# Patient Record
Sex: MALE | Race: WHITE | Employment: OTHER | URBAN - NONMETROPOLITAN AREA
[De-identification: names, ages, dates, MRNs, and addresses within clinical notes are randomized per-mention and may not be internally consistent; named-entity substitution may affect disease eponyms.]

---

## 2017-03-03 ENCOUNTER — OFFICE VISIT (OUTPATIENT)
Dept: FAMILY MEDICINE CLINIC | Age: 62
End: 2017-03-03
Payer: COMMERCIAL

## 2017-03-03 ENCOUNTER — HOSPITAL ENCOUNTER (OUTPATIENT)
Dept: GENERAL RADIOLOGY | Age: 62
Discharge: HOME | End: 2017-03-03
Payer: COMMERCIAL

## 2017-03-03 VITALS
DIASTOLIC BLOOD PRESSURE: 86 MMHG | RESPIRATION RATE: 16 BRPM | SYSTOLIC BLOOD PRESSURE: 138 MMHG | HEART RATE: 90 BPM | WEIGHT: 266 LBS | OXYGEN SATURATION: 95 % | TEMPERATURE: 98 F

## 2017-03-03 DIAGNOSIS — E11.40 TYPE 2 DIABETES MELLITUS WITH DIABETIC NEUROPATHY, WITHOUT LONG-TERM CURRENT USE OF INSULIN (HCC): Primary | ICD-10-CM

## 2017-03-03 DIAGNOSIS — E11.40 TYPE 2 DIABETES MELLITUS WITH DIABETIC NEUROPATHY, WITHOUT LONG-TERM CURRENT USE OF INSULIN (HCC): ICD-10-CM

## 2017-03-03 DIAGNOSIS — R20.0 NUMBNESS IN FEET: ICD-10-CM

## 2017-03-03 DIAGNOSIS — E11.9 TYPE 2 DIABETES MELLITUS WITHOUT COMPLICATION, WITHOUT LONG-TERM CURRENT USE OF INSULIN (HCC): ICD-10-CM

## 2017-03-03 LAB
ALBUMIN SERPL-MCNC: 4.3 G/DL (ref 3.5–5.2)
ALP BLD-CCNC: 55 U/L (ref 40–130)
ALT SERPL-CCNC: 17 U/L (ref 5–41)
ANION GAP SERPL CALCULATED.3IONS-SCNC: 14 MMOL/L (ref 7–19)
AST SERPL-CCNC: 14 U/L (ref 5–40)
BILIRUB SERPL-MCNC: 0.5 MG/DL (ref 0.2–1.2)
BUN BLDV-MCNC: 22 MG/DL (ref 8–23)
CALCIUM SERPL-MCNC: 9.4 MG/DL (ref 8.8–10.2)
CHLORIDE BLD-SCNC: 99 MMOL/L (ref 98–111)
CHOLESTEROL, TOTAL: 190 MG/DL (ref 160–199)
CO2: 27 MMOL/L (ref 22–29)
CREAT SERPL-MCNC: 0.8 MG/DL (ref 0.5–1.2)
GFR NON-AFRICAN AMERICAN: >60
GLOBULIN: 3.3 G/DL
GLUCOSE BLD-MCNC: 99 MG/DL (ref 74–109)
HBA1C MFR BLD: 6.6 %
HDLC SERPL-MCNC: 37 MG/DL (ref 55–121)
LDL CHOLESTEROL CALCULATED: 121 MG/DL
POTASSIUM SERPL-SCNC: 3.9 MMOL/L (ref 3.5–5)
SODIUM BLD-SCNC: 140 MMOL/L (ref 136–145)
TOTAL PROTEIN: 7.6 G/DL (ref 6.6–8.7)
TRIGL SERPL-MCNC: 158 MG/DL (ref 150–199)

## 2017-03-03 PROCEDURE — 99213 OFFICE O/P EST LOW 20 MIN: CPT | Performed by: NURSE PRACTITIONER

## 2017-03-03 PROCEDURE — 72100 X-RAY EXAM L-S SPINE 2/3 VWS: CPT

## 2017-03-03 RX ORDER — LISINOPRIL 5 MG/1
5 TABLET ORAL DAILY
Qty: 30 TABLET | Refills: 5 | Status: SHIPPED | OUTPATIENT
Start: 2017-03-03 | End: 2017-09-28 | Stop reason: SDUPTHER

## 2017-03-03 RX ORDER — GLIPIZIDE 5 MG/1
5 TABLET ORAL
Qty: 60 TABLET | Refills: 5 | Status: SHIPPED | OUTPATIENT
Start: 2017-03-03 | End: 2017-09-28 | Stop reason: SDUPTHER

## 2017-03-03 RX ORDER — GABAPENTIN 100 MG/1
100 CAPSULE ORAL 3 TIMES DAILY
Qty: 90 CAPSULE | Refills: 2 | Status: SHIPPED | OUTPATIENT
Start: 2017-03-03 | End: 2017-03-16 | Stop reason: CLARIF

## 2017-03-06 DIAGNOSIS — R20.2 PARESTHESIA OF BOTH FEET: Primary | ICD-10-CM

## 2017-03-07 RX ORDER — ATORVASTATIN CALCIUM 10 MG/1
10 TABLET, FILM COATED ORAL NIGHTLY
Qty: 30 TABLET | Refills: 5 | Status: SHIPPED | OUTPATIENT
Start: 2017-03-07 | End: 2017-09-28

## 2017-03-07 ASSESSMENT — ENCOUNTER SYMPTOMS: BACK PAIN: 0

## 2017-03-16 DIAGNOSIS — R20.0 NUMBNESS IN FEET: ICD-10-CM

## 2017-03-16 RX ORDER — GABAPENTIN 300 MG/1
300 CAPSULE ORAL 3 TIMES DAILY
Qty: 90 CAPSULE | Refills: 2 | Status: SHIPPED | OUTPATIENT
Start: 2017-03-16 | End: 2017-06-22 | Stop reason: SDUPTHER

## 2017-06-23 RX ORDER — GABAPENTIN 300 MG/1
CAPSULE ORAL
Qty: 90 CAPSULE | Refills: 0 | Status: SHIPPED | OUTPATIENT
Start: 2017-06-23 | End: 2017-08-15 | Stop reason: SDUPTHER

## 2017-07-25 RX ORDER — GABAPENTIN 300 MG/1
CAPSULE ORAL
Qty: 90 CAPSULE | Refills: 0 | OUTPATIENT
Start: 2017-07-25

## 2017-08-15 RX ORDER — GABAPENTIN 300 MG/1
CAPSULE ORAL
Qty: 90 CAPSULE | Refills: 1 | Status: SHIPPED | OUTPATIENT
Start: 2017-08-15 | End: 2017-09-28 | Stop reason: SDUPTHER

## 2017-09-28 ENCOUNTER — OFFICE VISIT (OUTPATIENT)
Dept: FAMILY MEDICINE CLINIC | Age: 62
End: 2017-09-28
Payer: COMMERCIAL

## 2017-09-28 VITALS
DIASTOLIC BLOOD PRESSURE: 94 MMHG | HEART RATE: 86 BPM | OXYGEN SATURATION: 93 % | SYSTOLIC BLOOD PRESSURE: 132 MMHG | WEIGHT: 284.8 LBS | TEMPERATURE: 98.4 F | RESPIRATION RATE: 16 BRPM

## 2017-09-28 DIAGNOSIS — E11.40 TYPE 2 DIABETES MELLITUS WITH DIABETIC NEUROPATHY, WITHOUT LONG-TERM CURRENT USE OF INSULIN (HCC): Primary | ICD-10-CM

## 2017-09-28 DIAGNOSIS — E11.9 TYPE 2 DIABETES MELLITUS WITHOUT COMPLICATION, WITHOUT LONG-TERM CURRENT USE OF INSULIN (HCC): ICD-10-CM

## 2017-09-28 PROCEDURE — 99213 OFFICE O/P EST LOW 20 MIN: CPT | Performed by: NURSE PRACTITIONER

## 2017-09-28 RX ORDER — LISINOPRIL 5 MG/1
5 TABLET ORAL DAILY
Qty: 30 TABLET | Refills: 5 | Status: SHIPPED | OUTPATIENT
Start: 2017-09-28 | End: 2018-03-22 | Stop reason: SDUPTHER

## 2017-09-28 RX ORDER — GABAPENTIN 300 MG/1
CAPSULE ORAL
Qty: 90 CAPSULE | Refills: 1 | Status: SHIPPED | OUTPATIENT
Start: 2017-09-28 | End: 2017-11-14 | Stop reason: SDUPTHER

## 2017-09-28 RX ORDER — GLIPIZIDE 5 MG/1
5 TABLET ORAL
Qty: 60 TABLET | Refills: 5 | Status: SHIPPED | OUTPATIENT
Start: 2017-09-28 | End: 2018-04-20 | Stop reason: SDUPTHER

## 2017-09-28 ASSESSMENT — ENCOUNTER SYMPTOMS
VISUAL CHANGE: 0
BLURRED VISION: 0

## 2017-11-14 ENCOUNTER — TELEPHONE (OUTPATIENT)
Dept: FAMILY MEDICINE CLINIC | Age: 62
End: 2017-11-14

## 2017-11-14 DIAGNOSIS — E11.40 TYPE 2 DIABETES MELLITUS WITH DIABETIC NEUROPATHY, WITHOUT LONG-TERM CURRENT USE OF INSULIN (HCC): ICD-10-CM

## 2017-11-14 RX ORDER — GABAPENTIN 300 MG/1
CAPSULE ORAL
Qty: 90 CAPSULE | Refills: 5 | OUTPATIENT
Start: 2017-11-14 | End: 2018-04-20 | Stop reason: SDUPTHER

## 2017-11-14 RX ORDER — GABAPENTIN 300 MG/1
CAPSULE ORAL
Qty: 90 CAPSULE | Refills: 1 | Status: SHIPPED | OUTPATIENT
Start: 2017-11-14 | End: 2017-11-14 | Stop reason: SDUPTHER

## 2018-03-03 DIAGNOSIS — E11.9 TYPE 2 DIABETES MELLITUS WITHOUT COMPLICATION, WITHOUT LONG-TERM CURRENT USE OF INSULIN (HCC): ICD-10-CM

## 2018-03-05 RX ORDER — LISINOPRIL 5 MG/1
TABLET ORAL
Qty: 30 TABLET | Refills: 5 | OUTPATIENT
Start: 2018-03-05

## 2018-03-22 DIAGNOSIS — E11.9 TYPE 2 DIABETES MELLITUS WITHOUT COMPLICATION, WITHOUT LONG-TERM CURRENT USE OF INSULIN (HCC): ICD-10-CM

## 2018-03-22 RX ORDER — LISINOPRIL 5 MG/1
5 TABLET ORAL DAILY
Qty: 30 TABLET | Refills: 0 | Status: SHIPPED | OUTPATIENT
Start: 2018-03-22 | End: 2018-04-20 | Stop reason: SDUPTHER

## 2018-04-20 ENCOUNTER — OFFICE VISIT (OUTPATIENT)
Dept: FAMILY MEDICINE CLINIC | Age: 63
End: 2018-04-20
Payer: COMMERCIAL

## 2018-04-20 VITALS
TEMPERATURE: 97.5 F | SYSTOLIC BLOOD PRESSURE: 134 MMHG | HEART RATE: 91 BPM | DIASTOLIC BLOOD PRESSURE: 84 MMHG | RESPIRATION RATE: 16 BRPM | OXYGEN SATURATION: 95 % | WEIGHT: 294 LBS

## 2018-04-20 DIAGNOSIS — E11.9 TYPE 2 DIABETES MELLITUS WITHOUT COMPLICATION, WITHOUT LONG-TERM CURRENT USE OF INSULIN (HCC): ICD-10-CM

## 2018-04-20 DIAGNOSIS — E11.40 TYPE 2 DIABETES MELLITUS WITH DIABETIC NEUROPATHY, WITHOUT LONG-TERM CURRENT USE OF INSULIN (HCC): ICD-10-CM

## 2018-04-20 PROCEDURE — 99214 OFFICE O/P EST MOD 30 MIN: CPT | Performed by: NURSE PRACTITIONER

## 2018-04-20 RX ORDER — IBUPROFEN 800 MG/1
800 TABLET ORAL EVERY 8 HOURS PRN
Qty: 120 TABLET | Refills: 3 | Status: SHIPPED | OUTPATIENT
Start: 2018-04-20 | End: 2019-06-14 | Stop reason: SDUPTHER

## 2018-04-20 RX ORDER — GABAPENTIN 300 MG/1
CAPSULE ORAL
Qty: 90 CAPSULE | Refills: 2 | Status: SHIPPED | OUTPATIENT
Start: 2018-04-20 | End: 2018-09-24 | Stop reason: SDUPTHER

## 2018-04-20 RX ORDER — LISINOPRIL 5 MG/1
5 TABLET ORAL DAILY
Qty: 30 TABLET | Refills: 0 | Status: SHIPPED | OUTPATIENT
Start: 2018-04-20 | End: 2018-04-20 | Stop reason: ALTCHOICE

## 2018-04-20 RX ORDER — LISINOPRIL AND HYDROCHLOROTHIAZIDE 12.5; 1 MG/1; MG/1
1 TABLET ORAL DAILY
Qty: 30 TABLET | Refills: 5 | Status: SHIPPED | OUTPATIENT
Start: 2018-04-20 | End: 2018-05-22 | Stop reason: DRUGHIGH

## 2018-04-20 RX ORDER — GLIPIZIDE 5 MG/1
5 TABLET ORAL
Qty: 60 TABLET | Refills: 5 | Status: SHIPPED | OUTPATIENT
Start: 2018-04-20 | End: 2018-08-17 | Stop reason: DRUGHIGH

## 2018-04-20 ASSESSMENT — PATIENT HEALTH QUESTIONNAIRE - PHQ9
SUM OF ALL RESPONSES TO PHQ9 QUESTIONS 1 & 2: 0
2. FEELING DOWN, DEPRESSED OR HOPELESS: 0
SUM OF ALL RESPONSES TO PHQ QUESTIONS 1-9: 0
1. LITTLE INTEREST OR PLEASURE IN DOING THINGS: 0

## 2018-05-12 LAB
AVERAGE GLUCOSE: NORMAL
HBA1C MFR BLD: 7.7 %

## 2018-05-15 LAB
AVERAGE GLUCOSE: NORMAL
HBA1C MFR BLD: 7.7 %

## 2018-05-22 ENCOUNTER — OFFICE VISIT (OUTPATIENT)
Dept: FAMILY MEDICINE CLINIC | Age: 63
End: 2018-05-22
Payer: COMMERCIAL

## 2018-05-22 ENCOUNTER — TELEPHONE (OUTPATIENT)
Dept: FAMILY MEDICINE CLINIC | Age: 63
End: 2018-05-22

## 2018-05-22 VITALS
OXYGEN SATURATION: 98 % | DIASTOLIC BLOOD PRESSURE: 64 MMHG | TEMPERATURE: 98.4 F | WEIGHT: 278 LBS | SYSTOLIC BLOOD PRESSURE: 114 MMHG | RESPIRATION RATE: 16 BRPM | HEART RATE: 102 BPM

## 2018-05-22 DIAGNOSIS — N28.89 MASS OF RIGHT KIDNEY: Primary | ICD-10-CM

## 2018-05-22 DIAGNOSIS — N28.89 RENAL MASS, RIGHT: ICD-10-CM

## 2018-05-22 DIAGNOSIS — J18.9 PNEUMONIA OF BOTH LOWER LOBES DUE TO INFECTIOUS ORGANISM: Primary | ICD-10-CM

## 2018-05-22 DIAGNOSIS — R09.02 HYPOXIA: ICD-10-CM

## 2018-05-22 PROCEDURE — 99496 TRANSJ CARE MGMT HIGH F2F 7D: CPT | Performed by: NURSE PRACTITIONER

## 2018-05-22 RX ORDER — DOXYCYCLINE HYCLATE 100 MG/1
100 CAPSULE ORAL 2 TIMES DAILY
COMMUNITY
End: 2018-08-17

## 2018-05-22 RX ORDER — LISINOPRIL AND HYDROCHLOROTHIAZIDE 25; 20 MG/1; MG/1
1 TABLET ORAL DAILY
COMMUNITY
End: 2018-12-07 | Stop reason: SDUPTHER

## 2018-05-22 RX ORDER — IPRATROPIUM BROMIDE AND ALBUTEROL SULFATE 2.5; .5 MG/3ML; MG/3ML
1 SOLUTION RESPIRATORY (INHALATION) EVERY 4 HOURS PRN
COMMUNITY
End: 2022-01-18 | Stop reason: SDUPTHER

## 2018-05-22 ASSESSMENT — PATIENT HEALTH QUESTIONNAIRE - PHQ9
SUM OF ALL RESPONSES TO PHQ QUESTIONS 1-9: 0
SUM OF ALL RESPONSES TO PHQ9 QUESTIONS 1 & 2: 0
1. LITTLE INTEREST OR PLEASURE IN DOING THINGS: 0
2. FEELING DOWN, DEPRESSED OR HOPELESS: 0

## 2018-05-22 ASSESSMENT — ENCOUNTER SYMPTOMS
COUGH: 0
ABDOMINAL DISTENTION: 0
SHORTNESS OF BREATH: 0

## 2018-05-23 ENCOUNTER — TELEPHONE (OUTPATIENT)
Dept: FAMILY MEDICINE CLINIC | Age: 63
End: 2018-05-23

## 2018-05-23 DIAGNOSIS — E11.9 TYPE 2 DIABETES MELLITUS WITHOUT COMPLICATION, WITHOUT LONG-TERM CURRENT USE OF INSULIN (HCC): ICD-10-CM

## 2018-05-23 DIAGNOSIS — R09.02 HYPOXIA: ICD-10-CM

## 2018-05-23 LAB
ALBUMIN SERPL-MCNC: 3.6 G/DL (ref 3.5–5.2)
ALP BLD-CCNC: 119 U/L (ref 40–130)
ALT SERPL-CCNC: 56 U/L (ref 5–41)
ANION GAP SERPL CALCULATED.3IONS-SCNC: 15 MMOL/L (ref 7–19)
AST SERPL-CCNC: 24 U/L (ref 5–40)
BILIRUB SERPL-MCNC: 0.5 MG/DL (ref 0.2–1.2)
BILIRUBIN URINE: NEGATIVE
BLOOD, URINE: NEGATIVE
BUN BLDV-MCNC: 19 MG/DL (ref 8–23)
CALCIUM SERPL-MCNC: 9.2 MG/DL (ref 8.8–10.2)
CHLORIDE BLD-SCNC: 95 MMOL/L (ref 98–111)
CLARITY: CLEAR
CO2: 30 MMOL/L (ref 22–29)
COLOR: ABNORMAL
CREAT SERPL-MCNC: 0.8 MG/DL (ref 0.5–1.2)
CREATININE URINE: 211.9 MG/DL (ref 4.2–622)
GFR NON-AFRICAN AMERICAN: >60
GLUCOSE BLD-MCNC: 129 MG/DL (ref 74–109)
GLUCOSE URINE: NEGATIVE MG/DL
HCT VFR BLD CALC: 46.6 % (ref 42–52)
HEMOGLOBIN: 14.4 G/DL (ref 14–18)
KETONES, URINE: NEGATIVE MG/DL
LEUKOCYTE ESTERASE, URINE: NEGATIVE
MCH RBC QN AUTO: 27.1 PG (ref 27–31)
MCHC RBC AUTO-ENTMCNC: 30.9 G/DL (ref 33–37)
MCV RBC AUTO: 87.8 FL (ref 80–94)
MICROALBUMIN UR-MCNC: 3.6 MG/DL (ref 0–19)
MICROALBUMIN/CREAT UR-RTO: 17 MG/G
NITRITE, URINE: NEGATIVE
PDW BLD-RTO: 13.9 % (ref 11.5–14.5)
PH UA: 7.5
PLATELET # BLD: 349 K/UL (ref 130–400)
PMV BLD AUTO: 9.5 FL (ref 9.4–12.4)
POTASSIUM SERPL-SCNC: 4 MMOL/L (ref 3.5–5)
PROTEIN UA: ABNORMAL MG/DL
RBC # BLD: 5.31 M/UL (ref 4.7–6.1)
SODIUM BLD-SCNC: 140 MMOL/L (ref 136–145)
SPECIFIC GRAVITY UA: 1.01
TOTAL PROTEIN: 7.7 G/DL (ref 6.6–8.7)
UROBILINOGEN, URINE: 0.2 E.U./DL
WBC # BLD: 9.8 K/UL (ref 4.8–10.8)

## 2018-06-06 ENCOUNTER — TELEPHONE (OUTPATIENT)
Dept: FAMILY MEDICINE CLINIC | Age: 63
End: 2018-06-06

## 2018-08-17 ENCOUNTER — TELEPHONE (OUTPATIENT)
Dept: FAMILY MEDICINE CLINIC | Age: 63
End: 2018-08-17

## 2018-08-17 ENCOUNTER — OFFICE VISIT (OUTPATIENT)
Dept: FAMILY MEDICINE CLINIC | Age: 63
End: 2018-08-17
Payer: COMMERCIAL

## 2018-08-17 VITALS
OXYGEN SATURATION: 96 % | DIASTOLIC BLOOD PRESSURE: 80 MMHG | TEMPERATURE: 98.1 F | HEART RATE: 106 BPM | RESPIRATION RATE: 16 BRPM | SYSTOLIC BLOOD PRESSURE: 152 MMHG | WEIGHT: 293 LBS

## 2018-08-17 DIAGNOSIS — E11.65 TYPE 2 DIABETES MELLITUS WITH HYPERGLYCEMIA, WITHOUT LONG-TERM CURRENT USE OF INSULIN (HCC): ICD-10-CM

## 2018-08-17 DIAGNOSIS — E11.65 TYPE 2 DIABETES MELLITUS WITH HYPERGLYCEMIA, WITHOUT LONG-TERM CURRENT USE OF INSULIN (HCC): Primary | ICD-10-CM

## 2018-08-17 DIAGNOSIS — I10 BENIGN ESSENTIAL HTN: ICD-10-CM

## 2018-08-17 LAB
ALBUMIN SERPL-MCNC: 4.1 G/DL (ref 3.5–5.2)
ALP BLD-CCNC: 90 U/L (ref 40–130)
ALT SERPL-CCNC: 25 U/L (ref 5–41)
ANION GAP SERPL CALCULATED.3IONS-SCNC: 18 MMOL/L (ref 7–19)
AST SERPL-CCNC: 15 U/L (ref 5–40)
BASOPHILS ABSOLUTE: 0.1 K/UL (ref 0–0.2)
BASOPHILS RELATIVE PERCENT: 0.8 % (ref 0–1)
BILIRUB SERPL-MCNC: 0.3 MG/DL (ref 0.2–1.2)
BUN BLDV-MCNC: 29 MG/DL (ref 8–23)
CALCIUM SERPL-MCNC: 9.3 MG/DL (ref 8.8–10.2)
CHLORIDE BLD-SCNC: 95 MMOL/L (ref 98–111)
CO2: 26 MMOL/L (ref 22–29)
CREAT SERPL-MCNC: 1.3 MG/DL (ref 0.5–1.2)
EOSINOPHILS ABSOLUTE: 0.2 K/UL (ref 0–0.6)
EOSINOPHILS RELATIVE PERCENT: 2.3 % (ref 0–5)
GFR NON-AFRICAN AMERICAN: 55
GLUCOSE BLD-MCNC: 371 MG/DL (ref 74–109)
HBA1C MFR BLD: 9.4 % (ref 4–6)
HCT VFR BLD CALC: 44.3 % (ref 42–52)
HEMOGLOBIN: 13.9 G/DL (ref 14–18)
LYMPHOCYTES ABSOLUTE: 1.7 K/UL (ref 1.1–4.5)
LYMPHOCYTES RELATIVE PERCENT: 16.2 % (ref 20–40)
MCH RBC QN AUTO: 28 PG (ref 27–31)
MCHC RBC AUTO-ENTMCNC: 31.4 G/DL (ref 33–37)
MCV RBC AUTO: 89.3 FL (ref 80–94)
MONOCYTES ABSOLUTE: 0.6 K/UL (ref 0–0.9)
MONOCYTES RELATIVE PERCENT: 6.3 % (ref 0–10)
NEUTROPHILS ABSOLUTE: 7.5 K/UL (ref 1.5–7.5)
NEUTROPHILS RELATIVE PERCENT: 73.3 % (ref 50–65)
PDW BLD-RTO: 14.6 % (ref 11.5–14.5)
PLATELET # BLD: 248 K/UL (ref 130–400)
PMV BLD AUTO: 9.2 FL (ref 9.4–12.4)
POTASSIUM SERPL-SCNC: 4.4 MMOL/L (ref 3.5–5)
RBC # BLD: 4.96 M/UL (ref 4.7–6.1)
SODIUM BLD-SCNC: 139 MMOL/L (ref 136–145)
TOTAL PROTEIN: 7.4 G/DL (ref 6.6–8.7)
WBC # BLD: 10.2 K/UL (ref 4.8–10.8)

## 2018-08-17 PROCEDURE — 99214 OFFICE O/P EST MOD 30 MIN: CPT | Performed by: NURSE PRACTITIONER

## 2018-08-17 RX ORDER — AMLODIPINE BESYLATE 5 MG/1
5 TABLET ORAL NIGHTLY
Qty: 90 TABLET | Refills: 1 | Status: SHIPPED | OUTPATIENT
Start: 2018-08-17 | End: 2018-12-07 | Stop reason: SDUPTHER

## 2018-08-17 RX ORDER — AMLODIPINE BESYLATE 5 MG/1
5 TABLET ORAL NIGHTLY
Qty: 30 TABLET | Refills: 5 | Status: SHIPPED | OUTPATIENT
Start: 2018-08-17 | End: 2018-08-17 | Stop reason: SDUPTHER

## 2018-08-17 RX ORDER — CEFDINIR 300 MG/1
1 CAPSULE ORAL 2 TIMES DAILY
COMMUNITY
Start: 2018-08-11 | End: 2018-08-17 | Stop reason: ALTCHOICE

## 2018-08-17 RX ORDER — GLIPIZIDE 10 MG/1
10 TABLET ORAL 2 TIMES DAILY
Qty: 60 TABLET | Refills: 5 | Status: SHIPPED | OUTPATIENT
Start: 2018-08-17 | End: 2019-02-07 | Stop reason: SDUPTHER

## 2018-08-17 RX ORDER — LEVOFLOXACIN 750 MG/1
1 TABLET ORAL DAILY
COMMUNITY
Start: 2018-08-11 | End: 2018-12-07 | Stop reason: ALTCHOICE

## 2018-08-17 NOTE — TELEPHONE ENCOUNTER
I have sent in invokana 100 daily. Tell him to wait a week until he gets the 10 of glucotrol bid going and see what numbers do. If still elevated, add invokana, if not expensive. If too expensive, call insurance and get us preferred formulary list for diabetic meds.   Thanks, lee

## 2018-08-17 NOTE — PATIENT INSTRUCTIONS
You will get more details on the specific medicines your doctor prescribes. · Check your blood sugar as often as your doctor recommends. It is important to keep track of any symptoms you have, such as low blood sugar. Also tell your doctor if you have any changes in your activities, diet, or insulin use. · Talk to your doctor before you start taking aspirin every day. Aspirin can help certain people lower their risk of a heart attack or stroke. But taking aspirin isn't right for everyone, because it can cause serious bleeding. · Do not smoke. If you need help quitting, talk to your doctor about stop-smoking programs and medicines. These can increase your chances of quitting for good. · Keep your cholesterol and blood pressure at normal levels. You may need to take one or more medicines to reach your goals. Take them exactly as directed. Do not stop or change a medicine without talking to your doctor first.  When should you call for help? Call 911 anytime you think you may need emergency care. For example, call if:    · You passed out (lost consciousness), or you suddenly become very sleepy or confused. (You may have very low blood sugar.)    Call your doctor now or seek immediate medical care if:    · Your blood sugar is 300 mg/dL or is higher than the level your doctor has set for you.     · You have symptoms of low blood sugar, such as:  ¨ Sweating. ¨ Feeling nervous, shaky, and weak. ¨ Extreme hunger and slight nausea. ¨ Dizziness and headache. ¨ Blurred vision. ¨ Confusion.    Watch closely for changes in your health, and be sure to contact your doctor if:    · You often have problems controlling your blood sugar.     · You have symptoms of long-term diabetes problems, such as:  ¨ New vision changes. ¨ New pain, numbness, or tingling in your hands or feet. ¨ Skin problems. Where can you learn more? Go to https://jessee.Redfin Network. org and sign in to your Linea account.  Enter C553 in the Search Health Information box to learn more about \"Type 2 Diabetes: Care Instructions. \"     If you do not have an account, please click on the \"Sign Up Now\" link. Current as of: December 7, 2017  Content Version: 11.7  © 0485-7199 [employer]. Care instructions adapted under license by [facility] ([facility]). If you have questions about a medical condition or this instruction, always ask your healthcare professional. [address] any warranty or liability for your use of this information. Patient Education        Low Sodium Diet (2,000 Milligram): Care Instructions  Your Care Instructions    Too much sodium causes your body to hold on to extra water. This can raise your blood pressure and force your heart and kidneys to work harder. In very serious cases, this could cause you to be put in the hospital. It might even be life-threatening. By limiting sodium, you will feel better and lower your risk of serious problems. The most common source of sodium is salt. People get most of the salt in their diet from canned, prepared, and packaged foods. Fast food and restaurant meals also are very high in sodium. Your doctor will probably limit your sodium to less than 2,000 milligrams (mg) a day. This limit counts all the sodium in prepared and packaged foods and any salt you add to your food. Follow-up care is a key part of your treatment and safety. Be sure to make and go to all appointments, and call your doctor if you are having problems. It's also a good idea to know your test results and keep a list of the medicines you take. How can you care for yourself at home? Read food labels  · Read labels on cans and food packages. The labels tell you how much sodium is in each serving. Make sure that you look at the serving size. If you eat more than the serving size, you have eaten more sodium. · Food labels also tell you the Percent Daily Value for sodium.  Choose products with low Percent eating:  ¨ Smoked, cured, salted, and canned meat, fish, and poultry. ¨ Ham, winslow, hot dogs, and luncheon meats. ¨ Regular, hard, and processed cheese and regular peanut butter. ¨ Crackers with salted tops, and other salted snack foods such as pretzels, chips, and salted popcorn. ¨ Frozen prepared meals, unless labeled low-sodium. ¨ Canned and dried soups, broths, and bouillon, unless labeled sodium-free or low-sodium. ¨ Canned vegetables, unless labeled sodium-free or low-sodium. ¨ Western [employer] fries, pizza, tacos, and other fast foods. ¨ Pickles, olives, ketchup, and other condiments, especially soy sauce, unless labeled sodium-free or low-sodium. Where can you learn more? Go to https://Parametric Diningpepiceweb.Sensipass. org and sign in to your [employer] account. Enter H428 in the Tsavo [employer] box to learn more about \"Low Sodium Diet (2,000 Milligram): Care Instructions. \"     If you do not have an account, please click on the \"Sign Up Now\" link. Current as of: May 12, 2017  Content Version: 11.7  © 1026-3933 [employer]. Care instructions adapted under license by [facility] ([facility]). If you have questions about a medical condition or this instruction, always ask your healthcare professional. [place] [address] any warranty or liability for your use of this information.

## 2018-08-17 NOTE — TELEPHONE ENCOUNTER
We will try another med, in the meantime, increase glucotrol to 10 bid, instead of 5. We will submit another med to pharmacy to see if we can get approval from insurance. If not, we will need him to contact pharmacy to send us a preferred formulary list for diabetic medications.   lee

## 2018-08-17 NOTE — TELEPHONE ENCOUNTER
----- Message from JEVON Lomeli sent at 8/17/2018  3:09 PM CDT -----  White count was normal, but at the top of normal, consistent with respiratory infection. Continue treatment with levaquin, but stop omnicef. I think it is affecting renal function. His a1c is up to 9.4, but he has had two respiratory infections and one hospitalization in the past two months. He is making appt to see Dr. Chino Conley pulmonology in [facility]. I still have MRI of abdomen ordered to evaluate that renal mass that was noted while he was in hospital that radiologist wanted us to follow up on.   Monitor glucose and bp and follow up in one month, sooner if worse or if numbers do not improve.  lee

## 2018-08-17 NOTE — PROGRESS NOTES
Provider, MD   SITagliptin (JANUVIA) 100 MG tablet Take 1 tablet by mouth daily Yes JEVON Robertson   amLODIPine (NORVASC) 5 MG tablet Take 1 tablet by mouth nightly Yes JEVON Robertson   lisinopril-hydrochlorothiazide (PRINZIDE;ZESTORETIC) 20-25 MG per tablet Take 1 tablet by mouth daily Yes Historical Provider, MD   ipratropium-albuterol (DUONEB) 0.5-2.5 (3) MG/3ML SOLN nebulizer solution Inhale 1 vial into the lungs every 4 hours Yes Historical Provider, MD   gabapentin (NEURONTIN) 300 MG capsule TAKE ONE CAPSULE BY MOUTH THREE TIMES DAILY. Yes JEVON Robertson   glipiZIDE (GLUCOTROL) 5 MG tablet Take 1 tablet by mouth 2 times daily (before meals) Yes JEVON Robertson   ibuprofen (ADVIL;MOTRIN) 800 MG tablet Take 1 tablet by mouth every 8 hours as needed for Pain Yes JEVON Robertson   glucose monitoring kit (FREESTYLE) monitoring kit DX: Diabetes  Dispense store brand or whatever brand insurance will cover. Glucometer, lancets and testing strips for tid testing 30 days Yes JEVON Robertson        Social History   Substance Use Topics    Smoking status: Never Smoker    Smokeless tobacco: Never Used    Alcohol use Not on file        Vitals:    08/17/18 0814 08/17/18 0821   BP: (!) 152/78 (!) 152/80   Site: Right Arm Right Arm   Position: Sitting Sitting   Cuff Size: Large Adult Large Adult   Pulse: 106    Resp: 16    Temp: 98.1 °F (36.7 °C)    TempSrc: Oral    SpO2: 96%    Weight: 293 lb (132.9 kg)      There is no height or weight on file to calculate BMI. Physical Exam   Constitutional: He is oriented to person, place, and time. He appears well-developed and well-nourished. No distress. HENT:   Head: Normocephalic. Right Ear: External ear normal.   Left Ear: External ear normal.   Nose: Nose normal.   Mouth/Throat: Oropharynx is clear and moist. No oropharyngeal exudate. Eyes: Pupils are equal, round, and reactive to light.  Conjunctivae are normal.   Neck: Normal range of

## 2018-09-11 ENCOUNTER — PATIENT MESSAGE (OUTPATIENT)
Dept: FAMILY MEDICINE CLINIC | Age: 63
End: 2018-09-11

## 2018-09-13 ENCOUNTER — TELEPHONE (OUTPATIENT)
Dept: FAMILY MEDICINE CLINIC | Age: 63
End: 2018-09-13

## 2018-09-13 NOTE — TELEPHONE ENCOUNTER
Called pt and let him know that Meme Hanks had sent in prescription for invokana instead of januvia and that we have a discount card he could use for invokana. Also left samples of invokana to try instead of januvia at   and a prescription card for good rx to try. Pt states FBS has been about 98 on glucotrol 10 bid and Januvia.  Pt to call with any problems (this was in response to email pt had sent)

## 2018-09-24 DIAGNOSIS — E11.40 TYPE 2 DIABETES MELLITUS WITH DIABETIC NEUROPATHY, WITHOUT LONG-TERM CURRENT USE OF INSULIN (HCC): ICD-10-CM

## 2018-09-24 RX ORDER — GABAPENTIN 300 MG/1
CAPSULE ORAL
Qty: 90 CAPSULE | Refills: 2 | Status: SHIPPED | OUTPATIENT
Start: 2018-09-24 | End: 2018-12-07

## 2018-12-07 ENCOUNTER — OFFICE VISIT (OUTPATIENT)
Dept: FAMILY MEDICINE CLINIC | Age: 63
End: 2018-12-07
Payer: COMMERCIAL

## 2018-12-07 VITALS
HEART RATE: 94 BPM | WEIGHT: 296 LBS | SYSTOLIC BLOOD PRESSURE: 135 MMHG | TEMPERATURE: 98 F | OXYGEN SATURATION: 97 % | DIASTOLIC BLOOD PRESSURE: 70 MMHG

## 2018-12-07 DIAGNOSIS — E11.40 TYPE 2 DIABETES MELLITUS WITH DIABETIC NEUROPATHY, WITHOUT LONG-TERM CURRENT USE OF INSULIN (HCC): ICD-10-CM

## 2018-12-07 DIAGNOSIS — Z23 NEED FOR PNEUMOCOCCAL VACCINE: ICD-10-CM

## 2018-12-07 DIAGNOSIS — G62.9 NEUROPATHY: ICD-10-CM

## 2018-12-07 DIAGNOSIS — I10 BENIGN ESSENTIAL HTN: Primary | ICD-10-CM

## 2018-12-07 DIAGNOSIS — G25.81 RESTLESS LEG SYNDROME: ICD-10-CM

## 2018-12-07 PROCEDURE — 90471 IMMUNIZATION ADMIN: CPT | Performed by: NURSE PRACTITIONER

## 2018-12-07 PROCEDURE — 99213 OFFICE O/P EST LOW 20 MIN: CPT | Performed by: NURSE PRACTITIONER

## 2018-12-07 PROCEDURE — 90732 PPSV23 VACC 2 YRS+ SUBQ/IM: CPT | Performed by: NURSE PRACTITIONER

## 2018-12-07 RX ORDER — AMLODIPINE BESYLATE 5 MG/1
5 TABLET ORAL NIGHTLY
Qty: 90 TABLET | Refills: 1 | Status: SHIPPED | OUTPATIENT
Start: 2018-12-07 | End: 2019-06-14 | Stop reason: SDUPTHER

## 2018-12-07 RX ORDER — GABAPENTIN 600 MG/1
600 TABLET ORAL 2 TIMES DAILY
Qty: 180 TABLET | Refills: 1 | Status: SHIPPED | OUTPATIENT
Start: 2018-12-07 | End: 2019-06-14 | Stop reason: SDUPTHER

## 2018-12-07 RX ORDER — LEVOFLOXACIN 750 MG/1
750 TABLET ORAL DAILY
Status: CANCELLED | OUTPATIENT
Start: 2018-12-07

## 2018-12-07 RX ORDER — GLIPIZIDE 10 MG/1
10 TABLET ORAL 2 TIMES DAILY
Qty: 60 TABLET | Refills: 5 | Status: CANCELLED | OUTPATIENT
Start: 2018-12-07

## 2018-12-07 RX ORDER — LISINOPRIL AND HYDROCHLOROTHIAZIDE 25; 20 MG/1; MG/1
1 TABLET ORAL DAILY
Qty: 90 TABLET | Refills: 1 | Status: SHIPPED | OUTPATIENT
Start: 2018-12-07 | End: 2019-06-14 | Stop reason: SDUPTHER

## 2018-12-07 RX ORDER — GABAPENTIN 400 MG/1
CAPSULE ORAL
Qty: 90 CAPSULE | Refills: 2 | Status: CANCELLED | OUTPATIENT
Start: 2018-12-07 | End: 2019-04-05

## 2018-12-07 ASSESSMENT — ENCOUNTER SYMPTOMS: SHORTNESS OF BREATH: 0

## 2018-12-07 NOTE — PROGRESS NOTES
Dispense store brand or whatever brand insurance will cover. Glucometer, lancets and testing strips for tid testing 30 days 1 kit 0     No current facility-administered medications for this visit. Allergies   Allergen Reactions    Aspirin     Penicillins        Health Maintenance   Topic Date Due    Hepatitis C screen  1955    HIV screen  01/11/1969    DTaP/Tdap/Td vaccine (1 - Tdap) 01/11/1973    Shingles Vaccine (1 of 2 - 2 Dose Series) 01/11/2004    Colon cancer screen colonoscopy  01/11/2004    Lipid screen  03/03/2018    Flu vaccine (1) 09/01/2018    A1C test (Diabetic or Prediabetic)  11/17/2018    Diabetic retinal exam  04/16/2019    Diabetic foot exam  04/20/2019    Diabetic microalbuminuria test  05/23/2019    Potassium monitoring  08/17/2019    Creatinine monitoring  08/17/2019    Pneumococcal med risk  Completed       Subjective:      Review of Systems   Constitutional: Negative for unexpected weight change. Respiratory: Negative for shortness of breath. Cardiovascular: Negative for chest pain. Endocrine: Negative for polydipsia and polyuria. Objective:     Physical Exam   Constitutional: He is oriented to person, place, and time. He appears well-developed and well-nourished. No distress. HENT:   Head: Normocephalic. Right Ear: External ear normal.   Left Ear: External ear normal.   Nose: Nose normal.   Mouth/Throat: Oropharynx is clear and moist. No oropharyngeal exudate. Eyes: Pupils are equal, round, and reactive to light. Conjunctivae are normal.   Neck: Normal range of motion. Neck supple. Cardiovascular: Normal rate, regular rhythm and normal heart sounds. Pulmonary/Chest: Effort normal and breath sounds normal. No respiratory distress. Neurological: He is alert and oriented to person, place, and time. Skin: Skin is warm and dry. He is not diaphoretic. Sensory exam of the foot is normal, tested with the monofilament.  Good pulses, no lesions

## 2019-02-08 RX ORDER — GLIPIZIDE 10 MG/1
TABLET ORAL
Qty: 60 TABLET | Refills: 5 | Status: SHIPPED | OUTPATIENT
Start: 2019-02-08 | End: 2019-06-14 | Stop reason: SDUPTHER

## 2019-03-21 DIAGNOSIS — E11.40 TYPE 2 DIABETES MELLITUS WITH DIABETIC NEUROPATHY, WITHOUT LONG-TERM CURRENT USE OF INSULIN (HCC): ICD-10-CM

## 2019-03-21 LAB
ALBUMIN SERPL-MCNC: 4.3 G/DL (ref 3.5–5.2)
ALP BLD-CCNC: 64 U/L (ref 40–130)
ALT SERPL-CCNC: 19 U/L (ref 5–41)
ANION GAP SERPL CALCULATED.3IONS-SCNC: 14 MMOL/L (ref 7–19)
AST SERPL-CCNC: 21 U/L (ref 5–40)
BILIRUB SERPL-MCNC: 0.4 MG/DL (ref 0.2–1.2)
BILIRUBIN URINE: NEGATIVE
BLOOD, URINE: NEGATIVE
BUN BLDV-MCNC: 27 MG/DL (ref 8–23)
CALCIUM SERPL-MCNC: 9.7 MG/DL (ref 8.8–10.2)
CHLORIDE BLD-SCNC: 102 MMOL/L (ref 98–111)
CHOLESTEROL, TOTAL: 194 MG/DL (ref 160–199)
CLARITY: CLEAR
CO2: 27 MMOL/L (ref 22–29)
COLOR: YELLOW
CREAT SERPL-MCNC: 1 MG/DL (ref 0.5–1.2)
GFR NON-AFRICAN AMERICAN: >60
GLUCOSE BLD-MCNC: 83 MG/DL (ref 74–109)
GLUCOSE URINE: >=1000 MG/DL
HBA1C MFR BLD: 8.2 % (ref 4–6)
HCT VFR BLD CALC: 50.3 % (ref 42–52)
HDLC SERPL-MCNC: 39 MG/DL (ref 55–121)
HEMOGLOBIN: 15.5 G/DL (ref 14–18)
KETONES, URINE: ABNORMAL MG/DL
LDL CHOLESTEROL CALCULATED: 130 MG/DL
LEUKOCYTE ESTERASE, URINE: NEGATIVE
MCH RBC QN AUTO: 27.5 PG (ref 27–31)
MCHC RBC AUTO-ENTMCNC: 30.8 G/DL (ref 33–37)
MCV RBC AUTO: 89.3 FL (ref 80–94)
NITRITE, URINE: NEGATIVE
PDW BLD-RTO: 15.8 % (ref 11.5–14.5)
PH UA: 6 (ref 5–8)
PLATELET # BLD: 237 K/UL (ref 130–400)
PMV BLD AUTO: 9.9 FL (ref 9.4–12.4)
POTASSIUM SERPL-SCNC: 4.2 MMOL/L (ref 3.5–5)
PROTEIN UA: NEGATIVE MG/DL
RBC # BLD: 5.63 M/UL (ref 4.7–6.1)
SODIUM BLD-SCNC: 143 MMOL/L (ref 136–145)
SPECIFIC GRAVITY UA: 1.03 (ref 1–1.03)
TOTAL PROTEIN: 7.8 G/DL (ref 6.6–8.7)
TRIGL SERPL-MCNC: 127 MG/DL (ref 0–149)
UROBILINOGEN, URINE: 0.2 E.U./DL
WBC # BLD: 8.7 K/UL (ref 4.8–10.8)

## 2019-03-27 ENCOUNTER — TELEPHONE (OUTPATIENT)
Dept: FAMILY MEDICINE CLINIC | Age: 64
End: 2019-03-27

## 2019-06-14 ENCOUNTER — OFFICE VISIT (OUTPATIENT)
Dept: FAMILY MEDICINE CLINIC | Age: 64
End: 2019-06-14
Payer: COMMERCIAL

## 2019-06-14 VITALS
WEIGHT: 296 LBS | TEMPERATURE: 98.1 F | SYSTOLIC BLOOD PRESSURE: 122 MMHG | DIASTOLIC BLOOD PRESSURE: 62 MMHG | OXYGEN SATURATION: 97 % | RESPIRATION RATE: 16 BRPM | HEART RATE: 96 BPM

## 2019-06-14 DIAGNOSIS — E11.40 TYPE 2 DIABETES MELLITUS WITH DIABETIC NEUROPATHY, WITHOUT LONG-TERM CURRENT USE OF INSULIN (HCC): ICD-10-CM

## 2019-06-14 DIAGNOSIS — I10 BENIGN ESSENTIAL HTN: ICD-10-CM

## 2019-06-14 DIAGNOSIS — G25.81 RESTLESS LEG SYNDROME: ICD-10-CM

## 2019-06-14 DIAGNOSIS — G62.9 NEUROPATHY: ICD-10-CM

## 2019-06-14 DIAGNOSIS — G47.09 OTHER INSOMNIA: Primary | ICD-10-CM

## 2019-06-14 LAB — HBA1C MFR BLD: 8.1 % (ref 4–6)

## 2019-06-14 PROCEDURE — 99213 OFFICE O/P EST LOW 20 MIN: CPT | Performed by: NURSE PRACTITIONER

## 2019-06-14 RX ORDER — IBUPROFEN 800 MG/1
800 TABLET ORAL EVERY 8 HOURS PRN
Qty: 120 TABLET | Refills: 3 | Status: SHIPPED | OUTPATIENT
Start: 2019-06-14 | End: 2019-12-13 | Stop reason: SDUPTHER

## 2019-06-14 RX ORDER — AMLODIPINE BESYLATE 5 MG/1
5 TABLET ORAL NIGHTLY
Qty: 30 TABLET | Refills: 5 | Status: SHIPPED | OUTPATIENT
Start: 2019-06-14 | End: 2019-12-13 | Stop reason: SDUPTHER

## 2019-06-14 RX ORDER — GLIPIZIDE 10 MG/1
TABLET ORAL
Qty: 60 TABLET | Refills: 5 | Status: SHIPPED | OUTPATIENT
Start: 2019-06-14 | End: 2019-12-13 | Stop reason: SDUPTHER

## 2019-06-14 RX ORDER — GABAPENTIN 600 MG/1
600 TABLET ORAL 2 TIMES DAILY
Qty: 180 TABLET | Refills: 1 | Status: SHIPPED | OUTPATIENT
Start: 2019-06-14 | End: 2020-06-13 | Stop reason: SDUPTHER

## 2019-06-14 RX ORDER — PREDNISONE 1 MG/1
TABLET ORAL
Qty: 21 TABLET | Refills: 0 | Status: SHIPPED | OUTPATIENT
Start: 2019-06-14 | End: 2021-03-17

## 2019-06-14 RX ORDER — LISINOPRIL AND HYDROCHLOROTHIAZIDE 25; 20 MG/1; MG/1
1 TABLET ORAL DAILY
Qty: 30 TABLET | Refills: 5 | Status: SHIPPED | OUTPATIENT
Start: 2019-06-14 | End: 2019-12-23

## 2019-06-14 RX ORDER — ZOLPIDEM TARTRATE 5 MG/1
5 TABLET ORAL NIGHTLY PRN
Qty: 14 TABLET | Refills: 0 | Status: SHIPPED | OUTPATIENT
Start: 2019-06-14 | End: 2019-06-28

## 2019-06-14 ASSESSMENT — PATIENT HEALTH QUESTIONNAIRE - PHQ9
SUM OF ALL RESPONSES TO PHQ9 QUESTIONS 1 & 2: 0
SUM OF ALL RESPONSES TO PHQ QUESTIONS 1-9: 0
1. LITTLE INTEREST OR PLEASURE IN DOING THINGS: 0
2. FEELING DOWN, DEPRESSED OR HOPELESS: 0
SUM OF ALL RESPONSES TO PHQ QUESTIONS 1-9: 0

## 2019-06-14 NOTE — PROGRESS NOTES
Radha Torresinald [place] [address]  Dept: [phone]  Dept Fax: [phone]  Loc: [phone]    Jessica Tovar is a 71 y.o. male who presentstoday for his medical conditions/complaints as noted below. Jessica Tovar is c/o of Diabetes (blood sugar averages 147 over past month. need refills)        HPI:     Diabetes   He presents for his follow-up diabetic visit. He has type 2 diabetes mellitus. No MedicAlert identification noted. His disease course has been stable. There are no hypoglycemic associated symptoms. There are no diabetic associated symptoms. There are no hypoglycemic complications. Symptoms are stable. Diabetic complications include peripheral neuropathy. There are no known risk factors for coronary artery disease. Current diabetic treatment includes oral agent (dual therapy). He is compliant with treatment all of the time. His weight is stable. He is following a generally healthy diet. When asked about meal planning, he reported none. He has not had a previous visit with a dietitian. He participates in exercise daily. There is no change in his home blood glucose trend. His overall blood glucose range is 110-130 mg/dl. An ACE inhibitor/angiotensin II receptor blocker is being taken. He does not see a podiatrist.Eye exam is current.        Past Medical History:   Diagnosis Date    HTN (hypertension)     Pneumonia     Type 2 diabetes mellitus ([address] 5/2016      Past Surgical History:   Procedure Laterality Date    ARM SURGERY Left     fracutre arm    KIDNEY STONE SURGERY         Family History   Problem Relation Age of Onset    Cancer Mother     Cancer Father        Social History     Tobacco Use    Smoking status: Never Smoker    Smokeless tobacco: Never Used   Substance Use Topics    Alcohol use: Not on file      Current Outpatient Medications   Medication Sig Dispense Refill    glipiZIDE (GLUCOTROL) 10 MG tablet TAKE 1 TABLET BY MOUTH TWICE DAILY 60 tablet 5    amLODIPine (NORVASC) 5 MG tablet Take 1 tablet by mouth nightly 30 tablet 5    lisinopril-hydrochlorothiazide (PRINZIDE;ZESTORETIC) 20-25 MG per tablet Take 1 tablet by mouth daily 30 tablet 5    gabapentin (NEURONTIN) 600 MG tablet Take 1 tablet by mouth 2 times daily for 182 days. 180 tablet 1    canagliflozin (INVOKANA) 100 MG TABS tablet Take 1 tablet by mouth every morning (before breakfast) 30 tablet 5    ibuprofen (ADVIL;MOTRIN) 800 MG tablet Take 1 tablet by mouth every 8 hours as needed for Pain 120 tablet 3    zolpidem (AMBIEN) 5 MG tablet Take 1 tablet by mouth nightly as needed for Sleep for up to 14 days. 14 tablet 0    predniSONE (DELTASONE) 5 MG tablet Take one tablet daily 21 tablet 0    ipratropium-albuterol (DUONEB) 0.5-2.5 (3) MG/3ML SOLN nebulizer solution Inhale 1 vial into the lungs every 4 hours      glucose monitoring kit (FREESTYLE) monitoring kit DX: Diabetes  Dispense store brand or whatever brand insurance will cover. Glucometer, lancets and testing strips for tid testing 30 days 1 kit 0     No current facility-administered medications for this visit.          Allergies   Allergen Reactions    Aspirin     Penicillins        Health Maintenance   Topic Date Due    Hepatitis C screen  1955    HIV screen  01/11/1969    DTaP/Tdap/Td vaccine (1 - Tdap) 01/11/1973    Shingles Vaccine (1 of 2) 01/11/2004    Colon cancer screen colonoscopy  01/11/2004    Diabetic retinal exam  04/16/2019    Diabetic microalbuminuria test  05/23/2019    Flu vaccine (Season Ended) 09/01/2019    Diabetic foot exam  12/07/2019    Pneumococcal 65+ years Vaccine (1 of 2 - PCV13) 12/07/2019    A1C test (Diabetic or Prediabetic)  03/21/2020    Lipid screen  03/21/2020    Potassium monitoring  03/21/2020    Creatinine monitoring  03/21/2020       Subjective:      Review of Systems    Objective:     Physical Exam   Constitutional: He is oriented to person, place, and time. He appears well-developed and well-nourished. No distress. HENT:   Head: Normocephalic. Right Ear: External ear normal.   Left Ear: External ear normal.   Nose: Nose normal.   Mouth/Throat: Oropharynx is clear and moist. No oropharyngeal exudate. Eyes: Pupils are equal, round, and reactive to light. Conjunctivae are normal.   Neck: Normal range of motion. Neck supple. Cardiovascular: Normal rate, regular rhythm and normal heart sounds. Pulmonary/Chest: Effort normal and breath sounds normal. No respiratory distress. Neurological: He is alert and oriented to person, place, and time. Skin: Skin is warm and dry. He is not diaphoretic. Psychiatric: His behavior is normal. Thought content normal.   Nursing note and vitals reviewed. /62 (Site: Right Upper Arm, Position: Sitting, Cuff Size: Large Adult)   Pulse 96   Temp 98.1 °F (36.7 °C) (Oral)   Resp 16   Wt 296 lb (134.3 kg)   SpO2 97%     Assessment:       Diagnosis Orders   1. Other insomnia  zolpidem (AMBIEN) 5 MG tablet   2. Benign essential HTN  amLODIPine (NORVASC) 5 MG tablet    lisinopril-hydrochlorothiazide (PRINZIDE;ZESTORETIC) 20-25 MG per tablet   3. Neuropathy  gabapentin (NEURONTIN) 600 MG tablet   4. Restless leg syndrome  gabapentin (NEURONTIN) 600 MG tablet   5. Type 2 diabetes mellitus with diabetic neuropathy, without long-term current use of insulin (HCC)  canagliflozin (INVOKANA) 100 MG TABS tablet    Hemoglobin A1C       Plan:      Orders Placed This Encounter   Procedures    Hemoglobin A1C     Standing Status:   Future     Number of Occurrences:   1     Standing Expiration Date:   6/14/2020       No follow-ups on file.     Orders Placed This Encounter   Procedures    Hemoglobin A1C     Standing Status:   Future     Number of Occurrences:   1     Standing Expiration Date:   6/14/2020     Orders Placed This Encounter   Medications    glipiZIDE (GLUCOTROL) 10 MG tablet     Sig: TAKE 1 TABLET BY MOUTH TWICE DAILY     Dispense:  60 tablet     Refill:  5     Please consider 90 day supplies to promote better adherence    amLODIPine (NORVASC) 5 MG tablet     Sig: Take 1 tablet by mouth nightly     Dispense:  30 tablet     Refill:  5    lisinopril-hydrochlorothiazide (PRINZIDE;ZESTORETIC) 20-25 MG per tablet     Sig: Take 1 tablet by mouth daily     Dispense:  30 tablet     Refill:  5    gabapentin (NEURONTIN) 600 MG tablet     Sig: Take 1 tablet by mouth 2 times daily for 182 days. Dispense:  180 tablet     Refill:  1    canagliflozin (INVOKANA) 100 MG TABS tablet     Sig: Take 1 tablet by mouth every morning (before breakfast)     Dispense:  30 tablet     Refill:  5    ibuprofen (ADVIL;MOTRIN) 800 MG tablet     Sig: Take 1 tablet by mouth every 8 hours as needed for Pain     Dispense:  120 tablet     Refill:  3    zolpidem (AMBIEN) 5 MG tablet     Sig: Take 1 tablet by mouth nightly as needed for Sleep for up to 14 days. Dispense:  14 tablet     Refill:  0    predniSONE (DELTASONE) 5 MG tablet     Sig: Take one tablet daily     Dispense:  21 tablet     Refill:  0       a1c today. Pt is going to try to modify diet and lose weight. He is not good candidate for weight loss med due to htn.           Electronically signed by JEVON Reid on 6/14/2019 at 1:16 PM

## 2019-06-15 ENCOUNTER — TELEPHONE (OUTPATIENT)
Dept: FAMILY MEDICINE CLINIC | Age: 64
End: 2019-06-15

## 2019-06-15 NOTE — TELEPHONE ENCOUNTER
Pt aware of results and recommendation options. Pt states wants to try strict diet and excerise. Pt aware will need to follow up in about in 3 months .

## 2019-09-20 ENCOUNTER — OFFICE VISIT (OUTPATIENT)
Dept: FAMILY MEDICINE CLINIC | Age: 64
End: 2019-09-20
Payer: COMMERCIAL

## 2019-09-20 VITALS
TEMPERATURE: 98.3 F | WEIGHT: 291 LBS | RESPIRATION RATE: 16 BRPM | SYSTOLIC BLOOD PRESSURE: 134 MMHG | DIASTOLIC BLOOD PRESSURE: 70 MMHG | HEART RATE: 104 BPM | OXYGEN SATURATION: 96 %

## 2019-09-20 DIAGNOSIS — L98.9 SKIN LESION OF SCALP: Primary | ICD-10-CM

## 2019-09-20 PROCEDURE — 99212 OFFICE O/P EST SF 10 MIN: CPT | Performed by: NURSE PRACTITIONER

## 2019-09-20 ASSESSMENT — ENCOUNTER SYMPTOMS: ROS SKIN COMMENTS: LESION

## 2019-10-17 ENCOUNTER — TELEPHONE (OUTPATIENT)
Dept: FAMILY MEDICINE CLINIC | Age: 64
End: 2019-10-17

## 2019-10-17 DIAGNOSIS — I47.29 NONSUSTAINED VENTRICULAR TACHYCARDIA: ICD-10-CM

## 2019-10-17 DIAGNOSIS — R06.02 SHORTNESS OF BREATH: ICD-10-CM

## 2019-10-17 DIAGNOSIS — I51.89 DIASTOLIC DYSFUNCTION: Primary | ICD-10-CM

## 2019-10-17 DIAGNOSIS — R94.31 ABNORMAL EKG: ICD-10-CM

## 2019-10-23 ENCOUNTER — OFFICE VISIT (OUTPATIENT)
Dept: FAMILY MEDICINE CLINIC | Age: 64
End: 2019-10-23
Payer: COMMERCIAL

## 2019-10-23 VITALS
OXYGEN SATURATION: 98 % | WEIGHT: 280 LBS | DIASTOLIC BLOOD PRESSURE: 78 MMHG | TEMPERATURE: 97.9 F | SYSTOLIC BLOOD PRESSURE: 136 MMHG | HEART RATE: 94 BPM

## 2019-10-23 DIAGNOSIS — Z23 NEED FOR VACCINATION WITH 13-POLYVALENT PNEUMOCOCCAL CONJUGATE VACCINE: ICD-10-CM

## 2019-10-23 DIAGNOSIS — J18.9 PNEUMONIA OF RIGHT LOWER LOBE DUE TO INFECTIOUS ORGANISM: Primary | ICD-10-CM

## 2019-10-23 DIAGNOSIS — F41.9 ANXIETY AND DEPRESSION: ICD-10-CM

## 2019-10-23 DIAGNOSIS — F32.A ANXIETY AND DEPRESSION: ICD-10-CM

## 2019-10-23 DIAGNOSIS — E11.40 TYPE 2 DIABETES MELLITUS WITH DIABETIC NEUROPATHY, WITHOUT LONG-TERM CURRENT USE OF INSULIN (HCC): ICD-10-CM

## 2019-10-23 DIAGNOSIS — R16.0 LIVER MASS: ICD-10-CM

## 2019-10-23 DIAGNOSIS — Z23 NEED FOR INFLUENZA VACCINATION: ICD-10-CM

## 2019-10-23 PROCEDURE — 99214 OFFICE O/P EST MOD 30 MIN: CPT | Performed by: FAMILY MEDICINE

## 2019-10-23 PROCEDURE — 90670 PCV13 VACCINE IM: CPT | Performed by: FAMILY MEDICINE

## 2019-10-23 PROCEDURE — 90653 IIV ADJUVANT VACCINE IM: CPT | Performed by: FAMILY MEDICINE

## 2019-10-23 PROCEDURE — 90472 IMMUNIZATION ADMIN EACH ADD: CPT | Performed by: FAMILY MEDICINE

## 2019-10-23 PROCEDURE — 90471 IMMUNIZATION ADMIN: CPT | Performed by: FAMILY MEDICINE

## 2019-10-23 ASSESSMENT — ENCOUNTER SYMPTOMS
EYES NEGATIVE: 1
GASTROINTESTINAL NEGATIVE: 1
ALLERGIC/IMMUNOLOGIC NEGATIVE: 1
RESPIRATORY NEGATIVE: 1

## 2019-10-28 ENCOUNTER — TELEPHONE (OUTPATIENT)
Dept: FAMILY MEDICINE CLINIC | Age: 64
End: 2019-10-28

## 2019-10-29 RX ORDER — CEFDINIR 300 MG/1
300 CAPSULE ORAL 2 TIMES DAILY
Qty: 20 CAPSULE | Refills: 0 | Status: SHIPPED | OUTPATIENT
Start: 2019-10-29 | End: 2019-11-08

## 2019-12-13 ENCOUNTER — OFFICE VISIT (OUTPATIENT)
Dept: FAMILY MEDICINE CLINIC | Age: 64
End: 2019-12-13
Payer: COMMERCIAL

## 2019-12-13 VITALS
BODY MASS INDEX: 38.6 KG/M2 | SYSTOLIC BLOOD PRESSURE: 138 MMHG | DIASTOLIC BLOOD PRESSURE: 76 MMHG | RESPIRATION RATE: 16 BRPM | HEIGHT: 72 IN | HEART RATE: 76 BPM | TEMPERATURE: 98 F | WEIGHT: 285 LBS | OXYGEN SATURATION: 94 %

## 2019-12-13 DIAGNOSIS — E11.40 TYPE 2 DIABETES MELLITUS WITH DIABETIC NEUROPATHY, WITHOUT LONG-TERM CURRENT USE OF INSULIN (HCC): ICD-10-CM

## 2019-12-13 DIAGNOSIS — I10 BENIGN ESSENTIAL HTN: ICD-10-CM

## 2019-12-13 PROCEDURE — 99213 OFFICE O/P EST LOW 20 MIN: CPT | Performed by: NURSE PRACTITIONER

## 2019-12-13 RX ORDER — AMLODIPINE BESYLATE 5 MG/1
5 TABLET ORAL NIGHTLY
Qty: 90 TABLET | Refills: 2 | Status: SHIPPED | OUTPATIENT
Start: 2019-12-13 | End: 2020-06-26 | Stop reason: SDUPTHER

## 2019-12-13 RX ORDER — GLIPIZIDE 10 MG/1
TABLET ORAL
Qty: 180 TABLET | Refills: 2 | Status: SHIPPED | OUTPATIENT
Start: 2019-12-13 | End: 2020-06-26 | Stop reason: SDUPTHER

## 2019-12-13 RX ORDER — IBUPROFEN 800 MG/1
800 TABLET ORAL EVERY 8 HOURS PRN
Qty: 120 TABLET | Refills: 3 | Status: SHIPPED | OUTPATIENT
Start: 2019-12-13 | End: 2020-06-26 | Stop reason: SDUPTHER

## 2019-12-13 ASSESSMENT — ENCOUNTER SYMPTOMS
SHORTNESS OF BREATH: 0
COUGH: 0

## 2019-12-17 ENCOUNTER — TELEPHONE (OUTPATIENT)
Dept: FAMILY MEDICINE CLINIC | Age: 64
End: 2019-12-17

## 2019-12-23 DIAGNOSIS — I10 BENIGN ESSENTIAL HTN: ICD-10-CM

## 2019-12-23 RX ORDER — LISINOPRIL AND HYDROCHLOROTHIAZIDE 25; 20 MG/1; MG/1
TABLET ORAL
Qty: 30 TABLET | Refills: 5 | Status: SHIPPED | OUTPATIENT
Start: 2019-12-23 | End: 2020-06-21 | Stop reason: SDUPTHER

## 2020-06-15 RX ORDER — GABAPENTIN 600 MG/1
600 TABLET ORAL 2 TIMES DAILY
Qty: 60 TABLET | Refills: 0 | Status: SHIPPED | OUTPATIENT
Start: 2020-06-15 | End: 2020-06-26 | Stop reason: SDUPTHER

## 2020-06-22 RX ORDER — LISINOPRIL AND HYDROCHLOROTHIAZIDE 25; 20 MG/1; MG/1
1 TABLET ORAL DAILY
Qty: 30 TABLET | Refills: 0 | Status: SHIPPED | OUTPATIENT
Start: 2020-06-22 | End: 2020-06-26 | Stop reason: SDUPTHER

## 2020-06-26 ENCOUNTER — OFFICE VISIT (OUTPATIENT)
Dept: FAMILY MEDICINE CLINIC | Age: 65
End: 2020-06-26
Payer: COMMERCIAL

## 2020-06-26 VITALS
WEIGHT: 291 LBS | DIASTOLIC BLOOD PRESSURE: 60 MMHG | SYSTOLIC BLOOD PRESSURE: 126 MMHG | TEMPERATURE: 99.7 F | OXYGEN SATURATION: 95 % | BODY MASS INDEX: 39.47 KG/M2 | HEART RATE: 88 BPM

## 2020-06-26 PROCEDURE — 90714 TD VACC NO PRESV 7 YRS+ IM: CPT | Performed by: FAMILY MEDICINE

## 2020-06-26 PROCEDURE — 99214 OFFICE O/P EST MOD 30 MIN: CPT | Performed by: FAMILY MEDICINE

## 2020-06-26 PROCEDURE — 90471 IMMUNIZATION ADMIN: CPT | Performed by: FAMILY MEDICINE

## 2020-06-26 RX ORDER — AMLODIPINE BESYLATE 5 MG/1
5 TABLET ORAL NIGHTLY
Qty: 90 TABLET | Refills: 2 | Status: SHIPPED | OUTPATIENT
Start: 2020-06-26 | End: 2021-03-15 | Stop reason: SDUPTHER

## 2020-06-26 RX ORDER — GLIPIZIDE 10 MG/1
TABLET ORAL
Qty: 180 TABLET | Refills: 2 | Status: SHIPPED | OUTPATIENT
Start: 2020-06-26 | End: 2021-03-15 | Stop reason: SDUPTHER

## 2020-06-26 RX ORDER — LISINOPRIL AND HYDROCHLOROTHIAZIDE 25; 20 MG/1; MG/1
1 TABLET ORAL DAILY
Qty: 90 TABLET | Refills: 2 | Status: SHIPPED | OUTPATIENT
Start: 2020-06-26 | End: 2021-03-15 | Stop reason: SDUPTHER

## 2020-06-26 RX ORDER — IBUPROFEN 800 MG/1
800 TABLET ORAL EVERY 8 HOURS PRN
Qty: 120 TABLET | Refills: 2 | Status: SHIPPED | OUTPATIENT
Start: 2020-06-26 | End: 2021-08-13

## 2020-06-26 RX ORDER — CANAGLIFLOZIN 100 MG/1
100 TABLET, FILM COATED ORAL
Qty: 90 TABLET | Refills: 2 | Status: SHIPPED | OUTPATIENT
Start: 2020-06-26 | End: 2021-03-15 | Stop reason: SDUPTHER

## 2020-06-26 RX ORDER — GABAPENTIN 600 MG/1
600 TABLET ORAL 2 TIMES DAILY
Qty: 60 TABLET | Refills: 0 | Status: SHIPPED | OUTPATIENT
Start: 2020-06-26 | End: 2020-08-17

## 2020-06-26 ASSESSMENT — ENCOUNTER SYMPTOMS
EYES NEGATIVE: 1
RESPIRATORY NEGATIVE: 1
ALLERGIC/IMMUNOLOGIC NEGATIVE: 1
GASTROINTESTINAL NEGATIVE: 1

## 2020-06-26 ASSESSMENT — PATIENT HEALTH QUESTIONNAIRE - PHQ9
SUM OF ALL RESPONSES TO PHQ9 QUESTIONS 1 & 2: 0
SUM OF ALL RESPONSES TO PHQ QUESTIONS 1-9: 0
SUM OF ALL RESPONSES TO PHQ QUESTIONS 1-9: 0
2. FEELING DOWN, DEPRESSED OR HOPELESS: 0
1. LITTLE INTEREST OR PLEASURE IN DOING THINGS: 0

## 2020-07-10 ENCOUNTER — VIRTUAL VISIT (OUTPATIENT)
Dept: FAMILY MEDICINE CLINIC | Age: 65
End: 2020-07-10
Payer: COMMERCIAL

## 2020-07-10 PROBLEM — I10 BENIGN ESSENTIAL HTN: Status: ACTIVE | Noted: 2020-07-10

## 2020-07-10 PROBLEM — E78.5 HYPERLIPIDEMIA: Status: ACTIVE | Noted: 2020-07-10

## 2020-07-10 PROCEDURE — 3052F HG A1C>EQUAL 8.0%<EQUAL 9.0%: CPT | Performed by: FAMILY MEDICINE

## 2020-07-10 PROCEDURE — 99214 OFFICE O/P EST MOD 30 MIN: CPT | Performed by: FAMILY MEDICINE

## 2020-07-10 RX ORDER — ROSUVASTATIN CALCIUM 10 MG/1
10 TABLET, COATED ORAL DAILY
Qty: 30 TABLET | Refills: 5 | Status: SHIPPED | OUTPATIENT
Start: 2020-07-10 | End: 2021-01-12 | Stop reason: SDUPTHER

## 2020-07-10 ASSESSMENT — ENCOUNTER SYMPTOMS
EYES NEGATIVE: 1
RESPIRATORY NEGATIVE: 1
GASTROINTESTINAL NEGATIVE: 1
ALLERGIC/IMMUNOLOGIC NEGATIVE: 1

## 2020-07-10 NOTE — PROGRESS NOTES
7/10/2020    TELEHEALTH EVALUATION -- Audio/Visual (During KOCEA-28 public health emergency)    HPI: Here for follow-up of multiple medical problems. Patient is a 50-year-old white male. He is diabetic. He is on oral medications only. Fasting blood sugar is elevated. He is noncompliant with diet. He is compliant with medication according to him. He also takes blood pressure medicine. Blood pressures well controlled but he had not check his blood pressure in a while. He have hyperlipidemia. LDL more than 70. He is not taking any statin therapy. Salud Helton (:  1955) has requested an audio/video evaluation for the following concern(s):    Follow-up on diabetes hypertension hyperlipidemia    Review of Systems   Constitutional: Negative. HENT: Negative. Eyes: Negative. Respiratory: Negative. Cardiovascular: Negative. Gastrointestinal: Negative. Endocrine: Negative. Genitourinary: Negative. Musculoskeletal: Negative. Skin: Negative. Allergic/Immunologic: Negative. Neurological: Negative. Hematological: Negative. Psychiatric/Behavioral: Negative. Prior to Visit Medications    Medication Sig Taking? Authorizing Provider   rosuvastatin (CRESTOR) 10 MG tablet Take 1 tablet by mouth daily Yes Keith Ureña MD   lisinopril-hydroCHLOROthiazide (PRINZIDE;ZESTORETIC) 20-25 MG per tablet Take 1 tablet by mouth daily Yes Keith Ureña MD   gabapentin (NEURONTIN) 600 MG tablet Take 1 tablet by mouth 2 times daily for 182 days.  Yes Keith Ureña MD   sertraline (ZOLOFT) 50 MG tablet Take 1 tablet by mouth daily Yes Keith Ureña MD   glipiZIDE (GLUCOTROL) 10 MG tablet TAKE 1 TABLET BY MOUTH TWICE DAILY Yes Keith Ureña MD   canagliflozin (INVOKANA) 100 MG TABS tablet Take 1 tablet by mouth every morning (before breakfast) Yes Keith Ureña MD   amLODIPine (NORVASC) 5 MG tablet Take 1 tablet by mouth nightly Yes Keith Ureña MD   predniSONE (DELTASONE) 5 MG tablet Take one tablet daily  Patient taking differently: Take 5 mg by mouth daily as needed Take one tablet daily Yes Jennie Soliman, APRN   ipratropium-albuterol (DUONEB) 0.5-2.5 (3) MG/3ML SOLN nebulizer solution Inhale 1 vial into the lungs every 4 hours as needed  Yes Historical Provider, MD   ibuprofen (ADVIL;MOTRIN) 800 MG tablet Take 1 tablet by mouth every 8 hours as needed for Pain  Neel Grewal MD   glucose monitoring kit (FREESTYLE) monitoring kit DX: Diabetes  Dispense store brand or whatever brand insurance will cover. Glucometer, lancets and testing strips for tid testing 30 days  Jennie Soliman, APRN       Social History     Tobacco Use    Smoking status: Never Smoker    Smokeless tobacco: Never Used   Substance Use Topics    Alcohol use: Not on file    Drug use: Not on file            PHYSICAL EXAMINATION:  [ INSTRUCTIONS:  \"[x]\" Indicates a positive item  \"[]\" Indicates a negative item  -- DELETE ALL ITEMS NOT EXAMINED]  Vital Signs: (As obtained by patient/caregiver or practitioner observation)  Patient unable to obtain vital signs today  Blood pressure-  Heart rate-    Respiratory rate-    Temperature-  Pulse oximetry-     Constitutional: [x] Appears well-developed and well-nourished [] No apparent distress      [] Abnormal-   Mental status  [x] Alert and awake  [] Oriented to person/place/time []Able to follow commands      Eyes:  EOM    [x]  Normal  [] Abnormal-  Sclera  [x]  Normal  [] Abnormal -         Discharge []  None visible  [] Abnormal -    HENT:   [x] Normocephalic, atraumatic.   [] Abnormal   [x] Mouth/Throat: Mucous membranes are moist.     External Ears [x] Normal  [] Abnormal-     Neck: [x] No visualized mass     Pulmonary/Chest: [x] Respiratory effort normal.  [] No visualized signs of difficulty breathing or respiratory distress        [] Abnormal-      Musculoskeletal:   [x] Normal gait with no signs of ataxia         [] Normal range of motion of neck        [] Abnormal- Neurological:        [x] No Facial Asymmetry (Cranial nerve 7 motor function) (limited exam to video visit)          [] No gaze palsy        [] Abnormal-         Skin:        [x] No significant exanthematous lesions or discoloration noted on facial skin         [] Abnormal-            Psychiatric:       [x] Normal Affect [] No Hallucinations        [] Abnormal-     Other pertinent observable physical exam findings-     ASSESSMENT/PLAN:  1. Type 2 diabetes mellitus with diabetic neuropathy, without long-term current use of insulin (HCC)-only fair control  - Comprehensive Metabolic Panel; Future  - CBC; Future  - Hemoglobin A1C; Future  - Urinalysis; Future  - MICROALBUMIN, RANDOM URINE (W/O CREATININE); Future  - Lipid Panel; Future  -Blood work discussed with patient.  -Encourage compliant with diet and weight loss  2. Benign essential HTN-stable  -Continue medications    3. Hyperlipidemia, unspecified hyperlipidemia type-no control  - rosuvastatin (CRESTOR) 10 MG tablet; Take 1 tablet by mouth daily  Dispense: 30 tablet; Refill: 5  - Lipid Panel; Future      Return in about 3 months (around 10/10/2020). Skye Jernigan is a 76 y.o. male being evaluated by a Virtual Visit (video visit) encounter to address concerns as mentioned above. A caregiver was present when appropriate. Due to this being a TeleHealth encounter (During XZNCL-18 public health emergency), evaluation of the following organ systems was limited: Vitals/Constitutional/EENT/Resp/CV/GI//MS/Neuro/Skin/Heme-Lymph-Imm. Pursuant to the emergency declaration under the [address] [place] [address] authority and the [employer] and [employer] Act, this Virtual Visit was conducted with patient's (and/or legal guardian's) consent, to reduce the patient's risk of exposure to COVID-19 and provide necessary medical care.   The patient (and/or legal guardian) has also been advised to contact this office for worsening conditions or problems, and seek emergency medical treatment and/or call 911 if deemed necessary. Patient identification was verified at the start of the visit: Yes    Total time spent on this encounter: 22    Services were provided through a video synchronous discussion virtually to substitute for in-person clinic visit. Patient and provider were located at their individual homes. --Marc Joe MD on 7/10/2020 at 10:46 AM    An electronic signature was used to authenticate this note.

## 2020-08-17 RX ORDER — GABAPENTIN 600 MG/1
600 TABLET ORAL 2 TIMES DAILY
Qty: 60 TABLET | Refills: 0 | Status: SHIPPED | OUTPATIENT
Start: 2020-08-17 | End: 2020-09-13 | Stop reason: SDUPTHER

## 2020-09-14 RX ORDER — GABAPENTIN 600 MG/1
600 TABLET ORAL 2 TIMES DAILY
Qty: 60 TABLET | Refills: 5 | Status: SHIPPED | OUTPATIENT
Start: 2020-09-14 | End: 2021-03-15 | Stop reason: SDUPTHER

## 2020-09-23 ENCOUNTER — NURSE ONLY (OUTPATIENT)
Dept: FAMILY MEDICINE CLINIC | Age: 65
End: 2020-09-23
Payer: COMMERCIAL

## 2020-09-23 PROCEDURE — 90694 VACC AIIV4 NO PRSRV 0.5ML IM: CPT | Performed by: FAMILY MEDICINE

## 2020-09-23 PROCEDURE — 90471 IMMUNIZATION ADMIN: CPT | Performed by: FAMILY MEDICINE

## 2021-01-12 DIAGNOSIS — E78.5 HYPERLIPIDEMIA, UNSPECIFIED HYPERLIPIDEMIA TYPE: ICD-10-CM

## 2021-01-13 RX ORDER — ROSUVASTATIN CALCIUM 10 MG/1
10 TABLET, COATED ORAL DAILY
Qty: 30 TABLET | Refills: 5 | Status: SHIPPED | OUTPATIENT
Start: 2021-01-13 | End: 2021-03-15 | Stop reason: SDUPTHER

## 2021-03-15 ENCOUNTER — OFFICE VISIT (OUTPATIENT)
Dept: FAMILY MEDICINE CLINIC | Age: 66
End: 2021-03-15
Payer: MEDICARE

## 2021-03-15 VITALS
TEMPERATURE: 96.9 F | BODY MASS INDEX: 37.72 KG/M2 | OXYGEN SATURATION: 95 % | HEART RATE: 90 BPM | SYSTOLIC BLOOD PRESSURE: 128 MMHG | WEIGHT: 284.6 LBS | HEIGHT: 73 IN | DIASTOLIC BLOOD PRESSURE: 76 MMHG

## 2021-03-15 DIAGNOSIS — E78.5 HYPERLIPIDEMIA, UNSPECIFIED HYPERLIPIDEMIA TYPE: ICD-10-CM

## 2021-03-15 DIAGNOSIS — G62.9 NEUROPATHY: ICD-10-CM

## 2021-03-15 DIAGNOSIS — E11.40 TYPE 2 DIABETES MELLITUS WITH DIABETIC NEUROPATHY, WITHOUT LONG-TERM CURRENT USE OF INSULIN (HCC): Primary | ICD-10-CM

## 2021-03-15 DIAGNOSIS — E11.40 TYPE 2 DIABETES MELLITUS WITH DIABETIC NEUROPATHY, WITHOUT LONG-TERM CURRENT USE OF INSULIN (HCC): ICD-10-CM

## 2021-03-15 DIAGNOSIS — I10 BENIGN ESSENTIAL HTN: ICD-10-CM

## 2021-03-15 DIAGNOSIS — F32.A ANXIETY AND DEPRESSION: ICD-10-CM

## 2021-03-15 DIAGNOSIS — G25.81 RESTLESS LEG SYNDROME: ICD-10-CM

## 2021-03-15 DIAGNOSIS — F41.9 ANXIETY AND DEPRESSION: ICD-10-CM

## 2021-03-15 DIAGNOSIS — Z12.11 COLON CANCER SCREENING: ICD-10-CM

## 2021-03-15 LAB
ALBUMIN SERPL-MCNC: 4.2 G/DL (ref 3.5–5.2)
ALP BLD-CCNC: 76 U/L (ref 40–130)
ALT SERPL-CCNC: 20 U/L (ref 5–41)
ANION GAP SERPL CALCULATED.3IONS-SCNC: 12 MMOL/L (ref 7–19)
AST SERPL-CCNC: 15 U/L (ref 5–40)
BILIRUB SERPL-MCNC: 0.3 MG/DL (ref 0.2–1.2)
BILIRUBIN URINE: NEGATIVE
BLOOD, URINE: NEGATIVE
BUN BLDV-MCNC: 25 MG/DL (ref 8–23)
CALCIUM SERPL-MCNC: 9.3 MG/DL (ref 8.8–10.2)
CHLORIDE BLD-SCNC: 101 MMOL/L (ref 98–111)
CHOLESTEROL, TOTAL: 120 MG/DL (ref 160–199)
CLARITY: CLEAR
CO2: 28 MMOL/L (ref 22–29)
COLOR: YELLOW
CREAT SERPL-MCNC: 0.9 MG/DL (ref 0.5–1.2)
GFR AFRICAN AMERICAN: >59
GFR NON-AFRICAN AMERICAN: >60
GLUCOSE BLD-MCNC: 133 MG/DL (ref 74–109)
GLUCOSE URINE: =>1000 MG/DL
HBA1C MFR BLD: 9.9 % (ref 4–6)
HCT VFR BLD CALC: 49.6 % (ref 42–52)
HDLC SERPL-MCNC: 39 MG/DL (ref 55–121)
HEMOGLOBIN: 15.6 G/DL (ref 14–18)
KETONES, URINE: NEGATIVE MG/DL
LDL CHOLESTEROL CALCULATED: 52 MG/DL
LEUKOCYTE ESTERASE, URINE: NEGATIVE
MCH RBC QN AUTO: 28 PG (ref 27–31)
MCHC RBC AUTO-ENTMCNC: 31.5 G/DL (ref 33–37)
MCV RBC AUTO: 88.9 FL (ref 80–94)
MICROALBUMIN UR-MCNC: <1.2 MG/DL (ref 0–19)
NITRITE, URINE: NEGATIVE
PDW BLD-RTO: 14.3 % (ref 11.5–14.5)
PH UA: 5.5 (ref 5–8)
PLATELET # BLD: 219 K/UL (ref 130–400)
PMV BLD AUTO: 10.2 FL (ref 9.4–12.4)
POTASSIUM SERPL-SCNC: 4.2 MMOL/L (ref 3.5–5)
PROTEIN UA: NEGATIVE MG/DL
RBC # BLD: 5.58 M/UL (ref 4.7–6.1)
SODIUM BLD-SCNC: 141 MMOL/L (ref 136–145)
SPECIFIC GRAVITY UA: 1.03 (ref 1–1.03)
TOTAL PROTEIN: 7.7 G/DL (ref 6.6–8.7)
TRIGL SERPL-MCNC: 145 MG/DL (ref 0–149)
UROBILINOGEN, URINE: 0.2 E.U./DL
WBC # BLD: 7.7 K/UL (ref 4.8–10.8)

## 2021-03-15 PROCEDURE — 3017F COLORECTAL CA SCREEN DOC REV: CPT | Performed by: FAMILY MEDICINE

## 2021-03-15 PROCEDURE — G8484 FLU IMMUNIZE NO ADMIN: HCPCS | Performed by: FAMILY MEDICINE

## 2021-03-15 PROCEDURE — 1123F ACP DISCUSS/DSCN MKR DOCD: CPT | Performed by: FAMILY MEDICINE

## 2021-03-15 PROCEDURE — 99214 OFFICE O/P EST MOD 30 MIN: CPT | Performed by: FAMILY MEDICINE

## 2021-03-15 PROCEDURE — 4040F PNEUMOC VAC/ADMIN/RCVD: CPT | Performed by: FAMILY MEDICINE

## 2021-03-15 PROCEDURE — 2022F DILAT RTA XM EVC RTNOPTHY: CPT | Performed by: FAMILY MEDICINE

## 2021-03-15 PROCEDURE — 1036F TOBACCO NON-USER: CPT | Performed by: FAMILY MEDICINE

## 2021-03-15 PROCEDURE — G8417 CALC BMI ABV UP PARAM F/U: HCPCS | Performed by: FAMILY MEDICINE

## 2021-03-15 PROCEDURE — 3046F HEMOGLOBIN A1C LEVEL >9.0%: CPT | Performed by: FAMILY MEDICINE

## 2021-03-15 PROCEDURE — G8427 DOCREV CUR MEDS BY ELIG CLIN: HCPCS | Performed by: FAMILY MEDICINE

## 2021-03-15 RX ORDER — CANAGLIFLOZIN 100 MG/1
100 TABLET, FILM COATED ORAL
Qty: 90 TABLET | Refills: 3 | Status: SHIPPED | OUTPATIENT
Start: 2021-03-15 | End: 2021-06-29

## 2021-03-15 RX ORDER — GABAPENTIN 600 MG/1
600 TABLET ORAL 2 TIMES DAILY
Qty: 60 TABLET | Refills: 5 | Status: SHIPPED | OUTPATIENT
Start: 2021-03-15 | End: 2021-09-13

## 2021-03-15 RX ORDER — LISINOPRIL AND HYDROCHLOROTHIAZIDE 25; 20 MG/1; MG/1
1 TABLET ORAL DAILY
Qty: 90 TABLET | Refills: 3 | Status: SHIPPED | OUTPATIENT
Start: 2021-03-15 | End: 2021-04-13

## 2021-03-15 RX ORDER — AMLODIPINE BESYLATE 5 MG/1
5 TABLET ORAL NIGHTLY
Qty: 90 TABLET | Refills: 3 | Status: SHIPPED | OUTPATIENT
Start: 2021-03-15 | End: 2021-08-13

## 2021-03-15 RX ORDER — GLIPIZIDE 10 MG/1
TABLET ORAL
Qty: 180 TABLET | Refills: 2 | Status: SHIPPED | OUTPATIENT
Start: 2021-03-15 | End: 2022-03-21 | Stop reason: SDUPTHER

## 2021-03-15 RX ORDER — ROSUVASTATIN CALCIUM 10 MG/1
10 TABLET, COATED ORAL DAILY
Qty: 90 TABLET | Refills: 3 | Status: SHIPPED | OUTPATIENT
Start: 2021-03-15 | End: 2022-03-21 | Stop reason: SDUPTHER

## 2021-03-15 ASSESSMENT — ENCOUNTER SYMPTOMS
GASTROINTESTINAL NEGATIVE: 1
EYES NEGATIVE: 1
RESPIRATORY NEGATIVE: 1
ALLERGIC/IMMUNOLOGIC NEGATIVE: 1

## 2021-03-15 NOTE — PROGRESS NOTES
SUBJECTIVE:    Patient ID: Alanna Mccormack is a 78 y.o.male. HPI:   Patient here for follow-up of multiple medical problems. Patient is a 79-year-old white male. He have past medical history significant for diabetes. He is on oral medications only. His fasting blood sugars have been elevated according to him. He retired he has not been compliant with diet or exercise. He is overdue for blood work. He also have hypertension. Take blood pressure medicine. Blood pressure is well controlled. Denies medication side effect. He is on cholesterol medication for hyperlipidemia in combination with hypertension and diabetes. He is due for blood work. He have history of depression anxiety and takes Zoloft with good results. Denies medication side effect. He have history of restless leg syndrome and neuropathy. He takes Neurontin for neuropathy and seems to help those problems. Health maintenance  Patient is due for colonoscopy but he refused his dad. He is willing to do Cologuard. Patient would like to have COVID-19 vaccine.        Past Medical History:   Diagnosis Date    Benign essential HTN 7/10/2020    HTN (hypertension)     Hyperlipidemia 7/10/2020    Pneumonia     Type 2 diabetes mellitus ([address] 5/2016      Current Outpatient Medications   Medication Sig Dispense Refill    rosuvastatin (CRESTOR) 10 MG tablet Take 1 tablet by mouth daily 90 tablet 3    lisinopril-hydroCHLOROthiazide (PRINZIDE;ZESTORETIC) 20-25 MG per tablet Take 1 tablet by mouth daily 90 tablet 3    sertraline (ZOLOFT) 50 MG tablet Take 1 tablet by mouth daily 90 tablet 3    glipiZIDE (GLUCOTROL) 10 MG tablet TAKE 1 TABLET BY MOUTH TWICE DAILY 180 tablet 2    canagliflozin (INVOKANA) 100 MG TABS tablet Take 1 tablet by mouth every morning (before breakfast) 90 tablet 3    amLODIPine (NORVASC) 5 MG tablet Take 1 tablet by mouth nightly 90 tablet 3    gabapentin (NEURONTIN) 600 MG tablet Take 1 tablet by mouth 2 times daily for 30 days. 60 tablet 5    ibuprofen (ADVIL;MOTRIN) 800 MG tablet Take 1 tablet by mouth every 8 hours as needed for Pain 120 tablet 2    predniSONE (DELTASONE) 5 MG tablet Take one tablet daily (Patient taking differently: Take 5 mg by mouth daily as needed Take one tablet daily) 21 tablet 0    ipratropium-albuterol (DUONEB) 0.5-2.5 (3) MG/3ML SOLN nebulizer solution Inhale 1 vial into the lungs every 4 hours as needed       glucose monitoring kit (FREESTYLE) monitoring kit DX: Diabetes  Dispense store brand or whatever brand insurance will cover. Glucometer, lancets and testing strips for tid testing 30 days 1 kit 0     No current facility-administered medications for this visit. Allergies   Allergen Reactions    Aspirin     Penicillins        Review of Systems   Constitutional: Negative. HENT: Negative. Eyes: Negative. Respiratory: Negative. Cardiovascular: Negative. Gastrointestinal: Negative. Endocrine: Negative. Genitourinary: Negative. Musculoskeletal: Negative. Skin: Negative. Allergic/Immunologic: Negative. Neurological: Negative. Hematological: Negative. Psychiatric/Behavioral: Negative. OBJECTIVE:    Physical Exam  Vitals signs reviewed. Constitutional:       Appearance: Normal appearance. He is well-developed. He is obese. HENT:      Head: Normocephalic and atraumatic. Right Ear: Tympanic membrane, ear canal and external ear normal. There is no impacted cerumen. Left Ear: Tympanic membrane, ear canal and external ear normal. There is no impacted cerumen. Nose: Nose normal.      Mouth/Throat:      Lips: Pink. Mouth: Mucous membranes are moist.      Dentition: Normal dentition. Tongue: No lesions. Pharynx: Oropharynx is clear. Uvula midline. Tonsils: No tonsillar exudate or tonsillar abscesses. Eyes:      General: Lids are normal.         Right eye: No discharge. Left eye: No discharge.       Extraocular Movements:      Right eye: Normal extraocular motion. Left eye: Normal extraocular motion. Conjunctiva/sclera: Conjunctivae normal.      Right eye: Right conjunctiva is not injected. Left eye: Left conjunctiva is not injected. Pupils: Pupils are equal, round, and reactive to light. Neck:      Musculoskeletal: Normal range of motion and neck supple. Thyroid: No thyromegaly. Vascular: No carotid bruit or JVD. Cardiovascular:      Rate and Rhythm: Normal rate and regular rhythm. Pulses:           Carotid pulses are 2+ on the right side and 2+ on the left side. Radial pulses are 2+ on the right side and 2+ on the left side. Heart sounds: Normal heart sounds, S1 normal and S2 normal. No murmur. Pulmonary:      Effort: Pulmonary effort is normal. No accessory muscle usage. Breath sounds: Normal breath sounds. Abdominal:      General: Bowel sounds are normal. There is no distension or abdominal bruit. Palpations: Abdomen is soft. There is no mass. Tenderness: There is no abdominal tenderness. Hernia: No hernia is present. Musculoskeletal: Normal range of motion. Right lower leg: No edema. Left lower leg: No edema. Lymphadenopathy:      Cervical:      Right cervical: No superficial cervical adenopathy. Left cervical: No superficial cervical adenopathy. Skin:     General: Skin is warm and dry. Coloration: Skin is not jaundiced or pale. Findings: No lesion or rash. Nails: There is no clubbing. Neurological:      Mental Status: He is alert and oriented to person, place, and time. Cranial Nerves: No facial asymmetry. Motor: No weakness or tremor. Coordination: Coordination normal.      Gait: Gait normal.      Deep Tendon Reflexes: Reflexes are normal and symmetric.    Psychiatric:         Attention and Perception: Attention normal.         Mood and Affect: Mood normal.         Speech: Speech normal. Behavior: Behavior normal.         Thought Content: Thought content normal.         Cognition and Memory: Memory normal.         Judgment: Judgment normal.        /76 (Site: Left Upper Arm, Position: Sitting, Cuff Size: Medium Adult)   Pulse 90   Temp 96.9 °F (36.1 °C) (Temporal)   Ht 6' 1\" (1.854 m)   Wt 284 lb 9.6 oz (129.1 kg)   SpO2 95%   BMI 37.55 kg/m²      ASSESSMENT:     Diagnosis Orders   1. Type 2 diabetes mellitus with diabetic neuropathy, without long-term current use of insulin (HCC)need blood work but suspect poor control glipiZIDE (GLUCOTROL) 10 MG tablet    canagliflozin (INVOKANA) 100 MG TABS tablet   2. Neuropathystable gabapentin (NEURONTIN) 600 MG tablet   3. Benign essential HTNwell-controlled lisinopril-hydroCHLOROthiazide (PRINZIDE;ZESTORETIC) 20-25 MG per tablet    amLODIPine (NORVASC) 5 MG tablet   4. Hyperlipidemia, unspecified hyperlipidemia typeon therapy rosuvastatin (CRESTOR) 10 MG tablet   5. Anxiety and depressionwell-controlled sertraline (ZOLOFT) 50 MG tablet   6. Restless leg syndromestable gabapentin (NEURONTIN) 600 MG tablet   7. Colon cancer screening  Cologuard        PLAN:    1. Obtain blood work. Continue medications for now. Courage diet and exercise. 2.  [place] and continue medication  3. Continue medication  4. Continue medication obtain blood work. 5.  Continue medication  6. [place] and continue medication  7. Cologuard. Follow-up in 3 months and will arrange COVID-19 vaccine through [facility].

## 2021-03-17 ENCOUNTER — OFFICE VISIT (OUTPATIENT)
Dept: FAMILY MEDICINE CLINIC | Age: 66
End: 2021-03-17
Payer: MEDICARE

## 2021-03-17 VITALS
TEMPERATURE: 97 F | BODY MASS INDEX: 37.81 KG/M2 | HEART RATE: 95 BPM | WEIGHT: 286.6 LBS | SYSTOLIC BLOOD PRESSURE: 126 MMHG | DIASTOLIC BLOOD PRESSURE: 76 MMHG | OXYGEN SATURATION: 96 %

## 2021-03-17 DIAGNOSIS — E11.40 TYPE 2 DIABETES MELLITUS WITH DIABETIC NEUROPATHY, WITHOUT LONG-TERM CURRENT USE OF INSULIN (HCC): Primary | ICD-10-CM

## 2021-03-17 PROCEDURE — 2022F DILAT RTA XM EVC RTNOPTHY: CPT | Performed by: FAMILY MEDICINE

## 2021-03-17 PROCEDURE — 3046F HEMOGLOBIN A1C LEVEL >9.0%: CPT | Performed by: FAMILY MEDICINE

## 2021-03-17 PROCEDURE — 1036F TOBACCO NON-USER: CPT | Performed by: FAMILY MEDICINE

## 2021-03-17 PROCEDURE — G8417 CALC BMI ABV UP PARAM F/U: HCPCS | Performed by: FAMILY MEDICINE

## 2021-03-17 PROCEDURE — G8484 FLU IMMUNIZE NO ADMIN: HCPCS | Performed by: FAMILY MEDICINE

## 2021-03-17 PROCEDURE — 1123F ACP DISCUSS/DSCN MKR DOCD: CPT | Performed by: FAMILY MEDICINE

## 2021-03-17 PROCEDURE — G8427 DOCREV CUR MEDS BY ELIG CLIN: HCPCS | Performed by: FAMILY MEDICINE

## 2021-03-17 PROCEDURE — 4040F PNEUMOC VAC/ADMIN/RCVD: CPT | Performed by: FAMILY MEDICINE

## 2021-03-17 PROCEDURE — 3017F COLORECTAL CA SCREEN DOC REV: CPT | Performed by: FAMILY MEDICINE

## 2021-03-17 PROCEDURE — 99213 OFFICE O/P EST LOW 20 MIN: CPT | Performed by: FAMILY MEDICINE

## 2021-03-17 ASSESSMENT — ENCOUNTER SYMPTOMS
GASTROINTESTINAL NEGATIVE: 1
EYES NEGATIVE: 1
ALLERGIC/IMMUNOLOGIC NEGATIVE: 1
RESPIRATORY NEGATIVE: 1

## 2021-03-17 NOTE — PROGRESS NOTES
SUBJECTIVE:    Patient ID: Domenica Ferrer is a 78 y.o.male. HPI:   Here for follow-up of multiple medical problems. Patient is a 70-year-old white male. He is morbid obese and diabetic. He is on sulfonylureas and Invokana. He is compliant with medication but not diet or exercise. Fasting blood sugars in the 130s with A1c close to 10. He think that he can bring it down with just diet modifications. He does not want to use insulin therapy. He have no diabetic complications. Past Medical History:   Diagnosis Date    Benign essential HTN 7/10/2020    HTN (hypertension)     Hyperlipidemia 7/10/2020    Pneumonia     Type 2 diabetes mellitus ([facility] [address] 5/2016      Current Outpatient Medications   Medication Sig Dispense Refill    rosuvastatin (CRESTOR) 10 MG tablet Take 1 tablet by mouth daily 90 tablet 3    lisinopril-hydroCHLOROthiazide (PRINZIDE;ZESTORETIC) 20-25 MG per tablet Take 1 tablet by mouth daily 90 tablet 3    sertraline (ZOLOFT) 50 MG tablet Take 1 tablet by mouth daily 90 tablet 3    glipiZIDE (GLUCOTROL) 10 MG tablet TAKE 1 TABLET BY MOUTH TWICE DAILY 180 tablet 2    canagliflozin (INVOKANA) 100 MG TABS tablet Take 1 tablet by mouth every morning (before breakfast) 90 tablet 3    amLODIPine (NORVASC) 5 MG tablet Take 1 tablet by mouth nightly 90 tablet 3    gabapentin (NEURONTIN) 600 MG tablet Take 1 tablet by mouth 2 times daily for 30 days. 60 tablet 5    ibuprofen (ADVIL;MOTRIN) 800 MG tablet Take 1 tablet by mouth every 8 hours as needed for Pain 120 tablet 2    ipratropium-albuterol (DUONEB) 0.5-2.5 (3) MG/3ML SOLN nebulizer solution Inhale 1 vial into the lungs every 4 hours as needed       glucose monitoring kit (FREESTYLE) monitoring kit DX: Diabetes  Dispense store brand or whatever brand insurance will cover. Glucometer, lancets and testing strips for tid testing 30 days 1 kit 0     No current facility-administered medications for this visit.        Allergies   Allergen Reactions    Aspirin     Penicillins        Review of Systems   Constitutional: Negative. HENT: Negative. Eyes: Negative. Respiratory: Negative. Cardiovascular: Negative. Gastrointestinal: Negative. Endocrine: Negative. Genitourinary: Negative. Musculoskeletal: Negative. Skin: Negative. Allergic/Immunologic: Negative. Neurological: Negative. Hematological: Negative. Psychiatric/Behavioral: Negative. OBJECTIVE:    Physical Exam  Vitals signs reviewed. Constitutional:       Appearance: Normal appearance. He is well-developed. He is obese. HENT:      Head: Normocephalic and atraumatic. Right Ear: Tympanic membrane, ear canal and external ear normal. There is no impacted cerumen. Left Ear: Tympanic membrane, ear canal and external ear normal. There is no impacted cerumen. Nose: Nose normal.      Mouth/Throat:      Lips: Pink. Mouth: Mucous membranes are moist.      Dentition: Normal dentition. Tongue: No lesions. Pharynx: Oropharynx is clear. Uvula midline. Tonsils: No tonsillar exudate or tonsillar abscesses. Eyes:      General: Lids are normal.         Right eye: No discharge. Left eye: No discharge. Extraocular Movements:      Right eye: Normal extraocular motion. Left eye: Normal extraocular motion. Conjunctiva/sclera: Conjunctivae normal.      Right eye: Right conjunctiva is not injected. Left eye: Left conjunctiva is not injected. Pupils: Pupils are equal, round, and reactive to light. Neck:      Musculoskeletal: Normal range of motion and neck supple. Thyroid: No thyromegaly. Vascular: No carotid bruit or JVD. Cardiovascular:      Rate and Rhythm: Normal rate and regular rhythm. Pulses:           Carotid pulses are 2+ on the right side and 2+ on the left side. Radial pulses are 2+ on the right side and 2+ on the left side.       Heart sounds: Normal heart sounds, S1 normal and S2 normal. No murmur. Pulmonary:      Effort: Pulmonary effort is normal. No accessory muscle usage. Breath sounds: Normal breath sounds. Abdominal:      General: Bowel sounds are normal. There is no distension or abdominal bruit. Palpations: Abdomen is soft. There is no mass. Tenderness: There is no abdominal tenderness. Hernia: No hernia is present. Musculoskeletal: Normal range of motion. Right lower leg: No edema. Left lower leg: No edema. Lymphadenopathy:      Cervical:      Right cervical: No superficial cervical adenopathy. Left cervical: No superficial cervical adenopathy. Skin:     General: Skin is warm and dry. Coloration: Skin is not jaundiced or pale. Findings: No lesion or rash. Nails: There is no clubbing. Neurological:      Mental Status: He is alert and oriented to person, place, and time. Cranial Nerves: No facial asymmetry. Motor: No weakness or tremor. Coordination: Coordination normal.      Gait: Gait normal.      Deep Tendon Reflexes: Reflexes are normal and symmetric. Psychiatric:         Attention and Perception: Attention normal.         Mood and Affect: Mood normal.         Speech: Speech normal.         Behavior: Behavior normal.         Thought Content: Thought content normal.         Cognition and Memory: Memory normal.         Judgment: Judgment normal.        /76 (Site: Left Upper Arm, Position: Sitting, Cuff Size: Medium Adult)   Pulse 95   Temp 97 °F (36.1 °C) (Temporal)   Wt 286 lb 9.6 oz (130 kg)   SpO2 96%   BMI 37.81 kg/m²      ASSESSMENT:     Diagnosis Orders   1. Type 2 diabetes mellitus with diabetic neuropathy, without long-term current use of insulin (HCC) poorly controlled CBC    Comprehensive Metabolic Panel    Hemoglobin A1C    Lipid Panel    MICROALBUMIN, RANDOM URINE (W/O CREATININE)    Urinalysis        PLAN:    1. Lab work discussed with patient.   Strict diet and exercise. Repeat blood work in 3 months. If not better will add insulin.

## 2021-04-01 ENCOUNTER — IMMUNIZATION (OUTPATIENT)
Age: 66
End: 2021-04-01
Payer: MEDICARE

## 2021-04-01 PROCEDURE — 0001A COVID-19, PFIZER VACCINE 30MCG/0.3ML DOSE: CPT | Performed by: FAMILY MEDICINE

## 2021-04-01 PROCEDURE — 91300 COVID-19, PFIZER VACCINE 30MCG/0.3ML DOSE: CPT | Performed by: FAMILY MEDICINE

## 2021-04-13 DIAGNOSIS — I10 BENIGN ESSENTIAL HTN: ICD-10-CM

## 2021-04-13 RX ORDER — LISINOPRIL AND HYDROCHLOROTHIAZIDE 25; 20 MG/1; MG/1
TABLET ORAL
Qty: 90 TABLET | Refills: 3 | Status: SHIPPED | OUTPATIENT
Start: 2021-04-13 | End: 2022-03-21 | Stop reason: SDUPTHER

## 2021-04-14 DIAGNOSIS — R19.5 POSITIVE COLORECTAL CANCER SCREENING USING COLOGUARD TEST: Primary | ICD-10-CM

## 2021-04-22 ENCOUNTER — IMMUNIZATION (OUTPATIENT)
Age: 66
End: 2021-04-22
Payer: MEDICARE

## 2021-04-22 PROCEDURE — 91300 COVID-19, PFIZER VACCINE 30MCG/0.3ML DOSE: CPT | Performed by: FAMILY MEDICINE

## 2021-04-22 PROCEDURE — 0002A PR IMM ADMN SARSCOV2 30MCG/0.3ML DIL RECON 2ND DOSE: CPT | Performed by: FAMILY MEDICINE

## 2021-05-03 ENCOUNTER — OFFICE VISIT (OUTPATIENT)
Age: 66
End: 2021-05-03

## 2021-05-03 VITALS — OXYGEN SATURATION: 98 % | HEART RATE: 64 BPM

## 2021-05-03 DIAGNOSIS — Z11.59 SCREENING FOR VIRAL DISEASE: Primary | ICD-10-CM

## 2021-05-03 LAB — SARS-COV-2, PCR: NOT DETECTED

## 2021-05-03 PROCEDURE — 99999 PR OFFICE/OUTPT VISIT,PROCEDURE ONLY: CPT | Performed by: NURSE PRACTITIONER

## 2021-05-05 ENCOUNTER — ANESTHESIA EVENT (OUTPATIENT)
Dept: OPERATING ROOM | Age: 66
End: 2021-05-05

## 2021-05-06 ENCOUNTER — APPOINTMENT (OUTPATIENT)
Dept: OPERATING ROOM | Age: 66
End: 2021-05-06

## 2021-05-06 ENCOUNTER — HOSPITAL ENCOUNTER (OUTPATIENT)
Age: 66
Setting detail: SPECIMEN
Discharge: HOME | End: 2021-05-06
Payer: MEDICARE

## 2021-05-06 ENCOUNTER — ANESTHESIA (OUTPATIENT)
Dept: OPERATING ROOM | Age: 66
End: 2021-05-06

## 2021-05-06 ENCOUNTER — HOSPITAL ENCOUNTER (OUTPATIENT)
Age: 66
Setting detail: OUTPATIENT SURGERY
Discharge: HOME | End: 2021-05-06
Attending: INTERNAL MEDICINE | Admitting: INTERNAL MEDICINE
Payer: MEDICARE

## 2021-05-06 VITALS
TEMPERATURE: 99.3 F | WEIGHT: 285 LBS | SYSTOLIC BLOOD PRESSURE: 122 MMHG | DIASTOLIC BLOOD PRESSURE: 71 MMHG | OXYGEN SATURATION: 93 % | HEIGHT: 73 IN | HEART RATE: 91 BPM | BODY MASS INDEX: 37.77 KG/M2 | RESPIRATION RATE: 18 BRPM

## 2021-05-06 VITALS — SYSTOLIC BLOOD PRESSURE: 133 MMHG | DIASTOLIC BLOOD PRESSURE: 79 MMHG | OXYGEN SATURATION: 89 %

## 2021-05-06 PROCEDURE — 45385 COLONOSCOPY W/LESION REMOVAL: CPT

## 2021-05-06 PROCEDURE — G8918 PT W/O PREOP ORDER IV AB PRO: HCPCS

## 2021-05-06 PROCEDURE — 88305 TISSUE EXAM BY PATHOLOGIST: CPT

## 2021-05-06 PROCEDURE — G8907 PT DOC NO EVENTS ON DISCHARG: HCPCS

## 2021-05-06 PROCEDURE — 45385 COLONOSCOPY W/LESION REMOVAL: CPT | Performed by: INTERNAL MEDICINE

## 2021-05-06 RX ORDER — FENTANYL CITRATE 50 UG/ML
INJECTION, SOLUTION INTRAMUSCULAR; INTRAVENOUS PRN
Status: DISCONTINUED | OUTPATIENT
Start: 2021-05-06 | End: 2021-05-06 | Stop reason: SDUPTHER

## 2021-05-06 RX ORDER — SODIUM CHLORIDE 9 MG/ML
INJECTION, SOLUTION INTRAVENOUS CONTINUOUS
Status: DISCONTINUED | OUTPATIENT
Start: 2021-05-06 | End: 2021-05-06 | Stop reason: HOSPADM

## 2021-05-06 RX ORDER — PROPOFOL 10 MG/ML
INJECTION, EMULSION INTRAVENOUS PRN
Status: DISCONTINUED | OUTPATIENT
Start: 2021-05-06 | End: 2021-05-06 | Stop reason: SDUPTHER

## 2021-05-06 RX ORDER — LIDOCAINE HYDROCHLORIDE 10 MG/ML
INJECTION, SOLUTION INFILTRATION; PERINEURAL PRN
Status: DISCONTINUED | OUTPATIENT
Start: 2021-05-06 | End: 2021-05-06 | Stop reason: SDUPTHER

## 2021-05-06 RX ADMIN — LIDOCAINE HYDROCHLORIDE 40 MG: 10 INJECTION, SOLUTION INFILTRATION; PERINEURAL at 08:34

## 2021-05-06 RX ADMIN — PROPOFOL 420 MG: 10 INJECTION, EMULSION INTRAVENOUS at 08:34

## 2021-05-06 RX ADMIN — FENTANYL CITRATE 50 MCG: 50 INJECTION, SOLUTION INTRAMUSCULAR; INTRAVENOUS at 08:33

## 2021-05-06 RX ADMIN — SODIUM CHLORIDE: 9 INJECTION, SOLUTION INTRAVENOUS at 07:34

## 2021-05-06 SDOH — HEALTH STABILITY: MENTAL HEALTH: HOW OFTEN DO YOU HAVE A DRINK CONTAINING ALCOHOL?: NEVER

## 2021-05-06 NOTE — OP NOTE
Patient: Bhavna Tejeda : 1955  [facility] Rec#: [id number] Acc#: [id number]   Primary Care Provider Russel Padilla MD    Date of Procedure:  2021    Endoscopist: Evy Franklin MD    Referring Provider: Russel Padilla MD    Operation Performed: Colonoscopy with hot snare polypectomy     Indications: Screening- positive cologuard    Anesthesia:  Sedation was administered by anesthesia who monitored the patient during the procedure. I met with Bhavna Tejeda prior to procedure. We discussed the procedure itself, and I have discussed the risks of endoscopy (including-- but not limited to-- pain, discomfort, bleeding potentially requiring second endoscopic procedure and/or blood transfusion, organ perforation requiring operative repair, damage to organs near the colon, infection, aspiration, cardiopulmonary/allergic reaction), benefits, indications to endoscopy. Additionally, we discussed options other than colonoscopy. The patient expressed understanding. All questions answered. The patient decided to proceed with the procedure. Signed informed consent was placed on the chart. Blood Loss: minimal    Withdrawal time: > 6 min  Bowel Prep: adequate     Complications: no immediate complications    DESCRIPTION OF PROCEDURE:     A time out was performed. After written informed consent was obtained, the patient was placed in the left lateral position. The perianal area was inspected, and a digital rectal exam was performed. A rectal exam was performed: normal tone, no palpable lesions. At this point, a forward viewing Olympus colonoscope was inserted into the anus and carefully advanced to the cecum. The cecum was identified by the ileocecal valve and the appendiceal orifice. The colonoscope was then slowly withdrawn with careful inspection of the mucosa in a linear and circumferential fashion. The scope was retroflexed in the rectum.  Suction was utilized during the procedure to remove as much air as possible

## 2021-05-06 NOTE — H&P
Patient Name: Krystina Rivero  : 1955  MRN: [id number]  DATE: 21    Allergies: Allergies   Allergen Reactions    Aspirin     Penicillins         ENDOSCOPY  History and Physical    Procedure:    [] Diagnostic Colonoscopy       [x] Screening Colonoscopy  [] EGD      [] ERCP      [] EUS       [] Other    [x] Previous office notes/History and Physical reviewed from the patients chart. Please see EMR for further details of HPI. I have examined the patient's status immediately prior to the procedure and:      Indications/HPI:       [x] Screening/ + cologuard              [] History of Polyps      []Fhx of colon CA/polyps       Anesthesia:   [x] MAC [] Moderate Sedation   [] General   [] None     ROS: 12 pt review of systems was negative unless stated above    Medications:   Prior to Admission medications    Medication Sig Start Date End Date Taking? Authorizing Provider   lisinopril-hydroCHLOROthiazide (PRINZIDE;ZESTORETIC) 20-25 MG per tablet Take 1 tablet by mouth once daily 21   Karen Andrew MD   rosuvastatin (CRESTOR) 10 MG tablet Take 1 tablet by mouth daily 3/15/21   Karen Andrew MD   sertraline (ZOLOFT) 50 MG tablet Take 1 tablet by mouth daily 3/15/21   Karen Andrew MD   glipiZIDE (GLUCOTROL) 10 MG tablet TAKE 1 TABLET BY MOUTH TWICE DAILY 3/15/21   Karen Andrew MD   canagliflozin [facility]) 100 MG TABS tablet Take 1 tablet by mouth every morning (before breakfast) 3/15/21   Karen Andrew MD   amLODIPine (NORVASC) 5 MG tablet Take 1 tablet by mouth nightly 3/15/21   Karen Andrew MD   gabapentin (NEURONTIN) 600 MG tablet Take 1 tablet by mouth 2 times daily for 30 days.  3/15/21 4/14/21  Karen Andrew MD   ibuprofen (ADVIL;MOTRIN) 800 MG tablet Take 1 tablet by mouth every 8 hours as needed for Pain 20   Karen Andrew MD   ipratropium-albuterol (DUONEB) 0.5-2.5 (3) MG/3ML SOLN nebulizer solution Inhale 1 vial into the lungs every 4 hours as needed     Historical Provider, MD   glucose monitoring kit (FREESTYLE) monitoring kit DX: Diabetes  Dispense store brand or whatever brand insurance will cover. Glucometer, lancets and testing strips for tid testing 30 days 4/29/16   JEVON Graff       Past Medical History:  Past Medical History:   Diagnosis Date    Benign essential HTN 7/10/2020    HTN (hypertension)     Hyperlipidemia 7/10/2020    Pneumonia     Type 2 diabetes mellitus ([address] 5/2016       Past Surgical History:  Past Surgical History:   Procedure Laterality Date    ARM SURGERY Left     fracutre arm    KIDNEY STONE SURGERY         Social History:  Social History     Tobacco Use    Smoking status: Never Smoker    Smokeless tobacco: Never Used   Substance Use Topics    Alcohol use: Never     Alcohol/week: 0.0 standard drinks     Frequency: Never    Drug use: Not on file       Vital Signs:   Vitals:    05/06/21 0729   BP: 128/66   Pulse: 93   Resp: 20   Temp: 99.3 °F (37.4 °C)   SpO2: 93%        Physical Exam:  Cardiac:  [x]WNL  []Comments:  Pulmonary:  [x]WNL   []Comments:  Neuro/Mental Status:  [x]WNL  []Comments:  Abdominal:  [x]WNL    []Comments:  Other:   []WNL  []Comments:    Informed Consent:  The risks and benefits of the procedure have been discussed with either the patient or if they cannot consent, their representative. Assessment:  Patient examined and appropriate for planned sedation and procedure. Plan:  Proceed with planned sedation and procedure as above. Lona Stewart am scribing for and in the presence of Dr. Amy Mayfield MD.  Electronically signed by Romero Cao RN on 5/6/2021 at 7:56 AM    I personally performed the services described in this documentation as scribed by Tyler Diaz, and it appears accurate and complete.      Amy Mayfield MD  5/6/2021

## 2021-05-06 NOTE — ANESTHESIA PRE PROCEDURE
Department of Anesthesiology  Preprocedure Note       Name:  Bhavna Tejeda   Age:  78 y.o.  :  1955                                          MRN:  [id number]         Date:  2021      Surgeon: Carmelina Brown):  Kirsten Jesus MD    Procedure: Procedure(s):  COLORECTAL CANCER SCREENING, NOT HIGH RISK    Medications prior to admission:   Prior to Admission medications    Medication Sig Start Date End Date Taking? Authorizing Provider   lisinopril-hydroCHLOROthiazide (PRINZIDE;ZESTORETIC) 20-25 MG per tablet Take 1 tablet by mouth once daily 21   Russel Padilla MD   rosuvastatin (CRESTOR) 10 MG tablet Take 1 tablet by mouth daily 3/15/21   Russel Padilla MD   sertraline (ZOLOFT) 50 MG tablet Take 1 tablet by mouth daily 3/15/21   Russel Padilal MD   glipiZIDE (GLUCOTROL) 10 MG tablet TAKE 1 TABLET BY MOUTH TWICE DAILY 3/15/21   Russel Padilla MD   canagliflozin [facility]) 100 MG TABS tablet Take 1 tablet by mouth every morning (before breakfast) 3/15/21   Russel Padilla MD   amLODIPine (NORVASC) 5 MG tablet Take 1 tablet by mouth nightly 3/15/21   Russel Padilla MD   gabapentin (NEURONTIN) 600 MG tablet Take 1 tablet by mouth 2 times daily for 30 days. 3/15/21 4/14/21  Russel Padilla MD   ibuprofen (ADVIL;MOTRIN) 800 MG tablet Take 1 tablet by mouth every 8 hours as needed for Pain 20   Russel Padilla MD   ipratropium-albuterol (DUONEB) 0.5-2.5 (3) MG/3ML SOLN nebulizer solution Inhale 1 vial into the lungs every 4 hours as needed     Historical Provider, MD   glucose monitoring kit (FREESTYLE) monitoring kit DX: Diabetes  Dispense store brand or whatever brand insurance will cover.   Glucometer, lancets and testing strips for tid testing 30 days 16   JEVON Lucas       Current medications:    Current Facility-Administered Medications   Medication Dose Route Frequency Provider Last Rate Last Admin    0.9 % sodium chloride infusion   Intravenous Continuous Kirsten Jesus  mL/hr at 21 0734 New Bag at 05/06/21 0734       Allergies: Allergies   Allergen Reactions    Aspirin     Penicillins        Problem List:    Patient Active Problem List   Diagnosis Code    Type 2 diabetes mellitus without complication (HCC) U18.0    Benign essential HTN I10    Hyperlipidemia E78.5       Past Medical History:        Diagnosis Date    Benign essential HTN 7/10/2020    HTN (hypertension)     Hyperlipidemia 7/10/2020    Pneumonia     Type 2 diabetes mellitus ([facility] [address] 5/2016       Past Surgical History:        Procedure Laterality Date    ARM SURGERY Left     fracutre arm    KIDNEY STONE SURGERY         Social History:    Social History     Tobacco Use    Smoking status: Never Smoker    Smokeless tobacco: Never Used   Substance Use Topics    Alcohol use: Never     Alcohol/week: 0.0 standard drinks     Frequency: Never                                Counseling given: Not Answered      Vital Signs (Current):   Vitals:    05/06/21 0729   BP: 128/66   Pulse: 93   Resp: 20   Temp: 99.3 °F (37.4 °C)   TempSrc: Temporal   SpO2: 93%   Weight: 285 lb (129.3 kg)   Height: 6' 1\" (1.854 m)                                              BP Readings from Last 3 Encounters:   05/06/21 128/66   03/17/21 126/76   03/15/21 128/76       NPO Status: Time of last liquid consumption: 0430                        Time of last solid consumption: 1900                        Date of last liquid consumption: 05/06/21                        Date of last solid food consumption: 05/04/21    BMI:   Wt Readings from Last 3 Encounters:   05/06/21 285 lb (129.3 kg)   03/17/21 286 lb 9.6 oz (130 kg)   03/15/21 284 lb 9.6 oz (129.1 kg)     Body mass index is 37.6 kg/m².     CBC:   Lab Results   Component Value Date    WBC 7.7 03/15/2021    RBC 5.58 03/15/2021    HGB 15.6 03/15/2021    HCT 49.6 03/15/2021    MCV 88.9 03/15/2021    RDW 14.3 03/15/2021     03/15/2021       CMP:   Lab Results   Component Value Date     03/15/2021    K 4.2 03/15/2021     03/15/2021    CO2 28 03/15/2021    BUN 25 03/15/2021    CREATININE 0.9 03/15/2021    GFRAA >59 03/15/2021    LABGLOM >60 03/15/2021    GLUCOSE 133 03/15/2021    PROT 7.7 03/15/2021    CALCIUM 9.3 03/15/2021    BILITOT 0.3 03/15/2021    ALKPHOS 76 03/15/2021    AST 15 03/15/2021    ALT 20 03/15/2021       POC Tests: No results for input(s): POCGLU, POCNA, POCK, POCCL, POCBUN, POCHEMO, POCHCT in the last 72 hours. Coags: No results found for: PROTIME, INR, APTT    HCG (If Applicable): No results found for: PREGTESTUR, PREGSERUM, HCG, HCGQUANT     ABGs: No results found for: PHART, PO2ART, DEM6IQT, RKC7UJG, BEART, E1RLLCPT     Type & Screen (If Applicable):  No results found for: LABABO, LABRH    Drug/Infectious Status (If Applicable):  No results found for: HIV, HEPCAB    COVID-19 Screening (If Applicable):   Lab Results   Component Value Date    COVID19 Not Detected 05/03/2021           Anesthesia Evaluation  Patient summary reviewed  Airway: Mallampati: II  TM distance: >3 FB   Neck ROM: full  Mouth opening: < 3 FB Dental: normal exam         Pulmonary:normal exam                              ROS comment: On neb txs   Cardiovascular:    (+) hypertension:, hyperlipidemia         Beta Blocker:  Not on Beta Blocker         Neuro/Psych:   (+) depression/anxiety             GI/Hepatic/Renal:   (+) bowel prep,          ROS comment: BMI 38  Positive cologuard. Endo/Other:    (+) DiabetesType II DM, , .                 Abdominal:           Vascular: negative vascular ROS. Anesthesia Plan      general and TIVA     ASA 3       Induction: intravenous. Anesthetic plan and risks discussed with patient.                       JEVON Lewis - CRNA   5/6/2021

## 2021-06-25 ENCOUNTER — OFFICE VISIT (OUTPATIENT)
Dept: FAMILY MEDICINE CLINIC | Age: 66
End: 2021-06-25
Payer: MEDICARE

## 2021-06-25 VITALS
HEIGHT: 73 IN | HEART RATE: 88 BPM | BODY MASS INDEX: 36.31 KG/M2 | TEMPERATURE: 97.9 F | SYSTOLIC BLOOD PRESSURE: 138 MMHG | DIASTOLIC BLOOD PRESSURE: 42 MMHG | OXYGEN SATURATION: 96 % | WEIGHT: 274 LBS

## 2021-06-25 DIAGNOSIS — E11.40 TYPE 2 DIABETES MELLITUS WITH DIABETIC NEUROPATHY, WITHOUT LONG-TERM CURRENT USE OF INSULIN (HCC): Primary | ICD-10-CM

## 2021-06-25 DIAGNOSIS — E78.5 HYPERLIPIDEMIA, UNSPECIFIED HYPERLIPIDEMIA TYPE: ICD-10-CM

## 2021-06-25 DIAGNOSIS — F41.9 ANXIETY AND DEPRESSION: ICD-10-CM

## 2021-06-25 DIAGNOSIS — F32.A ANXIETY AND DEPRESSION: ICD-10-CM

## 2021-06-25 DIAGNOSIS — I10 BENIGN ESSENTIAL HTN: ICD-10-CM

## 2021-06-25 DIAGNOSIS — E11.40 TYPE 2 DIABETES MELLITUS WITH DIABETIC NEUROPATHY, WITHOUT LONG-TERM CURRENT USE OF INSULIN (HCC): ICD-10-CM

## 2021-06-25 LAB
ALBUMIN SERPL-MCNC: 4 G/DL (ref 3.5–5.2)
ALP BLD-CCNC: 74 U/L (ref 40–130)
ALT SERPL-CCNC: 15 U/L (ref 5–41)
ANION GAP SERPL CALCULATED.3IONS-SCNC: 13 MMOL/L (ref 7–19)
AST SERPL-CCNC: 15 U/L (ref 5–40)
BILIRUB SERPL-MCNC: 0.4 MG/DL (ref 0.2–1.2)
BILIRUBIN URINE: NEGATIVE
BLOOD, URINE: NEGATIVE
BUN BLDV-MCNC: 31 MG/DL (ref 8–23)
CALCIUM SERPL-MCNC: 9 MG/DL (ref 8.8–10.2)
CHLORIDE BLD-SCNC: 103 MMOL/L (ref 98–111)
CHOLESTEROL, TOTAL: 109 MG/DL (ref 160–199)
CLARITY: CLEAR
CO2: 23 MMOL/L (ref 22–29)
COLOR: YELLOW
CREAT SERPL-MCNC: 1 MG/DL (ref 0.5–1.2)
GFR AFRICAN AMERICAN: >59
GFR NON-AFRICAN AMERICAN: >60
GLUCOSE BLD-MCNC: 114 MG/DL (ref 74–109)
GLUCOSE URINE: =>1000 MG/DL
HBA1C MFR BLD: 8.2 % (ref 4–6)
HCT VFR BLD CALC: 51 % (ref 42–52)
HDLC SERPL-MCNC: 36 MG/DL (ref 55–121)
HEMOGLOBIN: 15.9 G/DL (ref 14–18)
KETONES, URINE: ABNORMAL MG/DL
LDL CHOLESTEROL CALCULATED: 47 MG/DL
LEUKOCYTE ESTERASE, URINE: NEGATIVE
MCH RBC QN AUTO: 28.4 PG (ref 27–31)
MCHC RBC AUTO-ENTMCNC: 31.2 G/DL (ref 33–37)
MCV RBC AUTO: 91.1 FL (ref 80–94)
MICROALBUMIN UR-MCNC: <1.2 MG/DL (ref 0–19)
NITRITE, URINE: NEGATIVE
PDW BLD-RTO: 14.6 % (ref 11.5–14.5)
PH UA: 5.5 (ref 5–8)
PLATELET # BLD: 214 K/UL (ref 130–400)
PMV BLD AUTO: 10.4 FL (ref 9.4–12.4)
POTASSIUM SERPL-SCNC: 4.5 MMOL/L (ref 3.5–5)
PROTEIN UA: NEGATIVE MG/DL
RBC # BLD: 5.6 M/UL (ref 4.7–6.1)
SODIUM BLD-SCNC: 139 MMOL/L (ref 136–145)
SPECIFIC GRAVITY UA: 1.03 (ref 1–1.03)
TOTAL PROTEIN: 7.6 G/DL (ref 6.6–8.7)
TRIGL SERPL-MCNC: 132 MG/DL (ref 0–149)
UROBILINOGEN, URINE: 1 E.U./DL
WBC # BLD: 9.1 K/UL (ref 4.8–10.8)

## 2021-06-25 PROCEDURE — 2022F DILAT RTA XM EVC RTNOPTHY: CPT | Performed by: FAMILY MEDICINE

## 2021-06-25 PROCEDURE — G8417 CALC BMI ABV UP PARAM F/U: HCPCS | Performed by: FAMILY MEDICINE

## 2021-06-25 PROCEDURE — 99214 OFFICE O/P EST MOD 30 MIN: CPT | Performed by: FAMILY MEDICINE

## 2021-06-25 PROCEDURE — 4040F PNEUMOC VAC/ADMIN/RCVD: CPT | Performed by: FAMILY MEDICINE

## 2021-06-25 PROCEDURE — 3046F HEMOGLOBIN A1C LEVEL >9.0%: CPT | Performed by: FAMILY MEDICINE

## 2021-06-25 PROCEDURE — 1123F ACP DISCUSS/DSCN MKR DOCD: CPT | Performed by: FAMILY MEDICINE

## 2021-06-25 PROCEDURE — 3017F COLORECTAL CA SCREEN DOC REV: CPT | Performed by: FAMILY MEDICINE

## 2021-06-25 PROCEDURE — G8427 DOCREV CUR MEDS BY ELIG CLIN: HCPCS | Performed by: FAMILY MEDICINE

## 2021-06-25 PROCEDURE — 1036F TOBACCO NON-USER: CPT | Performed by: FAMILY MEDICINE

## 2021-06-25 RX ORDER — SERTRALINE HYDROCHLORIDE 100 MG/1
100 TABLET, FILM COATED ORAL DAILY
Qty: 90 TABLET | Refills: 3 | Status: SHIPPED | OUTPATIENT
Start: 2021-06-25 | End: 2022-03-21 | Stop reason: SDUPTHER

## 2021-06-25 SDOH — ECONOMIC STABILITY: FOOD INSECURITY: WITHIN THE PAST 12 MONTHS, YOU WORRIED THAT YOUR FOOD WOULD RUN OUT BEFORE YOU GOT MONEY TO BUY MORE.: NEVER TRUE

## 2021-06-25 SDOH — ECONOMIC STABILITY: FOOD INSECURITY: WITHIN THE PAST 12 MONTHS, THE FOOD YOU BOUGHT JUST DIDN'T LAST AND YOU DIDN'T HAVE MONEY TO GET MORE.: NEVER TRUE

## 2021-06-25 ASSESSMENT — ENCOUNTER SYMPTOMS
ALLERGIC/IMMUNOLOGIC NEGATIVE: 1
EYES NEGATIVE: 1
RESPIRATORY NEGATIVE: 1
GASTROINTESTINAL NEGATIVE: 1

## 2021-06-25 ASSESSMENT — SOCIAL DETERMINANTS OF HEALTH (SDOH): HOW HARD IS IT FOR YOU TO PAY FOR THE VERY BASICS LIKE FOOD, HOUSING, MEDICAL CARE, AND HEATING?: NOT VERY HARD

## 2021-06-25 NOTE — PROGRESS NOTES
SUBJECTIVE:    Patient ID: Lilian Alvarado is a 78 y.o.male. HPI:   Patient is a 50-year-old white male. He is here for follow-up of multiple medical problems. He have diabetes. He is on Harlon Snowman and Glucotrol. He have not had blood work since March. He is due for blood work. He is fasting for blood work today. Today blood sugar was in the 140s. He also have hypertension. Take blood pressure medicine. Blood pressures well controlled. Denies medication side effect. He also on cholesterol medication. Denies medication side effect. Is compliant with medication. He is on high potency statin therapy. He also has anxiety. Takes Zoloft 50 mg. He has been more irritable lately. He wondered he can increase the medication dose. Past Medical History:   Diagnosis Date    Benign essential HTN 7/10/2020    HTN (hypertension)     Hyperlipidemia 7/10/2020    Pneumonia     Type 2 diabetes mellitus ([facility] [address] 5/2016      Current Outpatient Medications   Medication Sig Dispense Refill    sertraline (ZOLOFT) 100 MG tablet Take 1 tablet by mouth daily 90 tablet 3    lisinopril-hydroCHLOROthiazide (PRINZIDE;ZESTORETIC) 20-25 MG per tablet Take 1 tablet by mouth once daily 90 tablet 3    rosuvastatin (CRESTOR) 10 MG tablet Take 1 tablet by mouth daily 90 tablet 3    glipiZIDE (GLUCOTROL) 10 MG tablet TAKE 1 TABLET BY MOUTH TWICE DAILY 180 tablet 2    canagliflozin (INVOKANA) 100 MG TABS tablet Take 1 tablet by mouth every morning (before breakfast) 90 tablet 3    amLODIPine (NORVASC) 5 MG tablet Take 1 tablet by mouth nightly 90 tablet 3    ibuprofen (ADVIL;MOTRIN) 800 MG tablet Take 1 tablet by mouth every 8 hours as needed for Pain 120 tablet 2    ipratropium-albuterol (DUONEB) 0.5-2.5 (3) MG/3ML SOLN nebulizer solution Inhale 1 vial into the lungs every 4 hours as needed       glucose monitoring kit (FREESTYLE) monitoring kit DX: Diabetes  Dispense store brand or whatever brand insurance will cover.   Glucometer, lancets and testing strips for tid testing 30 days 1 kit 0    gabapentin (NEURONTIN) 600 MG tablet Take 1 tablet by mouth 2 times daily for 30 days. 60 tablet 5     No current facility-administered medications for this visit. Allergies   Allergen Reactions    Aspirin     Penicillins        Review of Systems   Constitutional: Negative. HENT: Negative. Eyes: Negative. Respiratory: Negative. Cardiovascular: Negative. Gastrointestinal: Negative. Endocrine: Negative. Genitourinary: Negative. Musculoskeletal: Negative. Skin: Negative. Allergic/Immunologic: Negative. Neurological: Negative. Hematological: Negative. Psychiatric/Behavioral: Negative. OBJECTIVE:    Physical Exam  Vitals reviewed. Constitutional:       Appearance: Normal appearance. He is well-developed. He is obese. HENT:      Head: Normocephalic and atraumatic. Right Ear: Tympanic membrane, ear canal and external ear normal. There is no impacted cerumen. Left Ear: Tympanic membrane, ear canal and external ear normal. There is no impacted cerumen. Nose: Nose normal.      Mouth/Throat:      Lips: Pink. Mouth: Mucous membranes are moist.      Dentition: Normal dentition. Tongue: No lesions. Pharynx: Oropharynx is clear. Uvula midline. Tonsils: No tonsillar exudate or tonsillar abscesses. Eyes:      General: Lids are normal.         Right eye: No discharge. Left eye: No discharge. Extraocular Movements:      Right eye: Normal extraocular motion. Left eye: Normal extraocular motion. Conjunctiva/sclera: Conjunctivae normal.      Right eye: Right conjunctiva is not injected. Left eye: Left conjunctiva is not injected. Pupils: Pupils are equal, round, and reactive to light. Neck:      Thyroid: No thyromegaly. Vascular: No carotid bruit or JVD. Cardiovascular:      Rate and Rhythm: Normal rate and regular rhythm.       Pulses: Carotid pulses are 2+ on the right side and 2+ on the left side. Radial pulses are 2+ on the right side and 2+ on the left side. Heart sounds: Normal heart sounds, S1 normal and S2 normal. No murmur heard. Pulmonary:      Effort: Pulmonary effort is normal. No accessory muscle usage. Breath sounds: Normal breath sounds. Abdominal:      General: Bowel sounds are normal. There is no distension or abdominal bruit. Palpations: Abdomen is soft. There is no mass. Tenderness: There is no abdominal tenderness. Hernia: No hernia is present. Musculoskeletal:         General: Normal range of motion. Cervical back: Normal range of motion and neck supple. Right lower leg: No edema. Left lower leg: No edema. Lymphadenopathy:      Cervical:      Right cervical: No superficial cervical adenopathy. Left cervical: No superficial cervical adenopathy. Skin:     General: Skin is warm and dry. Coloration: Skin is not jaundiced or pale. Findings: No lesion or rash. Nails: There is no clubbing. Neurological:      Mental Status: He is alert and oriented to person, place, and time. Cranial Nerves: No facial asymmetry. Motor: No weakness or tremor. Coordination: Coordination normal.      Gait: Gait normal.      Deep Tendon Reflexes: Reflexes are normal and symmetric. Psychiatric:         Attention and Perception: Attention normal.         Mood and Affect: Mood normal.         Speech: Speech normal.         Behavior: Behavior normal.         Thought Content: Thought content normal.         Cognition and Memory: Memory normal.         Judgment: Judgment normal.        BP (!) 138/42 (Site: Left Upper Arm, Position: Sitting, Cuff Size: Medium Adult)   Pulse 88   Temp 97.9 °F (36.6 °C) (Temporal)   Ht 6' 1\" (1.854 m)   Wt 274 lb (124.3 kg)   SpO2 96%   BMI 36.15 kg/m²      ASSESSMENT:     Diagnosis Orders   1.  Type 2 diabetes mellitus with diabetic neuropathy, without long-term current use of insulin (HCC)need blood work suspect poor control    2. Benign essential HTNwell-controlled    3. Hyperlipidemia, unspecified hyperlipidemia typeon therapy    4. Anxiety and depressionno control sertraline (ZOLOFT) 100 MG tablet        PLAN:    1. Obtain blood work. If no improvement will consider insulin therapy. 2. Continue medication  3. Continue medication  4. Increase Zoloft to 100 mg.   Follow-up 3 months

## 2021-06-28 ENCOUNTER — TELEPHONE (OUTPATIENT)
Dept: FAMILY MEDICINE CLINIC | Age: 66
End: 2021-06-28

## 2021-06-28 DIAGNOSIS — E11.40 TYPE 2 DIABETES MELLITUS WITH DIABETIC NEUROPATHY, WITHOUT LONG-TERM CURRENT USE OF INSULIN (HCC): Primary | ICD-10-CM

## 2021-06-30 ENCOUNTER — TELEPHONE (OUTPATIENT)
Dept: FAMILY MEDICINE CLINIC | Age: 66
End: 2021-06-30

## 2021-06-30 RX ORDER — PIOGLITAZONEHYDROCHLORIDE 30 MG/1
30 TABLET ORAL DAILY
Qty: 90 TABLET | Refills: 3 | Status: SHIPPED | OUTPATIENT
Start: 2021-06-30 | End: 2022-09-23 | Stop reason: SDUPTHER

## 2021-08-13 DIAGNOSIS — G62.9 NEUROPATHY: ICD-10-CM

## 2021-08-13 DIAGNOSIS — I10 BENIGN ESSENTIAL HTN: ICD-10-CM

## 2021-08-13 RX ORDER — IBUPROFEN 800 MG/1
800 TABLET ORAL EVERY 8 HOURS PRN
Qty: 120 TABLET | Refills: 0 | Status: SHIPPED | OUTPATIENT
Start: 2021-08-13 | End: 2021-10-11

## 2021-08-13 RX ORDER — AMLODIPINE BESYLATE 5 MG/1
5 TABLET ORAL NIGHTLY
Qty: 90 TABLET | Refills: 0 | Status: SHIPPED | OUTPATIENT
Start: 2021-08-13 | End: 2021-11-09

## 2021-09-13 DIAGNOSIS — E11.40 TYPE 2 DIABETES MELLITUS WITH DIABETIC NEUROPATHY, WITHOUT LONG-TERM CURRENT USE OF INSULIN (HCC): Primary | ICD-10-CM

## 2021-09-13 DIAGNOSIS — I10 BENIGN ESSENTIAL HTN: ICD-10-CM

## 2021-09-13 DIAGNOSIS — G62.9 NEUROPATHY: ICD-10-CM

## 2021-09-13 DIAGNOSIS — G25.81 RESTLESS LEG SYNDROME: ICD-10-CM

## 2021-09-13 DIAGNOSIS — E78.5 HYPERLIPIDEMIA, UNSPECIFIED HYPERLIPIDEMIA TYPE: ICD-10-CM

## 2021-09-13 RX ORDER — GABAPENTIN 600 MG/1
600 TABLET ORAL 2 TIMES DAILY
Qty: 60 TABLET | Refills: 2 | Status: SHIPPED | OUTPATIENT
Start: 2021-09-13 | End: 2021-12-13

## 2021-10-11 DIAGNOSIS — G62.9 NEUROPATHY: ICD-10-CM

## 2021-10-11 RX ORDER — IBUPROFEN 800 MG/1
800 TABLET ORAL EVERY 8 HOURS PRN
Qty: 120 TABLET | Refills: 0 | Status: SHIPPED | OUTPATIENT
Start: 2021-10-11 | End: 2021-12-13

## 2021-11-09 DIAGNOSIS — I10 BENIGN ESSENTIAL HTN: ICD-10-CM

## 2021-11-09 RX ORDER — AMLODIPINE BESYLATE 5 MG/1
5 TABLET ORAL NIGHTLY
Qty: 90 TABLET | Refills: 1 | Status: SHIPPED | OUTPATIENT
Start: 2021-11-09 | End: 2022-03-21 | Stop reason: SDUPTHER

## 2021-12-13 DIAGNOSIS — G62.9 NEUROPATHY: ICD-10-CM

## 2021-12-13 DIAGNOSIS — G25.81 RESTLESS LEG SYNDROME: ICD-10-CM

## 2021-12-13 RX ORDER — GABAPENTIN 600 MG/1
TABLET ORAL
Qty: 60 TABLET | Refills: 2 | Status: SHIPPED | OUTPATIENT
Start: 2021-12-13 | End: 2022-04-19

## 2021-12-13 RX ORDER — IBUPROFEN 800 MG/1
800 TABLET ORAL EVERY 8 HOURS PRN
Qty: 120 TABLET | Refills: 2 | Status: SHIPPED | OUTPATIENT
Start: 2021-12-13 | End: 2022-08-15

## 2022-01-18 ENCOUNTER — PATIENT MESSAGE (OUTPATIENT)
Dept: FAMILY MEDICINE CLINIC | Age: 67
End: 2022-01-18

## 2022-01-18 RX ORDER — IPRATROPIUM BROMIDE AND ALBUTEROL SULFATE 2.5; .5 MG/3ML; MG/3ML
1 SOLUTION RESPIRATORY (INHALATION) EVERY 4 HOURS PRN
Qty: 360 ML | Refills: 1 | Status: SHIPPED | OUTPATIENT
Start: 2022-01-18

## 2022-01-18 NOTE — TELEPHONE ENCOUNTER
From: Melchor Johnson  To: Dr. Ryan Saldivar: 1/18/2022 8:47 AM CST  Subject: ipratropium-albuterol 0.5-2.5 (3) MG/3ML Soln nebulizer solution    Dr. Kelly Franco,   I received a positive Covid test results from [address] [place] [address] in [facility] on Monday 1-. I would like to refill my prescription for DuoNeb (see above in subject). Also, I was prescribed a steroid pack from [address] [place] [address]. Can DuoNeb be used in conjunction with a steroid pack treatment?   Thank you,  Melchor Johnson

## 2022-03-12 DIAGNOSIS — G25.81 RESTLESS LEG SYNDROME: ICD-10-CM

## 2022-03-12 DIAGNOSIS — G62.9 NEUROPATHY: ICD-10-CM

## 2022-03-14 RX ORDER — GABAPENTIN 600 MG/1
TABLET ORAL
Qty: 60 TABLET | Refills: 0 | OUTPATIENT
Start: 2022-03-14 | End: 2022-04-13

## 2022-03-16 DIAGNOSIS — I10 BENIGN ESSENTIAL HTN: Primary | ICD-10-CM

## 2022-03-16 DIAGNOSIS — F41.9 ANXIETY AND DEPRESSION: ICD-10-CM

## 2022-03-16 DIAGNOSIS — E78.5 HYPERLIPIDEMIA, UNSPECIFIED HYPERLIPIDEMIA TYPE: ICD-10-CM

## 2022-03-16 DIAGNOSIS — E11.40 TYPE 2 DIABETES MELLITUS WITH DIABETIC NEUROPATHY, WITHOUT LONG-TERM CURRENT USE OF INSULIN (HCC): ICD-10-CM

## 2022-03-16 DIAGNOSIS — F32.A ANXIETY AND DEPRESSION: ICD-10-CM

## 2022-03-17 DIAGNOSIS — I10 BENIGN ESSENTIAL HTN: ICD-10-CM

## 2022-03-17 DIAGNOSIS — E11.40 TYPE 2 DIABETES MELLITUS WITH DIABETIC NEUROPATHY, WITHOUT LONG-TERM CURRENT USE OF INSULIN (HCC): ICD-10-CM

## 2022-03-17 DIAGNOSIS — E78.5 HYPERLIPIDEMIA, UNSPECIFIED HYPERLIPIDEMIA TYPE: ICD-10-CM

## 2022-03-17 DIAGNOSIS — F32.A ANXIETY AND DEPRESSION: ICD-10-CM

## 2022-03-17 DIAGNOSIS — F41.9 ANXIETY AND DEPRESSION: ICD-10-CM

## 2022-03-17 LAB
ALBUMIN SERPL-MCNC: 4.6 G/DL (ref 3.5–5.2)
ALP BLD-CCNC: 56 U/L (ref 40–130)
ALT SERPL-CCNC: 13 U/L (ref 5–41)
ANION GAP SERPL CALCULATED.3IONS-SCNC: 17 MMOL/L (ref 7–19)
AST SERPL-CCNC: 17 U/L (ref 5–40)
BILIRUB SERPL-MCNC: 0.4 MG/DL (ref 0.2–1.2)
BILIRUBIN URINE: NEGATIVE
BLOOD, URINE: NEGATIVE
BUN BLDV-MCNC: 24 MG/DL (ref 8–23)
CALCIUM SERPL-MCNC: 9.8 MG/DL (ref 8.8–10.2)
CHLORIDE BLD-SCNC: 98 MMOL/L (ref 98–111)
CHOLESTEROL, TOTAL: 109 MG/DL (ref 160–199)
CLARITY: CLEAR
CO2: 26 MMOL/L (ref 22–29)
COLOR: YELLOW
CREAT SERPL-MCNC: 0.8 MG/DL (ref 0.5–1.2)
CREATININE URINE: 139.4 MG/DL (ref 4.2–622)
GFR AFRICAN AMERICAN: >59
GFR NON-AFRICAN AMERICAN: >60
GLUCOSE BLD-MCNC: 50 MG/DL (ref 74–109)
GLUCOSE URINE: NEGATIVE MG/DL
HBA1C MFR BLD: 6.2 % (ref 4–6)
HCT VFR BLD CALC: 44.9 % (ref 42–52)
HDLC SERPL-MCNC: 44 MG/DL (ref 55–121)
HEMOGLOBIN: 14.1 G/DL (ref 14–18)
KETONES, URINE: ABNORMAL MG/DL
LDL CHOLESTEROL CALCULATED: 49 MG/DL
LEUKOCYTE ESTERASE, URINE: NEGATIVE
MCH RBC QN AUTO: 29.1 PG (ref 27–31)
MCHC RBC AUTO-ENTMCNC: 31.4 G/DL (ref 33–37)
MCV RBC AUTO: 92.6 FL (ref 80–94)
MICROALBUMIN UR-MCNC: <1.2 MG/DL (ref 0–19)
MICROALBUMIN/CREAT UR-RTO: NORMAL MG/G
NITRITE, URINE: NEGATIVE
PDW BLD-RTO: 15.5 % (ref 11.5–14.5)
PH UA: 5.5 (ref 5–8)
PLATELET # BLD: 181 K/UL (ref 130–400)
PMV BLD AUTO: 10.2 FL (ref 9.4–12.4)
POTASSIUM SERPL-SCNC: 3.8 MMOL/L (ref 3.5–5)
PROTEIN UA: NEGATIVE MG/DL
RBC # BLD: 4.85 M/UL (ref 4.7–6.1)
SODIUM BLD-SCNC: 141 MMOL/L (ref 136–145)
SPECIFIC GRAVITY UA: 1.02 (ref 1–1.03)
TOTAL PROTEIN: 7.3 G/DL (ref 6.6–8.7)
TRIGL SERPL-MCNC: 78 MG/DL (ref 0–149)
TSH SERPL DL<=0.05 MIU/L-ACNC: 2.35 UIU/ML (ref 0.27–4.2)
UROBILINOGEN, URINE: 1 E.U./DL
WBC # BLD: 7.8 K/UL (ref 4.8–10.8)

## 2022-03-21 ENCOUNTER — OFFICE VISIT (OUTPATIENT)
Dept: FAMILY MEDICINE CLINIC | Age: 67
End: 2022-03-21
Payer: MEDICARE

## 2022-03-21 VITALS
BODY MASS INDEX: 39.84 KG/M2 | WEIGHT: 300.6 LBS | SYSTOLIC BLOOD PRESSURE: 136 MMHG | DIASTOLIC BLOOD PRESSURE: 72 MMHG | HEART RATE: 80 BPM | HEIGHT: 73 IN | TEMPERATURE: 98.2 F | OXYGEN SATURATION: 96 %

## 2022-03-21 DIAGNOSIS — G25.81 RESTLESS LEG SYNDROME: ICD-10-CM

## 2022-03-21 DIAGNOSIS — F41.9 ANXIETY AND DEPRESSION: ICD-10-CM

## 2022-03-21 DIAGNOSIS — F32.A ANXIETY AND DEPRESSION: ICD-10-CM

## 2022-03-21 DIAGNOSIS — I10 BENIGN ESSENTIAL HTN: ICD-10-CM

## 2022-03-21 DIAGNOSIS — E78.5 HYPERLIPIDEMIA, UNSPECIFIED HYPERLIPIDEMIA TYPE: ICD-10-CM

## 2022-03-21 DIAGNOSIS — Z11.59 NEED FOR HEPATITIS C SCREENING TEST: ICD-10-CM

## 2022-03-21 DIAGNOSIS — G62.9 NEUROPATHY: ICD-10-CM

## 2022-03-21 DIAGNOSIS — E11.40 TYPE 2 DIABETES MELLITUS WITH DIABETIC NEUROPATHY, WITHOUT LONG-TERM CURRENT USE OF INSULIN (HCC): Primary | ICD-10-CM

## 2022-03-21 PROCEDURE — 1123F ACP DISCUSS/DSCN MKR DOCD: CPT | Performed by: FAMILY MEDICINE

## 2022-03-21 PROCEDURE — 1036F TOBACCO NON-USER: CPT | Performed by: FAMILY MEDICINE

## 2022-03-21 PROCEDURE — 2022F DILAT RTA XM EVC RTNOPTHY: CPT | Performed by: FAMILY MEDICINE

## 2022-03-21 PROCEDURE — G8417 CALC BMI ABV UP PARAM F/U: HCPCS | Performed by: FAMILY MEDICINE

## 2022-03-21 PROCEDURE — 3044F HG A1C LEVEL LT 7.0%: CPT | Performed by: FAMILY MEDICINE

## 2022-03-21 PROCEDURE — 3017F COLORECTAL CA SCREEN DOC REV: CPT | Performed by: FAMILY MEDICINE

## 2022-03-21 PROCEDURE — G8427 DOCREV CUR MEDS BY ELIG CLIN: HCPCS | Performed by: FAMILY MEDICINE

## 2022-03-21 PROCEDURE — G8484 FLU IMMUNIZE NO ADMIN: HCPCS | Performed by: FAMILY MEDICINE

## 2022-03-21 PROCEDURE — 4040F PNEUMOC VAC/ADMIN/RCVD: CPT | Performed by: FAMILY MEDICINE

## 2022-03-21 PROCEDURE — 99214 OFFICE O/P EST MOD 30 MIN: CPT | Performed by: FAMILY MEDICINE

## 2022-03-21 RX ORDER — GLIPIZIDE 10 MG/1
10 TABLET ORAL
Qty: 90 TABLET | Refills: 2 | Status: SHIPPED | OUTPATIENT
Start: 2022-03-21 | End: 2022-03-23 | Stop reason: SDUPTHER

## 2022-03-21 RX ORDER — ROSUVASTATIN CALCIUM 10 MG/1
10 TABLET, COATED ORAL DAILY
Qty: 90 TABLET | Refills: 3 | Status: SHIPPED | OUTPATIENT
Start: 2022-03-21 | End: 2022-05-31

## 2022-03-21 RX ORDER — GABAPENTIN 600 MG/1
600 TABLET ORAL 2 TIMES DAILY
Qty: 60 TABLET | Refills: 2 | Status: SHIPPED | OUTPATIENT
Start: 2022-03-21 | End: 2022-06-20

## 2022-03-21 RX ORDER — LISINOPRIL AND HYDROCHLOROTHIAZIDE 25; 20 MG/1; MG/1
TABLET ORAL
Qty: 90 TABLET | Refills: 3 | Status: SHIPPED | OUTPATIENT
Start: 2022-03-21 | End: 2022-04-19

## 2022-03-21 RX ORDER — AMLODIPINE BESYLATE 5 MG/1
5 TABLET ORAL NIGHTLY
Qty: 90 TABLET | Refills: 1 | Status: SHIPPED | OUTPATIENT
Start: 2022-03-21 | End: 2022-05-16

## 2022-03-21 RX ORDER — SERTRALINE HYDROCHLORIDE 100 MG/1
100 TABLET, FILM COATED ORAL DAILY
Qty: 90 TABLET | Refills: 3 | Status: SHIPPED | OUTPATIENT
Start: 2022-03-21 | End: 2022-09-23 | Stop reason: SDUPTHER

## 2022-03-21 ASSESSMENT — PATIENT HEALTH QUESTIONNAIRE - PHQ9
1. LITTLE INTEREST OR PLEASURE IN DOING THINGS: 0
4. FEELING TIRED OR HAVING LITTLE ENERGY: 0
10. IF YOU CHECKED OFF ANY PROBLEMS, HOW DIFFICULT HAVE THESE PROBLEMS MADE IT FOR YOU TO DO YOUR WORK, TAKE CARE OF THINGS AT HOME, OR GET ALONG WITH OTHER PEOPLE: 0
SUM OF ALL RESPONSES TO PHQ9 QUESTIONS 1 & 2: 0
5. POOR APPETITE OR OVEREATING: 0
3. TROUBLE FALLING OR STAYING ASLEEP: 0
9. THOUGHTS THAT YOU WOULD BE BETTER OFF DEAD, OR OF HURTING YOURSELF: 0
SUM OF ALL RESPONSES TO PHQ QUESTIONS 1-9: 0
SUM OF ALL RESPONSES TO PHQ QUESTIONS 1-9: 0
2. FEELING DOWN, DEPRESSED OR HOPELESS: 0
SUM OF ALL RESPONSES TO PHQ QUESTIONS 1-9: 0
SUM OF ALL RESPONSES TO PHQ QUESTIONS 1-9: 0
8. MOVING OR SPEAKING SO SLOWLY THAT OTHER PEOPLE COULD HAVE NOTICED. OR THE OPPOSITE, BEING SO FIGETY OR RESTLESS THAT YOU HAVE BEEN MOVING AROUND A LOT MORE THAN USUAL: 0
6. FEELING BAD ABOUT YOURSELF - OR THAT YOU ARE A FAILURE OR HAVE LET YOURSELF OR YOUR FAMILY DOWN: 0
7. TROUBLE CONCENTRATING ON THINGS, SUCH AS READING THE NEWSPAPER OR WATCHING TELEVISION: 0

## 2022-03-21 ASSESSMENT — ENCOUNTER SYMPTOMS
EYES NEGATIVE: 1
ALLERGIC/IMMUNOLOGIC NEGATIVE: 1
GASTROINTESTINAL NEGATIVE: 1
RESPIRATORY NEGATIVE: 1

## 2022-03-21 NOTE — PROGRESS NOTES
SUBJECTIVE:    Patient ID: Raegan Hernandez is a 75 y.o.male. HPI:   Here for follow-up of multiple medical problems. Patient is a 69-year-old white male. He is diabetic. He takes oral agents only. He is compliant with therapy. Denies medication side effect. Blood sugars well controlled. She has been having some low blood sugars occasionally. He take a sulfonylurea twice a day. Blood sugars are low usually in the morning. He also have hypertension take blood pressure medicine. Blood pressure is well controlled. He is on high potency statin therapy. He also takes Neurontin for neuropathy and presents leg. Medication is helpful. Denies medication side effect. He have anxiety and depression. Takes Zoloft. Medication is helpful. No suicidal homicidal.       Past Medical History:   Diagnosis Date    Benign essential HTN 7/10/2020    HTN (hypertension)     Hyperlipidemia 7/10/2020    Pneumonia     Type 2 diabetes mellitus ([facility] [address] 5/2016      Current Outpatient Medications   Medication Sig Dispense Refill    gabapentin (NEURONTIN) 600 MG tablet Take 1 tablet by mouth 2 times daily for 180 days.  60 tablet 2    amLODIPine (NORVASC) 5 MG tablet Take 1 tablet by mouth nightly 90 tablet 1    lisinopril-hydroCHLOROthiazide (PRINZIDE;ZESTORETIC) 20-25 MG per tablet Take 1 tablet by mouth once daily 90 tablet 3    sertraline (ZOLOFT) 100 MG tablet Take 1 tablet by mouth daily 90 tablet 3    rosuvastatin (CRESTOR) 10 MG tablet Take 1 tablet by mouth daily 90 tablet 3    glipiZIDE (GLUCOTROL) 10 MG tablet Take 1 tablet by mouth daily (with breakfast) TAKE 1 TABLET BY MOUTH TWICE DAILY 90 tablet 2    ipratropium-albuterol (DUONEB) 0.5-2.5 (3) MG/3ML SOLN nebulizer solution Inhale 3 mLs into the lungs every 4 hours as needed for Shortness of Breath or Other (wheezing) 360 mL 1    gabapentin (NEURONTIN) 600 MG tablet Take 1 tablet by mouth twice daily 60 tablet 2    ibuprofen (ADVIL;MOTRIN) 800 MG tablet TAKE 1 TABLET BY MOUTH EVERY 8 HOURS AS NEEDED FOR PAIN 120 tablet 2    pioglitazone (ACTOS) 30 MG tablet Take 1 tablet by mouth daily 90 tablet 3    glucose monitoring kit (FREESTYLE) monitoring kit DX: Diabetes  Dispense store brand or whatever brand insurance will cover. Glucometer, lancets and testing strips for tid testing 30 days 1 kit 0     No current facility-administered medications for this visit. Allergies   Allergen Reactions    Aspirin     Penicillins        Review of Systems   Constitutional: Negative. HENT: Negative. Eyes: Negative. Respiratory: Negative. Cardiovascular: Negative. Gastrointestinal: Negative. Endocrine: Negative. Genitourinary: Negative. Musculoskeletal: Negative. Skin: Negative. Allergic/Immunologic: Negative. Neurological: Negative. Hematological: Negative. Psychiatric/Behavioral: Negative. OBJECTIVE:    Physical Exam  Vitals reviewed. Constitutional:       Appearance: Normal appearance. He is well-developed. He is obese. HENT:      Head: Normocephalic and atraumatic. Right Ear: Tympanic membrane, ear canal and external ear normal. There is no impacted cerumen. Left Ear: Tympanic membrane, ear canal and external ear normal. There is no impacted cerumen. Nose: Nose normal.      Mouth/Throat:      Lips: Pink. Mouth: Mucous membranes are moist.      Dentition: Normal dentition. Tongue: No lesions. Pharynx: Oropharynx is clear. Uvula midline. Tonsils: No tonsillar exudate or tonsillar abscesses. Eyes:      General: Lids are normal.         Right eye: No discharge. Left eye: No discharge. Extraocular Movements:      Right eye: Normal extraocular motion. Left eye: Normal extraocular motion. Conjunctiva/sclera: Conjunctivae normal.      Right eye: Right conjunctiva is not injected. Left eye: Left conjunctiva is not injected.       Pupils: Pupils are equal, round, and reactive to light. Neck:      Thyroid: No thyromegaly. Vascular: No carotid bruit or JVD. Cardiovascular:      Rate and Rhythm: Normal rate and regular rhythm. Pulses:           Carotid pulses are 2+ on the right side and 2+ on the left side. Radial pulses are 2+ on the right side and 2+ on the left side. Heart sounds: Normal heart sounds, S1 normal and S2 normal. No murmur heard. Pulmonary:      Effort: Pulmonary effort is normal. No accessory muscle usage. Breath sounds: Normal breath sounds. Abdominal:      General: Bowel sounds are normal. There is no distension or abdominal bruit. Palpations: Abdomen is soft. There is no mass. Tenderness: There is no abdominal tenderness. Hernia: No hernia is present. Musculoskeletal:         General: Normal range of motion. Cervical back: Normal range of motion and neck supple. Right lower leg: No edema. Left lower leg: No edema. Lymphadenopathy:      Cervical:      Right cervical: No superficial cervical adenopathy. Left cervical: No superficial cervical adenopathy. Skin:     General: Skin is warm and dry. Coloration: Skin is not jaundiced or pale. Findings: No lesion or rash. Nails: There is no clubbing. Neurological:      Mental Status: He is alert and oriented to person, place, and time. Cranial Nerves: No facial asymmetry. Motor: No weakness or tremor. Coordination: Coordination normal.      Gait: Gait normal.      Deep Tendon Reflexes: Reflexes are normal and symmetric. Psychiatric:         Attention and Perception: Attention normal.         Mood and Affect: Mood normal.         Speech: Speech normal.         Behavior: Behavior normal.         Thought Content:  Thought content normal.         Cognition and Memory: Memory normal.         Judgment: Judgment normal.        /72 (Site: Left Upper Arm, Position: Sitting, Cuff Size: Medium Adult)   Pulse 80 Temp 98.2 °F (36.8 °C) (Temporal)   Ht 6' 1\" (1.854 m)   Wt (!) 300 lb 9.6 oz (136.4 kg)   SpO2 96%   BMI 39.66 kg/m²      ASSESSMENT:     Diagnosis Orders   1. Type 2 diabetes mellitus with diabetic neuropathy, without long-term current use of insulin (HCC)-well-controlled having occasional hypoglycemia glipiZIDE (GLUCOTROL) 10 MG tablet    Lipid Panel    Hemoglobin A1C    Comprehensive Metabolic Panel    CBC    Urinalysis    MICROALBUMIN, RANDOM URINE (W/O CREATININE)   2. Benign essential HTN-well-controlled amLODIPine (NORVASC) 5 MG tablet    lisinopril-hydroCHLOROthiazide (PRINZIDE;ZESTORETIC) 20-25 MG per tablet   3. Hyperlipidemia, unspecified hyperlipidemia type-stable rosuvastatin (CRESTOR) 10 MG tablet   4. Neuropathy-stable gabapentin (NEURONTIN) 600 MG tablet   5. Restless leg syndrome-stable gabapentin (NEURONTIN) 600 MG tablet   6. Anxiety and depression-stable sertraline (ZOLOFT) 100 MG tablet   7. Need for hepatitis C screening test  Hepatitis C Antibody        PLAN:    1. Monofilament test.  Blood were discussed with patient. Will decrease glipizide to once a day. Repeat blood work in 3 months. 2.  Continue medications  3. Continue medication  4/5. Massimo Muñoz and continue medication  6. Continue medication  7.   Blood work with next blood work  Follow-up in 3 months with fasting blood work

## 2022-03-23 DIAGNOSIS — E11.40 TYPE 2 DIABETES MELLITUS WITH DIABETIC NEUROPATHY, WITHOUT LONG-TERM CURRENT USE OF INSULIN (HCC): ICD-10-CM

## 2022-03-23 PROCEDURE — 99999 PR OFFICE/OUTPT VISIT,PROCEDURE ONLY: CPT | Performed by: FAMILY MEDICINE

## 2022-03-23 RX ORDER — GLIPIZIDE 10 MG/1
10 TABLET ORAL
Qty: 90 TABLET | Refills: 2 | Status: SHIPPED | OUTPATIENT
Start: 2022-03-23 | End: 2022-07-21 | Stop reason: SDUPTHER

## 2022-04-16 SDOH — HEALTH STABILITY: PHYSICAL HEALTH: ON AVERAGE, HOW MANY DAYS PER WEEK DO YOU ENGAGE IN MODERATE TO STRENUOUS EXERCISE (LIKE A BRISK WALK)?: 5 DAYS

## 2022-04-16 SDOH — HEALTH STABILITY: PHYSICAL HEALTH: ON AVERAGE, HOW MANY MINUTES DO YOU ENGAGE IN EXERCISE AT THIS LEVEL?: 20 MIN

## 2022-04-16 ASSESSMENT — PATIENT HEALTH QUESTIONNAIRE - PHQ9
SUM OF ALL RESPONSES TO PHQ QUESTIONS 1-9: 0
SUM OF ALL RESPONSES TO PHQ9 QUESTIONS 1 & 2: 0
2. FEELING DOWN, DEPRESSED OR HOPELESS: 0
SUM OF ALL RESPONSES TO PHQ QUESTIONS 1-9: 0
1. LITTLE INTEREST OR PLEASURE IN DOING THINGS: 0

## 2022-04-16 ASSESSMENT — LIFESTYLE VARIABLES
HOW MANY STANDARD DRINKS CONTAINING ALCOHOL DO YOU HAVE ON A TYPICAL DAY: PATIENT DECLINED
HOW OFTEN DO YOU HAVE SIX OR MORE DRINKS ON ONE OCCASION: 1
HOW MANY STANDARD DRINKS CONTAINING ALCOHOL DO YOU HAVE ON A TYPICAL DAY: 98
HOW OFTEN DO YOU HAVE A DRINK CONTAINING ALCOHOL: 1
HOW OFTEN DO YOU HAVE A DRINK CONTAINING ALCOHOL: NEVER

## 2022-04-19 ENCOUNTER — TELEMEDICINE (OUTPATIENT)
Dept: FAMILY MEDICINE CLINIC | Age: 67
End: 2022-04-19
Payer: MEDICARE

## 2022-04-19 DIAGNOSIS — Z00.00 INITIAL MEDICARE ANNUAL WELLNESS VISIT: Primary | ICD-10-CM

## 2022-04-19 DIAGNOSIS — I10 BENIGN ESSENTIAL HTN: ICD-10-CM

## 2022-04-19 PROBLEM — H25.13 AGE-RELATED NUCLEAR CATARACT, BILATERAL: Status: ACTIVE | Noted: 2019-06-27

## 2022-04-19 PROBLEM — H52.4 PRESBYOPIA: Status: ACTIVE | Noted: 2019-06-27

## 2022-04-19 PROBLEM — H04.123 DRY EYE SYNDROME OF BILATERAL LACRIMAL GLANDS: Status: ACTIVE | Noted: 2019-06-27

## 2022-04-19 PROCEDURE — 3017F COLORECTAL CA SCREEN DOC REV: CPT | Performed by: FAMILY MEDICINE

## 2022-04-19 PROCEDURE — G0438 PPPS, INITIAL VISIT: HCPCS | Performed by: FAMILY MEDICINE

## 2022-04-19 PROCEDURE — 4040F PNEUMOC VAC/ADMIN/RCVD: CPT | Performed by: FAMILY MEDICINE

## 2022-04-19 PROCEDURE — 1123F ACP DISCUSS/DSCN MKR DOCD: CPT | Performed by: FAMILY MEDICINE

## 2022-04-19 ASSESSMENT — PATIENT HEALTH QUESTIONNAIRE - PHQ9
8. MOVING OR SPEAKING SO SLOWLY THAT OTHER PEOPLE COULD HAVE NOTICED. OR THE OPPOSITE, BEING SO FIGETY OR RESTLESS THAT YOU HAVE BEEN MOVING AROUND A LOT MORE THAN USUAL: 0
3. TROUBLE FALLING OR STAYING ASLEEP: 0
SUM OF ALL RESPONSES TO PHQ QUESTIONS 1-9: 0
SUM OF ALL RESPONSES TO PHQ QUESTIONS 1-9: 0
2. FEELING DOWN, DEPRESSED OR HOPELESS: 0
1. LITTLE INTEREST OR PLEASURE IN DOING THINGS: 0
SUM OF ALL RESPONSES TO PHQ QUESTIONS 1-9: 0
9. THOUGHTS THAT YOU WOULD BE BETTER OFF DEAD, OR OF HURTING YOURSELF: 0
SUM OF ALL RESPONSES TO PHQ QUESTIONS 1-9: 0
SUM OF ALL RESPONSES TO PHQ9 QUESTIONS 1 & 2: 0
SUM OF ALL RESPONSES TO PHQ9 QUESTIONS 1 & 2: 0
5. POOR APPETITE OR OVEREATING: 0
SUM OF ALL RESPONSES TO PHQ QUESTIONS 1-9: 0
6. FEELING BAD ABOUT YOURSELF - OR THAT YOU ARE A FAILURE OR HAVE LET YOURSELF OR YOUR FAMILY DOWN: 0
2. FEELING DOWN, DEPRESSED OR HOPELESS: 0
4. FEELING TIRED OR HAVING LITTLE ENERGY: 0
7. TROUBLE CONCENTRATING ON THINGS, SUCH AS READING THE NEWSPAPER OR WATCHING TELEVISION: 0
SUM OF ALL RESPONSES TO PHQ QUESTIONS 1-9: 0
SUM OF ALL RESPONSES TO PHQ QUESTIONS 1-9: 0
1. LITTLE INTEREST OR PLEASURE IN DOING THINGS: 0
SUM OF ALL RESPONSES TO PHQ QUESTIONS 1-9: 0

## 2022-04-19 NOTE — PROGRESS NOTES
Medicare Annual Wellness Visit    Sumeet Astorga is called by phone for Medicare [place]   Initial Medicare annual wellness visit      Recommendations for Preventive Services Due: see orders and patient instructions/AVS.  Recommended screening schedule for the next 5-10 years is provided to the patient in written form: see Patient Instructions/AVS.     No follow-ups on file. Flora Simmonds is doing well. He is active with family and friends. He exercises 5 days per week. Patient's complete Health Risk Assessment and screening values have been reviewed and are found in Flowsheets. The following problems were reviewed today and where indicated follow up appointments were made and/or referrals ordered.     Positive Risk Factor Screenings with Interventions:             General Health and ACP:  General  In general, how would you say your health is?: Very Good  In the past 7 days, have you experienced any of the following: New or Increased Pain, New or Increased Fatigue, Loneliness, Social Isolation, Stress or Anger?: No  Do you get the social and emotional support that you need?: Yes  Do you have a Living Will?: (!) No    Advance Directives     Power of  Living Will ACP-Advance Directive ACP-Power of     Not on File Not on File Not on File Not on File      General Health Risk Interventions:  · No Living Will: Advance Care Planning addressed with patient today    Health Habits/Nutrition:     Physical Activity: Insufficiently Active    Days of Exercise per Week: 5 days    Minutes of Exercise per Session: 20 min     Have you lost any weight without trying in the past 3 months?: No     Have you seen the dentist within the past year?: (!) No    Health Habits/Nutrition Interventions:  · Dental exam overdue:  patient encouraged to make appointment with his/her dentist    Hearing/Vision:  Do you or your family notice any trouble with your hearing that hasn't been managed with hearing aids?: (!) Yes  Do you have difficulty driving, watching TV, or doing any of your daily activities because of your eyesight?: (!) Yes  Have you had an eye exam within the past year?: (!) No  No exam data present    Hearing/Vision Interventions:  · Hearing concerns:  patient declines any further evaluation/treatment for hearing issues, Pt answered the question incorrectly. He denies any hearing deficit at the visit. He answered the questions on line prior to the appointment. · Vision concerns:  patient encouraged to make appointment with his/her eye specialist            Objective      Patient-Reported Vitals  No data recorded     Recent memory is intact. Remote memory is not tested due to the VV per phone. Allergies   Allergen Reactions    Latex     Aspirin     Penicillins      Prior to Visit Medications    Medication Sig Taking? Authorizing Provider   glipiZIDE (GLUCOTROL) 10 MG tablet Take 1 tablet by mouth daily (with breakfast) Yes Gianfranco Melendez MD   gabapentin (NEURONTIN) 600 MG tablet Take 1 tablet by mouth 2 times daily for 180 days.  Yes Gianfranco Melendez MD   amLODIPine (NORVASC) 5 MG tablet Take 1 tablet by mouth nightly Yes Gianfranco Melendez MD   lisinopril-hydroCHLOROthiazide (PRINZIDE;ZESTORETIC) 20-25 MG per tablet Take 1 tablet by mouth once daily Yes Gianfranco Melendez MD   sertraline (ZOLOFT) 100 MG tablet Take 1 tablet by mouth daily Yes Gianfranco Melendez MD   rosuvastatin (CRESTOR) 10 MG tablet Take 1 tablet by mouth daily Yes Gianfranco Melendez MD   ipratropium-albuterol (DUONEB) 0.5-2.5 (3) MG/3ML SOLN nebulizer solution Inhale 3 mLs into the lungs every 4 hours as needed for Shortness of Breath or Other (wheezing) Yes Gianfranco Melendez MD   ibuprofen (ADVIL;MOTRIN) 800 MG tablet TAKE 1 TABLET BY MOUTH EVERY 8 HOURS AS NEEDED FOR PAIN Yes Gianfranco Melendez MD   pioglitazone (ACTOS) 30 MG tablet Take 1 tablet by mouth daily Yes Gianfranco Melendez MD   glucose monitoring kit (FREESTYLE) monitoring kit DX: Diabetes  Dispense store brand or whatever brand insurance will cover. Glucometer, lancets and testing strips for tid testing 30 days Yes [facility] (Including outside providers/suppliers regularly involved in providing care):   Patient Care Team:  Ranjit Montyoa MD as PCP - General (Family Medicine)  Ranjit Montoya MD as PCP - [facility]    Reviewed and updated this visit:  Allergies  Meds       Labs were done on 3/17/2022. Health Maintenance was reviewed and updated. Taylor , was evaluated through a synchronous (real-time) audio-video encounter. The patient (or guardian if applicable) is aware that this is a billable service, which includes applicable co-pays. This Virtual Visit was conducted with patient's (and/or legal guardian's) consent. The visit was conducted pursuant to the emergency declaration under the [address] [place] [address] waiver authority and the [employer] Act. Patient identification was verified, and a caregiver was present when appropriate. The patient was located at home in a state where the provider was licensed to provide care. Alta Spatz, LPN, 5/56/1678, performed the documented evaluation under the direct supervision of the attending physician.

## 2022-04-19 NOTE — PATIENT INSTRUCTIONS
Personalized Preventive Plan for Claudia Anderson - 4/19/2022  Medicare offers a range of preventive health benefits. Some of the tests and screenings are paid in full while other may be subject to a deductible, co-insurance, and/or copay. Some of these benefits include a comprehensive review of your medical history including lifestyle, illnesses that may run in your family, and various assessments and screenings as appropriate. After reviewing your medical record and screening and assessments performed today your provider may have ordered immunizations, labs, imaging, and/or referrals for you. A list of these orders (if applicable) as well as your Preventive Care list are included within your After Visit Summary for your review. Other Preventive Recommendations:    · A preventive eye exam performed by an eye specialist is recommended every 1-2 years to screen for glaucoma; cataracts, macular degeneration, and other eye disorders. · A preventive dental visit is recommended every 6 months. · Try to get at least 150 minutes of exercise per week or 10,000 steps per day on a pedometer . · Order or download the FREE \"Exercise & Physical Activity: Your Everyday Guide\" from [employer]. Call 8-938.681.8992 or search [employer] online. · You need 5628-3830 mg of calcium and 0733-6958 IU of vitamin D per day. It is possible to meet your calcium requirement with diet alone, but a vitamin D supplement is usually necessary to meet this goal.  · When exposed to the sun, use a sunscreen that protects against both UVA and UVB radiation with an SPF of 30 or greater. Reapply every 2 to 3 hours or after sweating, drying off with a towel, or swimming. · Always wear a seat belt when traveling in a car. Always wear a helmet when riding a bicycle or motorcycle. Heart-Healthy Diet   Sodium, Fat, and Cholesterol Controlled Diet       What Is a Heart Healthy Diet?    A heart-healthy diet is one that limits sodium , certain types of fat , and cholesterol . This type of diet is recommended for:   People with any form of cardiovascular disease (eg, coronary heart disease , peripheral vascular disease , previous heart attack , previous stroke )   People with risk factors for cardiovascular disease, such as high blood pressure , high cholesterol , or diabetes   Anyone who wants to lower their risk of developing cardiovascular disease   Sodium    Sodium is a mineral found in many foods. In general, most people consume much more sodium than they need. Diets high in sodium can increase blood pressure and lead to edema (water retention). On a heart-healthy diet, you should consume no more than 2,300 mg (milligrams) of sodium per dayabout the amount in one teaspoon of table salt. The foods highest in sodium include table salt (about 50% sodium), processed foods, convenience foods, and preserved foods. Cholesterol    Cholesterol is a fat-like, waxy substance in your blood. Our bodies make some cholesterol. It is also found in animal products, with the highest amounts in fatty meat, egg yolks, whole milk, cheese, shellfish, and organ meats. On a heart-healthy diet, you should limit your cholesterol intake to less than 200 mg per day. It is normal and important to have some cholesterol in your bloodstream. But too much cholesterol can cause plaque to build up within your arteries, which can eventually lead to a heart attack or stroke. The two types of cholesterol that are most commonly referred to are:   Low-density lipoprotein (LDL) cholesterol  Also known as bad cholesterol, this is the cholesterol that tends to build up along your arteries. Bad cholesterol levels are increased by eating fats that are saturated or hydrogenated. Optimal level of this cholesterol is less than 100. Over 130 starts to get risky for heart disease.    High-density lipoprotein (HDL) cholesterol  Also known as good cholesterol, this type of cholesterol actually carries cholesterol away from your arteries and may, therefore, help lower your risk of having a heart attack. You want this level to be high (ideally greater than 60). It is a risk to have a level less than 40. You can raise this good cholesterol by eating olive oil, canola oil, avocados, or nuts. Exercise raises this level, too. Fat    Fat is calorie dense and packs a lot of calories into a small amount of food. Even though fats should be limited due to their high calorie content, not all fats are bad. In fact, some fats are quite healthful. Fat can be broken down into four main types. The good-for-you fats are:   Monounsaturated fat  found in oils such as olive and canola, avocados, and nuts and natural nut butters; can decrease cholesterol levels, while keeping levels of HDL cholesterol high   Polyunsaturated fat  found in oils such as safflower, sunflower, soybean, corn, and sesame; can decrease total cholesterol and LDL cholesterol   Omega-3 fatty acids  particularly those found in fatty fish (such as salmon, trout, tuna, mackerel, herring, and sardines); can decrease risk of arrhythmias, decrease triglyceride levels, and slightly lower blood pressure   The fats that you want to limit are:   Saturated fat  found in animal products, many fast foods, and a few vegetables; increases total blood cholesterol, including LDL levels   Animal fats that are saturated include: butter, lard, whole-milk dairy products, meat fat, and poultry skin   Vegetable fats that are saturated include: hydrogenated shortening, palm oil, coconut oil, cocoa butter   Hydrogenated or trans fat  found in margarine and vegetable shortening, most shelf stable snack foods, and fried foods; increases LDL and decreases HDL     It is generally recommended that you limit your total fat for the day to less than 30% of your total calories.  If you follow an 1800-calorie heart healthy diet, for example, this would mean 60 grams of fat or less per day. Saturated fat and trans fat in your diet raises your blood cholesterol the most, much more than dietary cholesterol does. For this reason, on a heart-healthy diet, less than 7% of your calories should come from saturated fat and ideally 0% from trans fat. On an 1800-calorie diet, this translates into less than 14 grams of saturated fat per day, leaving 46 grams of fat to come from mono- and polyunsaturated fats.    Food Choices on a Heart Healthy Diet   Food Category   Foods Recommended   Foods to Avoid   Grains   Breads and rolls without salted tops Most dry and cooked cereals Unsalted crackers and breadsticks Low-sodium or homemade breadcrumbs or stuffing All rice and pastas   Breads, rolls, and crackers with salted tops High-fat baked goods (eg, muffins, donuts, pastries) Quick breads, self-rising flour, and biscuit mixes Regular bread crumbs Instant hot cereals Commercially prepared rice, pasta, or stuffing mixes   Vegetables   Most fresh, frozen, and low-sodium canned vegetables Low-sodium and salt-free vegetable juices Canned vegetables if unsalted or rinsed   Regular canned vegetables and juices, including sauerkraut and pickled vegetables Frozen vegetables with sauces Commercially prepared potato and vegetable mixes   Fruits   Most fresh, frozen, and canned fruits All fruit juices   Fruits processed with salt or sodium   Milk   Nonfat or low-fat (1%) milk Nonfat or low-fat yogurt Cottage cheese, low-fat ricotta, cheeses labeled as low-fat and low-sodium   Whole milk Reduced-fat (2%) milk Malted and chocolate milk Full fat yogurt Most cheeses (unless low-fat and low salt) Buttermilk (no more than 1 cup per week)   Meats and Beans   Lean cuts of fresh or frozen beef, veal, lamb, or pork (look for the word loin) Fresh or frozen poultry without the skin Fresh or frozen fish and some shellfish Egg whites and egg substitutes (Limit whole eggs to three per week) Tofu Nuts or seeds (unsalted, dry-roasted), low-sodium peanut butter Dried peas, beans, and lentils   Any smoked, cured, salted, or canned meat, fish, or poultry (including winslow, chipped beef, cold cuts, hot dogs, sausages, sardines, and anchovies) Poultry skins Breaded and/or fried fish or meats Canned peas, beans, and lentils Salted nuts   Fats and Oils   Olive oil and canola oil Low-sodium, low-fat salad dressings and mayonnaise   Butter, margarine, coconut and palm oils, winslow fat   Snacks, Sweets, and Condiments   Low-sodium or unsalted versions of broths, soups, soy sauce, and condiments Pepper, herbs, and spices; vinegar, lemon, or lime juice Low-fat frozen desserts (yogurt, sherbet, fruit bars) Sugar, cocoa powder, honey, syrup, jam, and preserves Low-fat, trans-fat free cookies, cakes, and pies Hong and animal crackers, fig bars, colby snaps   High-fat desserts Broth, soups, gravies, and sauces, made from instant mixes or other high-sodium ingredients Salted snack foods Canned olives Meat tenderizers, seasoning salt, and most flavored vinegars   Beverages   Low-sodium carbonated beverages Tea and coffee in moderation Soy milk   Commercially softened water   Suggestions   Make whole grains, fruits, and vegetables the base of your diet. Choose heart-healthy fats such as canola, olive, and flaxseed oil, and foods high in heart-healthy fats, such as nuts, seeds, soybeans, tofu, and fish. Eat fish at least twice per week; the fish highest in omega-3 fatty acids and lowest in mercury include salmon, herring, mackerel, sardines, and canned chunk light tuna. If you eat fish less than twice per week or have high triglycerides, talk to your doctor about taking fish oil supplements. Read food labels.    For products low in fat and cholesterol, look for fat free, low-fat, cholesterol free, saturated fat free, and trans fat freeAlso scan the Nutrition Facts Label, which lists saturated fat, trans fat, and cholesterol amounts. For products low in sodium, look for sodium free, very low sodium, low sodium, no added salt, and unsalted   Skip the salt when cooking or at the table; if food needs more flavor, get creative and try out different herbs and spices. Garlic and onion also add substantial flavor to foods. Trim any visible fat off meat and poultry before cooking, and drain the fat off after kate. Use cooking methods that require little or no added fat, such as grilling, boiling, baking, poaching, broiling, roasting, steaming, stir-frying, and sauting. Avoid fast food and convenience food. They tend to be high in saturated and trans fat and have a lot of added salt. Talk to a registered dietitian for individualized diet advice. Last Reviewed: March 2011 Yin Whitman MS, MPH, RD   Updated: 3/29/2011   ·     Heart-Healthy Diet   Sodium, Fat, and Cholesterol Controlled Diet       What Is a Heart Healthy Diet? A heart-healthy diet is one that limits sodium , certain types of fat , and cholesterol . This type of diet is recommended for:   People with any form of cardiovascular disease (eg, coronary heart disease , peripheral vascular disease , previous heart attack , previous stroke )   People with risk factors for cardiovascular disease, such as high blood pressure , high cholesterol , or diabetes   Anyone who wants to lower their risk of developing cardiovascular disease   Sodium    Sodium is a mineral found in many foods. In general, most people consume much more sodium than they need. Diets high in sodium can increase blood pressure and lead to edema (water retention). On a heart-healthy diet, you should consume no more than 2,300 mg (milligrams) of sodium per dayabout the amount in one teaspoon of table salt. The foods highest in sodium include table salt (about 50% sodium), processed foods, convenience foods, and preserved foods.    Cholesterol    Cholesterol is a fat-like, waxy substance in your blood. Our bodies make some cholesterol. It is also found in animal products, with the highest amounts in fatty meat, egg yolks, whole milk, cheese, shellfish, and organ meats. On a heart-healthy diet, you should limit your cholesterol intake to less than 200 mg per day. It is normal and important to have some cholesterol in your bloodstream. But too much cholesterol can cause plaque to build up within your arteries, which can eventually lead to a heart attack or stroke. The two types of cholesterol that are most commonly referred to are:   Low-density lipoprotein (LDL) cholesterol  Also known as bad cholesterol, this is the cholesterol that tends to build up along your arteries. Bad cholesterol levels are increased by eating fats that are saturated or hydrogenated. Optimal level of this cholesterol is less than 100. Over 130 starts to get risky for heart disease. High-density lipoprotein (HDL) cholesterol  Also known as good cholesterol, this type of cholesterol actually carries cholesterol away from your arteries and may, therefore, help lower your risk of having a heart attack. You want this level to be high (ideally greater than 60). It is a risk to have a level less than 40. You can raise this good cholesterol by eating olive oil, canola oil, avocados, or nuts. Exercise raises this level, too. Fat    Fat is calorie dense and packs a lot of calories into a small amount of food. Even though fats should be limited due to their high calorie content, not all fats are bad. In fact, some fats are quite healthful. Fat can be broken down into four main types.    The good-for-you fats are:   Monounsaturated fat  found in oils such as olive and canola, avocados, and nuts and natural nut butters; can decrease cholesterol levels, while keeping levels of HDL cholesterol high   Polyunsaturated fat  found in oils such as safflower, sunflower, soybean, corn, and sesame; can decrease total cholesterol and LDL cholesterol   Omega-3 fatty acids  particularly those found in fatty fish (such as salmon, trout, tuna, mackerel, herring, and sardines); can decrease risk of arrhythmias, decrease triglyceride levels, and slightly lower blood pressure   The fats that you want to limit are:   Saturated fat  found in animal products, many fast foods, and a few vegetables; increases total blood cholesterol, including LDL levels   Animal fats that are saturated include: butter, lard, whole-milk dairy products, meat fat, and poultry skin   Vegetable fats that are saturated include: hydrogenated shortening, palm oil, coconut oil, cocoa butter   Hydrogenated or trans fat  found in margarine and vegetable shortening, most shelf stable snack foods, and fried foods; increases LDL and decreases HDL     It is generally recommended that you limit your total fat for the day to less than 30% of your total calories. If you follow an 1800-calorie heart healthy diet, for example, this would mean 60 grams of fat or less per day. Saturated fat and trans fat in your diet raises your blood cholesterol the most, much more than dietary cholesterol does. For this reason, on a heart-healthy diet, less than 7% of your calories should come from saturated fat and ideally 0% from trans fat. On an 1800-calorie diet, this translates into less than 14 grams of saturated fat per day, leaving 46 grams of fat to come from mono- and polyunsaturated fats.    Food Choices on a Heart Healthy Diet   Food Category   Foods Recommended   Foods to Avoid   Grains   Breads and rolls without salted tops Most dry and cooked cereals Unsalted crackers and breadsticks Low-sodium or homemade breadcrumbs or stuffing All rice and pastas   Breads, rolls, and crackers with salted tops High-fat baked goods (eg, muffins, donuts, pastries) Quick breads, self-rising flour, and biscuit mixes Regular bread crumbs Instant hot cereals Commercially prepared rice, pasta, or stuffing mixes Vegetables   Most fresh, frozen, and low-sodium canned vegetables Low-sodium and salt-free vegetable juices Canned vegetables if unsalted or rinsed   Regular canned vegetables and juices, including sauerkraut and pickled vegetables Frozen vegetables with sauces Commercially prepared potato and vegetable mixes   Fruits   Most fresh, frozen, and canned fruits All fruit juices   Fruits processed with salt or sodium   Milk   Nonfat or low-fat (1%) milk Nonfat or low-fat yogurt Cottage cheese, low-fat ricotta, cheeses labeled as low-fat and low-sodium   Whole milk Reduced-fat (2%) milk Malted and chocolate milk Full fat yogurt Most cheeses (unless low-fat and low salt) Buttermilk (no more than 1 cup per week)   Meats and Beans   Lean cuts of fresh or frozen beef, veal, lamb, or pork (look for the word loin) Fresh or frozen poultry without the skin Fresh or frozen fish and some shellfish Egg whites and egg substitutes (Limit whole eggs to three per week) Tofu Nuts or seeds (unsalted, dry-roasted), low-sodium peanut butter Dried peas, beans, and lentils   Any smoked, cured, salted, or canned meat, fish, or poultry (including winslow, chipped beef, cold cuts, hot dogs, sausages, sardines, and anchovies) Poultry skins Breaded and/or fried fish or meats Canned peas, beans, and lentils Salted nuts   Fats and Oils   Olive oil and canola oil Low-sodium, low-fat salad dressings and mayonnaise   Butter, margarine, coconut and palm oils, winslow fat   Snacks, Sweets, and Condiments   Low-sodium or unsalted versions of broths, soups, soy sauce, and condiments Pepper, herbs, and spices; vinegar, lemon, or lime juice Low-fat frozen desserts (yogurt, sherbet, fruit bars) Sugar, cocoa powder, honey, syrup, jam, and preserves Low-fat, trans-fat free cookies, cakes, and pies Hong and animal crackers, fig bars, colby snaps   High-fat desserts Broth, soups, gravies, and sauces, made from instant mixes or other high-sodium ingredients Salted snack foods Canned olives Meat tenderizers, seasoning salt, and most flavored vinegars   Beverages   Low-sodium carbonated beverages Tea and coffee in moderation Soy milk   Commercially softened water   Suggestions   Make whole grains, fruits, and vegetables the base of your diet. Choose heart-healthy fats such as canola, olive, and flaxseed oil, and foods high in heart-healthy fats, such as nuts, seeds, soybeans, tofu, and fish. Eat fish at least twice per week; the fish highest in omega-3 fatty acids and lowest in mercury include salmon, herring, mackerel, sardines, and canned chunk light tuna. If you eat fish less than twice per week or have high triglycerides, talk to your doctor about taking fish oil supplements. Read food labels. For products low in fat and cholesterol, look for fat free, low-fat, cholesterol free, saturated fat free, and trans fat freeAlso scan the Nutrition Facts Label, which lists saturated fat, trans fat, and cholesterol amounts. For products low in sodium, look for sodium free, very low sodium, low sodium, no added salt, and unsalted   Skip the salt when cooking or at the table; if food needs more flavor, get creative and try out different herbs and spices. Garlic and onion also add substantial flavor to foods. Trim any visible fat off meat and poultry before cooking, and drain the fat off after kate. Use cooking methods that require little or no added fat, such as grilling, boiling, baking, poaching, broiling, roasting, steaming, stir-frying, and sauting. Avoid fast food and convenience food. They tend to be high in saturated and trans fat and have a lot of added salt. Talk to a registered dietitian for individualized diet advice. Last Reviewed: March 2011 Lasha King MS, MPH, RD   Updated: 3/29/2011   ·     Heart-Healthy Diet   Sodium, Fat, and Cholesterol Controlled Diet       What Is a Heart Healthy Diet?    A heart-healthy diet is one that limits sodium , certain types of fat , and cholesterol . This type of diet is recommended for:   People with any form of cardiovascular disease (eg, coronary heart disease , peripheral vascular disease , previous heart attack , previous stroke )   People with risk factors for cardiovascular disease, such as high blood pressure , high cholesterol , or diabetes   Anyone who wants to lower their risk of developing cardiovascular disease   Sodium    Sodium is a mineral found in many foods. In general, most people consume much more sodium than they need. Diets high in sodium can increase blood pressure and lead to edema (water retention). On a heart-healthy diet, you should consume no more than 2,300 mg (milligrams) of sodium per dayabout the amount in one teaspoon of table salt. The foods highest in sodium include table salt (about 50% sodium), processed foods, convenience foods, and preserved foods. Cholesterol    Cholesterol is a fat-like, waxy substance in your blood. Our bodies make some cholesterol. It is also found in animal products, with the highest amounts in fatty meat, egg yolks, whole milk, cheese, shellfish, and organ meats. On a heart-healthy diet, you should limit your cholesterol intake to less than 200 mg per day. It is normal and important to have some cholesterol in your bloodstream. But too much cholesterol can cause plaque to build up within your arteries, which can eventually lead to a heart attack or stroke. The two types of cholesterol that are most commonly referred to are:   Low-density lipoprotein (LDL) cholesterol  Also known as bad cholesterol, this is the cholesterol that tends to build up along your arteries. Bad cholesterol levels are increased by eating fats that are saturated or hydrogenated. Optimal level of this cholesterol is less than 100. Over 130 starts to get risky for heart disease.    High-density lipoprotein (HDL) cholesterol  Also known as good cholesterol, this type of cholesterol actually carries cholesterol away from your arteries and may, therefore, help lower your risk of having a heart attack. You want this level to be high (ideally greater than 60). It is a risk to have a level less than 40. You can raise this good cholesterol by eating olive oil, canola oil, avocados, or nuts. Exercise raises this level, too. Fat    Fat is calorie dense and packs a lot of calories into a small amount of food. Even though fats should be limited due to their high calorie content, not all fats are bad. In fact, some fats are quite healthful. Fat can be broken down into four main types. The good-for-you fats are:   Monounsaturated fat  found in oils such as olive and canola, avocados, and nuts and natural nut butters; can decrease cholesterol levels, while keeping levels of HDL cholesterol high   Polyunsaturated fat  found in oils such as safflower, sunflower, soybean, corn, and sesame; can decrease total cholesterol and LDL cholesterol   Omega-3 fatty acids  particularly those found in fatty fish (such as salmon, trout, tuna, mackerel, herring, and sardines); can decrease risk of arrhythmias, decrease triglyceride levels, and slightly lower blood pressure   The fats that you want to limit are:   Saturated fat  found in animal products, many fast foods, and a few vegetables; increases total blood cholesterol, including LDL levels   Animal fats that are saturated include: butter, lard, whole-milk dairy products, meat fat, and poultry skin   Vegetable fats that are saturated include: hydrogenated shortening, palm oil, coconut oil, cocoa butter   Hydrogenated or trans fat  found in margarine and vegetable shortening, most shelf stable snack foods, and fried foods; increases LDL and decreases HDL     It is generally recommended that you limit your total fat for the day to less than 30% of your total calories.  If you follow an 1800-calorie heart healthy diet, for example, this would mean 60 grams of fat or less per day. Saturated fat and trans fat in your diet raises your blood cholesterol the most, much more than dietary cholesterol does. For this reason, on a heart-healthy diet, less than 7% of your calories should come from saturated fat and ideally 0% from trans fat. On an 1800-calorie diet, this translates into less than 14 grams of saturated fat per day, leaving 46 grams of fat to come from mono- and polyunsaturated fats.    Food Choices on a Heart Healthy Diet   Food Category   Foods Recommended   Foods to Avoid   Grains   Breads and rolls without salted tops Most dry and cooked cereals Unsalted crackers and breadsticks Low-sodium or homemade breadcrumbs or stuffing All rice and pastas   Breads, rolls, and crackers with salted tops High-fat baked goods (eg, muffins, donuts, pastries) Quick breads, self-rising flour, and biscuit mixes Regular bread crumbs Instant hot cereals Commercially prepared rice, pasta, or stuffing mixes   Vegetables   Most fresh, frozen, and low-sodium canned vegetables Low-sodium and salt-free vegetable juices Canned vegetables if unsalted or rinsed   Regular canned vegetables and juices, including sauerkraut and pickled vegetables Frozen vegetables with sauces Commercially prepared potato and vegetable mixes   Fruits   Most fresh, frozen, and canned fruits All fruit juices   Fruits processed with salt or sodium   Milk   Nonfat or low-fat (1%) milk Nonfat or low-fat yogurt Cottage cheese, low-fat ricotta, cheeses labeled as low-fat and low-sodium   Whole milk Reduced-fat (2%) milk Malted and chocolate milk Full fat yogurt Most cheeses (unless low-fat and low salt) Buttermilk (no more than 1 cup per week)   Meats and Beans   Lean cuts of fresh or frozen beef, veal, lamb, or pork (look for the word loin) Fresh or frozen poultry without the skin Fresh or frozen fish and some shellfish Egg whites and egg substitutes (Limit whole eggs to three per week) Tofu Nuts or seeds (unsalted, dry-roasted), low-sodium peanut butter Dried peas, beans, and lentils   Any smoked, cured, salted, or canned meat, fish, or poultry (including winslow, chipped beef, cold cuts, hot dogs, sausages, sardines, and anchovies) Poultry skins Breaded and/or fried fish or meats Canned peas, beans, and lentils Salted nuts   Fats and Oils   Olive oil and canola oil Low-sodium, low-fat salad dressings and mayonnaise   Butter, margarine, coconut and palm oils, winslow fat   Snacks, Sweets, and Condiments   Low-sodium or unsalted versions of broths, soups, soy sauce, and condiments Pepper, herbs, and spices; vinegar, lemon, or lime juice Low-fat frozen desserts (yogurt, sherbet, fruit bars) Sugar, cocoa powder, honey, syrup, jam, and preserves Low-fat, trans-fat free cookies, cakes, and pies Hong and animal crackers, fig bars, colby snaps   High-fat desserts Broth, soups, gravies, and sauces, made from instant mixes or other high-sodium ingredients Salted snack foods Canned olives Meat tenderizers, seasoning salt, and most flavored vinegars   Beverages   Low-sodium carbonated beverages Tea and coffee in moderation Soy milk   Commercially softened water   Suggestions   Make whole grains, fruits, and vegetables the base of your diet. Choose heart-healthy fats such as canola, olive, and flaxseed oil, and foods high in heart-healthy fats, such as nuts, seeds, soybeans, tofu, and fish. Eat fish at least twice per week; the fish highest in omega-3 fatty acids and lowest in mercury include salmon, herring, mackerel, sardines, and canned chunk light tuna. If you eat fish less than twice per week or have high triglycerides, talk to your doctor about taking fish oil supplements. Read food labels. For products low in fat and cholesterol, look for fat free, low-fat, cholesterol free, saturated fat free, and trans fat freeAlso scan the Nutrition Facts Label, which lists saturated fat, trans fat, and cholesterol amounts. For products low in sodium, look for sodium free, very low sodium, low sodium, no added salt, and unsalted   Skip the salt when cooking or at the table; if food needs more flavor, get creative and try out different herbs and spices. Garlic and onion also add substantial flavor to foods. Trim any visible fat off meat and poultry before cooking, and drain the fat off after kate. Use cooking methods that require little or no added fat, such as grilling, boiling, baking, poaching, broiling, roasting, steaming, stir-frying, and sauting. Avoid fast food and convenience food. They tend to be high in saturated and trans fat and have a lot of added salt. Talk to a registered dietitian for individualized diet advice. Last Reviewed: March 2011 Joshua Srivastava MS, MPH, RD   Updated: 3/29/2011   ·     Preventing Osteoporosis: After Your Visit  Your Care Instructions  Osteoporosis means the bones are weak and thin enough that they can break easily. The older you are, the more likely you are to get osteoporosis. But with plenty of calcium, vitamin D, and exercise, you can help prevent osteoporosis. The preteen and teen years are a key time for bone building. With the help of calcium, vitamin D, and exercise in those early years and beyond, the bones reach their peak density and strength by age 27. After age 27, your bones naturally start to thin and weaken. The stronger your bones are at around age 27, the lower your risk for osteoporosis. But no matter what your age and risk are, your bones still need calcium, vitamin D, and exercise to stay strong. Also avoid smoking, and limit alcohol. Smoking and heavy alcohol use can make your bones thinner. Talk to your doctor about any special risks you might have, such as having a close relative with osteoporosis or taking a medicine that can weaken bones. Your doctor can tell you the best ways to protect your bones from thinning.   Follow-up care is a key part of your treatment and safety. Be sure to make and go to all appointments, and call your doctor if you are having problems. It's also a good idea to know your test results and keep a list of the medicines you take. How can you care for yourself at home? Get enough calcium and vitamin D. The [employer] recommends adults younger than age 46 need 1,000 mg of calcium and 600 IU of vitamin D each day. Women ages 46 to 79 need 1,200 mg of calcium and 600 IU of vitamin D each day. Men ages 46 to 79 need 1,000 mg of calcium and 600 IU of vitamin D each day. Adults 71 and older need 1,200 mg of calcium and 800 IU of vitamin D each day. Eat foods rich in calcium, like yogurt, cheese, milk, and dark green vegetables. Eat foods rich in vitamin D, like eggs, fatty fish, cereal, and fortified milk. Get some sunshine. Your body uses sunshine to make its own vitamin D. The safest time to be out in the sun is before 10 a.m. or after 3 p.m. Avoid getting sunburned. Sunburn can increase your risk of skin cancer. Talk to your doctor about taking a calcium plus vitamin D supplement. Ask about what type of calcium is right for you, and how much to take at a time. Adults ages 23 to 48 should not get more than 2,500 mg of calcium and 4,000 IU of vitamin D each day, whether it is from supplements and/or food. Adults ages 46 and older should not get more than 2,000 mg of calcium and 4,000 IU of vitamin D each day from supplements and/or food. Get regular bone-building exercise. Weight-bearing and resistance exercises keep bones healthy by working the muscles and bones against gravity. Start out at an exercise level that feels right for you. Add a little at a time until you can do the following:  Do 30 minutes of weight-bearing exercise on most days of the week. Walking, jogging, stair climbing, and dancing are good choices. Do resistance exercises with weights or elastic bands 2 to 3 days a week. Limit alcohol.  Drink no more than 1 alcohol drink a day if you are a woman. Drink no more than 2 alcohol drinks a day if you are a man. Do not smoke. Smoking can make bones thin faster. If you need help quitting, talk to your doctor about stop-smoking programs and medicines. These can increase your chances of quitting for good. When should you call for help? Watch closely for changes in your health, and be sure to contact your doctor if:  You need help with a healthy eating plan. You need help with an exercise plan    © [id number] [employer]. Care instructions adapted under license by [facility]. This care instruction is for use with your licensed healthcare professional. If you have questions about a medical condition or this instruction, always ask your healthcare professional. [place] [address] any warranty or liability for your use of this information. Content Version: 9.4.34213; Last Revised: June 20, 2011              ·     Keep Your Memory Anna Barrera       Many factors can affect your ability to remembera hectic lifestyle, aging, stress, chronic disease, and certain medicines. But, there are steps you can take to sharpen your mind and help preserve your memory. Challenge Your Brain   Regularly challenging your mind may help keeps it in top shape. Good mental exercises include:   Crossword puzzlesUse a dictionary if you need it; you will learn more that way. Brainteasers Try some! Crafts, such as wood working and sewing   Hobbies, such as gardening and building model airplanes   SocializingVisit old friends or join groups to meet new ones.    Reading   Learning a new language   Taking a class, whether it be art history or delaney chi   TravelingExperience the food, history, and culture of your destination   Learning to use a computer   Going to museums, the theater, or thought-provoking movies   Changing things in your daily life, such as reversing your pattern in the grocery store or brushing your teeth using your nondominant hand   Use Memory Aids   There is no need to remember every detail on your own. These memory aids can help:   Calendars and day planners   Electronic organizers to store all sorts of helpful informationThese devices can \"beep\" to remind you of appointments. A book of days to record birthdays, anniversaries, and other occasions that occur on the same date every year   Detailed \"to-do\" lists and strategically placed sticky notes   Quick \"study\" sessionsBefore a gathering, review who will be there so their names will be fresh in your mind. Establish routinesFor example, keep your keys, wallet, and umbrella in the same place all the time or take medicine with your 8:00 AM glass of juice   Live a Healthy Life   Many actions that will keep your body strong will do the same for your mind. For example:   Talk to Your Doctor About Herbs and Supplements    Malnutrition and vitamin deficiencies can impair your mental function. For example, vitamin B12 deficiency can cause a range of symptoms, including confusion. But, what if your nutritional needs are being met? Can herbs and supplements still offer a benefit? Researchers have investigated a range of natural remedies, such as ginkgo , ginseng , and the supplement phosphatidylserine (PS). So far, though, the evidence is inconsistent as to whether these products can improve memory or thinking. If you are interested in taking herbs and supplements, talk to your doctor first because they may interact with other medicines that you are taking. Exercise Regularly    Among the many benefits of regular exercise are increased blood flow to the brain and decreased risk of certain diseases that can interfere with memory function. One study found that even moderate exercise has a beneficial effect.  Examples of \"moderate\" exercise include:   Playing 18 holes of golf once a week, without a cart   Playing tennis twice a week   Walking one mile per day   Manage Stress    It can be tough to remember what is important when your mind is cluttered. Make time for relaxation. Choose activities that calm you down, and make it routine. Manage Chronic Conditions    Side effects of high blood pressure , diabetes, and heart disease can interfere with mental function. Many of the lifestyle steps discussed here can help manage these conditions. Strive to eat a healthy diet, exercise regularly, get stress under control, and follow your doctor's advice for your condition. Minimize Medications    Talk to your doctor about the medicines that you take. Some may be unnecessary. Also, healthy lifestyle habits may lower the need for certain drugs. Last Reviewed: April 2010 Kishor Fagan MD   Updated: 4/13/2010   ·     [address]       As we get older, changes in balance, gait, strength, vision, hearing, and cognition make even the most youthful senior more prone to accidents. Falls are one of the leading health risks for older people. This increased risk of falling is related to:   Aging process (eg, decreased muscle strength, slowed reflexes)   Higher incidence of chronic health problems (eg, arthritis, diabetes) that may limit mobility, agility or sensory awareness   Side effects of medicine (eg, dizziness, blurred vision)especially medicines like prescription pain medicines and drugs used to treat mental health conditions   Depending on the brittleness of your bones, the consequences of a fall can be serious and long lasting. Home Life   Research by the Association of Aging [facility]) shows that some home accidents among older adults can be prevented by making simple lifestyle changes and basic modifications and repairs to the home environment. Here are some lifestyle changes that experts recommend:   Have your hearing and vision checked regularly. Be sure to wear prescription glasses that are right for you.    Speak to your doctor or pharmacist about the possible side effects of your medicines. A number of medicines can cause dizziness. If you have problems with sleep, talk to your doctor. Limit your intake of alcohol. If necessary, use a cane or walker to help maintain your balance. Wear supportive, rubber-soled shoes, even at home. If you live in a region that gets wintry weather, you may want to put special cleats on your shoes to prevent you from slipping on the snow and ice. Exercise regularly to help maintain muscle tone, agility, and balance. Always hold the banister when going up or down stairs. Also, use  bars when getting in or out of the bath or shower, or using the toilet. To avoid dizziness, get up slowly from a lying down position. Sit up first, dangling your legs for a minute or two before rising to a standing position. Overall Home Safety Check   According to the Consumer Product Safety Commision's \"Older Consumer Home Safety Checklist,\" it is important to check for potential hazards in each room. And remember, proper lighting is an essential factor in home safety. If you cannot see clearly, you are more likely to fall. Important questions to ask yourself include:   Are lamp, electric, extension, and telephone cords placed out of the flow of traffic and maintained in good condition? Have frayed cords been replaced? Are all small rugs and runners slip resistant? If not, you can secure them to the floor with a special double-sided carpet tape. Are smoke detectors properly locatedone on every floor of your home and one outside of every sleeping area? Are they in good working order? Are batteries replaced at least once a year? Do you have a well-maintained carbon monoxide detector outside every sleeping are in your home? Does your furniture layout leave plenty of space to maneuver between and around chairs, tables, beds, and sofas? Are hallways, stairs and passages between rooms well lit?  Can you reach a lamp without getting out of bed? Are floor surfaces well maintained? Shag rugs, high-pile carpeting, tile floors, and polished wood floors can be particularly slippery. Stairs should always have handrails and be carpeted or fitted with a non-skid tread. Is your telephone easily reachable. Is the cord safely tucked away? Room by Room   According to the Association of Aging, bathrooms and wenceslao are the two most potentially hazardous rooms in your home. In the Kitchen    Be sure your stove is in proper working order and always make sure burners and the oven are off before you go out or go to sleep. Keep pots on the back burners, turn handles away from the front of the stove, and keep stove clean and free of grease build-up. Kitchen ventilation systems and range exhausts should be working properly. Keep flammable objects such as towels and pot holders away from the cooking area except when in use. Make sure kitchen curtains are tied back. Move cords and appliances away from the sink and hot surfaces. If extension cords are needed, install wiring guides so they do not hang over the sink, range, or working areas. Look for coffee pots, kettles and toaster ovens with automatic shut-offs. Keep a mop handy in the kitchen so you can wipe up spills instantly. You should also have a small fire extinguisher. Arrange your kitchen with frequently used items on lower shelves to avoid the need to stand on a stepstool to reach them. Make sure countertops are well-lit to avoid injuries while cutting and preparing food. In the Bathroom    Use a non-slip mat or decals in the tub and shower, since wet, soapy tile or porcelain surfaces are extremely slippery. Make sure bathroom rugs are non-skid or tape them firmly to the floor. Bathtubs should have at least one, preferably two, grab bars, firmly attached to structural supports in the wall.  (Do not use built-in soap holders or glass shower doors as grab bars.) Tub seats fitted with non-slip material on the legs allow you to wash sitting down. For people with limited mobility, bathtub transfer benches allow you to slide safely into the tub. Raised toilet seats and toilet safety rails are helpful for those with knee or hip problems. In the [facility]    Make sure you use a nightlight and that the area around your bed is clear of potential obstacles. Be careful with electric blankets and never go to sleep with a heating pad, which can cause serious burns even if on a low setting. Use fire-resistant mattress covers and pillows, and NEVER smoke in bed. Keep a phone next to the bed that is programmed to dial 911 at the push of a button. If you have a chronic condition, you may want to sign on with an automatic call-in service. Typically the system includes a small pendant that connects directly to an emergency medical voice-response system. You should also make arrangements to stay in contact with someonefriend, neighbor, family memberon a regular schedule. Fire Prevention   According to the [employer]. (Smoke Alarms for Every) [address] [place] [address], senior citizens are one of the two highest risk groups for death and serious injuries due to residential fires. When cooking, wear short-sleeved items, never a bulky long-sleeved robe. The [facility]'s Safety Checklist for Older Consumers emphasizes the importance of checking basements, garages, workshops and storage areas for fire hazards, such as volatile liquids, piles of old rags or clothing and overloaded circuits. Never smoke in bed or when lying down on a couch or recliner chair. Small portable electric or kerosene heaters are responsible for many home fires and should be used cautiously if at all. If you do use one, be sure to keep them away from flammable materials. In case of fire, make sure you have a pre-established emergency exit plan.     Have a professional check your fireplace and other fuel-burning appliances yearly. Helping [employer]   Baby boomers entering the meadows years will continue to see the development of new products to help older adults live safely and independently in spite of age-related changes. Making Life More Livable  , by Lili Luna, lists over 1,000 products for \"living well in the mature years,\" such as bathing and mobility aids, household security devices, ergonomically designed knives and peelers, and faucet valves and knobs for temperature control. Medical supply stores and organizations are good sources of information about products that improve your quality of life and insure your safety. Last Reviewed: November 2009 Dusty Power MD   Updated: 3/7/2011     ·        Advance Care Planning: Care Instructions  Your Care Instructions     It can be hard to live with an illness that cannot be cured. But if your health is getting worse, you may want to make decisions about end-of-life care. Planning for the end of your life does not mean that you are giving up. It is a way to make sure that your wishes are met. Clearly stating your wishes can make it easier for your loved ones. Making plans while you are still able may alsoease your mind and make your final days less stressful and more meaningful. Follow-up care is a key part of your treatment and safety. Be sure to make and go to all appointments, and call your doctor if you are having problems. It's also a good idea to know your test results and keep alist of the medicines you take. What can you do to plan for the end of life? You can bring these issues up with your doctor. You do not need to wait until your doctor starts the conversation. You might start with, \"What makes life worth living for me is. [place] \" And then follow it with, \"I would not be willing to live with . [place] \" When you complete this sentence it helps your doctor understand your wishes. Talk openly and honestly with your doctor.  This is the best way to understand the decisions you will need to make as your health changes. Know that you can always change your mind. Ask your doctor about commonly used life-support measures. These include tube feedings, breathing machines, and fluids given through a vein (IV). Understanding these treatments will help you decide whether you want them. You may choose to have these life-supporting treatments for a limited time. This allows a trial period to see whether they will help you. You may also decide that you want your doctor to take only certain measures to keep you alive. It may help to think about the big picture, like what makes life worth living for you or what your values and goals are. Talk to your doctor about how long you are likely to live. Your doctor may be able to give you an idea of what usually happens with your specific illness. Think about preparing papers that state your wishes. These papers are called advance directives. If you do this early and review them often, there will not be any confusion about what you want. You can change your instructions at any time. Which papers should you prepare? Advance directives are legal papers that tell doctors how you want to be cared for at the end of your life. You do not need a  to write these papers. Ask your doctor or your state health department for information on how to write your advance directives. They may have the forms for each of these types of papers. Make sure your doctor has a copy of these on file, and give a copy to afamily member or close friend. Consider a do-not-resuscitate order (DNR). This order asks that no extra treatments be done if your heart stops or you stop breathing. Extra treatments may include cardiopulmonary resuscitation (CPR), electrical shock to restart your heart, or a machine to breathe for you. If you decide to have a DNR order, ask your doctor to explain and write it.  Place the order in your home where everyone can easily see it. Consider a living will. A living will explains your wishes about life support and other treatments at the end of your life if you become unable to speak for yourself. Living acosta tell doctors to use or not use treatments that would keep you alive. You must have one or two witnesses or a notary present when you sign this form. A living will may be called something else in your state. Consider a medical power of . This form allows you to name a person to make decisions about your care if you are not able to. Most people ask a close friend or family member. Talk to this person about the kinds of treatments you want and those that you do not want. Make sure this person understands your wishes. A medical power of  may be called something else in your state. These legal papers are simple to change. Tell your doctor what you want to change, and ask him or her to make a note in your medical file. Give yourfamily updated copies of the papers. Where can you learn more? Go to https://[employer].[email or web address] org and sign in to your [employer] account. Enter [id number] in the [facility] box to learn more about \"Advance Care Planning: Care Instructions. \"     If you do not have an account, please click on the \"Sign Up Now\" link. Current as of: October 18, 2021               Content Version: 13.2  © 7092-8900 Healthwise, Incorporated. Care instructions adapted under license by [facility] [place] ([facility]). If you have questions about a medical condition or this instruction, always ask your healthcare professional. [place] [address] any warranty or liability for your use of this information. ·        Learning About Living Genna Amaral  What is a living will? A living will, also called a declaration, is a legal form. It tells your family and your doctor your wishes when you can't speak for yourself.  It's used by the health professionals who will treat you as you near the end of your life or ifyou get seriously hurt or ill. If you put your wishes in writing, your loved ones and others will know what kind of care you want. They won't need to guess. This can ease your mind and behelpful to others. And you can change or cancel your living will at any time. A living will is not the same as an estate or property will. An estate willexplains what you want to happen with your money and property after you die. How do you use it? Keep these facts in mind about how a living will is used. Your living will is used only if you can't speak or make decisions for yourself. Most often, one or more doctors must certify that you can't speak or decide for yourself before your living will takes effect. If you get better and can speak for yourself again, you can accept or refuse any treatment. It doesn't matter what you said in your living will. Some states may limit your right to refuse treatment in certain cases. For example, you may need to clearly state in your living will that you don't want artificial hydration and nutrition, such as being fed through a tube. Is a living will a legal document? A living will is a legal document. Each state has its own laws about livingwills. And a living will may be called something else in your state. Here are some things to know about living acosta. You don't need an  to complete a living will. But legal advice can be helpful if your state's laws are unclear. It can also help if your health history is complicated or your family can't agree on what should be in your living will. You can change your living will at any time. Some people find that their wishes about end-of-life care change as their health changes. If you make big changes to your living will, complete a new form. If you move to another state, make sure that your living will is legal in the state where you now live.  In most cases, doctors will respect your wishes even if you have a form from a different state. You might use a universal form that has been approved by many states. This kind of form can sometimes be filled out and stored online. Your digital copy will then be available wherever you have a connection to the internet. The doctors and nurses who need to treat you can find it right away. Your state may offer an online registry. This is another place where you can store your living will online. It's a good idea to get your living will notarized. This means using a person called a [employer] to watch two people sign, or witness, your living will. What should you know when you create a living will? Here are some questions to ask yourself as you make your living will. Do you know enough about life support methods that might be used? If not, talk to your doctor so you know what might be done if you can't breathe on your own, your heart stops, or you can't swallow. What things would you still want to be able to do after you receive life-support methods? Would you want to be able to walk? To speak? To eat on your own? To live without the help of machines? Do you want certain Restorationist practices performed if you become very ill? If you have a choice, where do you want to be cared for? In your home? At a hospital or nursing home? If you have a choice at the end of your life, where would you prefer to die? At home? In a hospital or nursing home? Somewhere else? Would you prefer to be buried or cremated? Do you want your organs to be donated after you die? What should you do with your living will? Make sure that your family members and your health care agent have copies of your living will (also called a declaration). Give your doctor a copy of your living will. Ask to have it kept as part of your medical record. If you have more than one doctor, make sure that each one has a copy. Put a copy of your living will where it can be easily found.  For example, some people may put a copy on their refrigerator door. If you are using a digital copy, be sure your doctor, family members, and health care agent know how to find and access it. Where can you learn more? Go to [email or web address] org and sign in to your [employer] account. Enter T273 in the [facility] box to learn more about \"Learning About Living [place]. \"     If you do not have an account, please click on the \"Sign Up Now\" link. Current as of: October 18, 2021               Content Version: 13.2  © 0190-6954 [employer]. Care instructions adapted under license by [facility] ([facility]). If you have questions about a medical condition or this instruction, always ask your healthcare professional. [address] any warranty or liability for your use of this information. ·        Learning About Medical Power of   What is a medical power of ? A medical power of , also called a durable power of  for health care, is one type of the legal forms called advance directives. It lets you name the person you want to make treatment decisions for you if you can't speak or decide for yourself. The person you choose is called your health care agent. This person is also called a health care proxy or health care surrogate. A medical power of  may be called something else in your state. How do you choose a health care agent? Choose your health care agent carefully. This person may or may not be a familymember. Talk to the person before you make your final decision. Make sure he or she iscomfortable with this responsibility. It's a good idea to choose someone who:  Is at least 25years old. Knows you well and understands what makes life meaningful for you. Understands your Restoration and moral values. Will do what you want, not what he or she wants. Will be able to make difficult choices at a stressful time.   Will be able to refuse or stop treatment, if that is what you would want, even if you could die. Will be firm and confident with health professionals if needed. Will ask questions to get needed information. Lives near you or agrees to travel to you if needed. Your family may help you make medical decisions while you can still be part of that process. But it's important to choose one person to be your health careagent in case you aren't able to make decisions for yourself. If you don't fill out the legal form and name a health care agent, thedecisions your family can make may be limited. A health care agent may be called something else in your state. Who will make decisions for you if you don't have a health care agent? If you don't have a health care agent or a living will, you may not get the care you want. Decisions may be made by family members who disagree about your medical care. Or decisions may be made by a medical professional who doesn'tknow you well. In some cases, a  makes the decisions. When you name a health care agent, it is very clear who has the power to [place] decisions for you. How do you name a health care agent? You name your health care agent on a legal form. This form is usually called a medical power of . Ask your hospital, state bar association, or officeon aging where to find these forms. You must sign the form to make it legal. Some states require you to get the form notarized. This means that a person called a  watches you sign the form and then he or she signs the form. Some states also require thattwo or more witnesses sign the form. Be sure to tell your family members and doctors who your health care agent is. Where can you learn more? Go to [email or web address].[email or web address] org and sign in to your [employer] account. Enter [id number] in the [employer] box to learn more about \"Learning About [address]. \"     If you do not have an account, please click on the \"Sign

## 2022-04-20 RX ORDER — LISINOPRIL AND HYDROCHLOROTHIAZIDE 25; 20 MG/1; MG/1
TABLET ORAL
Qty: 90 TABLET | Refills: 1 | Status: SHIPPED | OUTPATIENT
Start: 2022-04-20 | End: 2022-09-23 | Stop reason: SDUPTHER

## 2022-05-16 DIAGNOSIS — I10 BENIGN ESSENTIAL HTN: ICD-10-CM

## 2022-05-16 RX ORDER — AMLODIPINE BESYLATE 5 MG/1
5 TABLET ORAL NIGHTLY
Qty: 90 TABLET | Refills: 1 | Status: SHIPPED | OUTPATIENT
Start: 2022-05-16 | End: 2022-09-23 | Stop reason: SDUPTHER

## 2022-05-31 DIAGNOSIS — E78.5 HYPERLIPIDEMIA, UNSPECIFIED HYPERLIPIDEMIA TYPE: ICD-10-CM

## 2022-05-31 RX ORDER — ROSUVASTATIN CALCIUM 10 MG/1
TABLET, COATED ORAL
Qty: 90 TABLET | Refills: 3 | Status: SHIPPED | OUTPATIENT
Start: 2022-05-31 | End: 2022-09-23 | Stop reason: SDUPTHER

## 2022-06-18 DIAGNOSIS — G62.9 NEUROPATHY: ICD-10-CM

## 2022-06-18 DIAGNOSIS — G25.81 RESTLESS LEG SYNDROME: ICD-10-CM

## 2022-06-20 RX ORDER — GABAPENTIN 600 MG/1
TABLET ORAL
Qty: 60 TABLET | Refills: 2 | Status: SHIPPED | OUTPATIENT
Start: 2022-06-20 | End: 2022-09-23 | Stop reason: SDUPTHER

## 2022-07-21 DIAGNOSIS — E11.40 TYPE 2 DIABETES MELLITUS WITH DIABETIC NEUROPATHY, WITHOUT LONG-TERM CURRENT USE OF INSULIN (HCC): ICD-10-CM

## 2022-07-21 RX ORDER — GLIPIZIDE 10 MG/1
10 TABLET ORAL
Qty: 90 TABLET | Refills: 2 | Status: SHIPPED | OUTPATIENT
Start: 2022-07-21 | End: 2022-09-23 | Stop reason: SDUPTHER

## 2022-08-15 DIAGNOSIS — G62.9 NEUROPATHY: ICD-10-CM

## 2022-08-15 RX ORDER — IBUPROFEN 800 MG/1
800 TABLET ORAL EVERY 8 HOURS PRN
Qty: 120 TABLET | Refills: 2 | Status: SHIPPED | OUTPATIENT
Start: 2022-08-15

## 2022-09-20 DIAGNOSIS — Z11.59 NEED FOR HEPATITIS C SCREENING TEST: ICD-10-CM

## 2022-09-20 DIAGNOSIS — E11.40 TYPE 2 DIABETES MELLITUS WITH DIABETIC NEUROPATHY, WITHOUT LONG-TERM CURRENT USE OF INSULIN (HCC): ICD-10-CM

## 2022-09-20 LAB
ALBUMIN SERPL-MCNC: 4.2 G/DL (ref 3.5–5.2)
ALP BLD-CCNC: 66 U/L (ref 40–130)
ALT SERPL-CCNC: 14 U/L (ref 5–41)
ANION GAP SERPL CALCULATED.3IONS-SCNC: 11 MMOL/L (ref 7–19)
AST SERPL-CCNC: 19 U/L (ref 5–40)
BILIRUB SERPL-MCNC: 0.4 MG/DL (ref 0.2–1.2)
BILIRUBIN URINE: NEGATIVE
BLOOD, URINE: NEGATIVE
BUN BLDV-MCNC: 22 MG/DL (ref 8–23)
CALCIUM SERPL-MCNC: 9 MG/DL (ref 8.8–10.2)
CHLORIDE BLD-SCNC: 101 MMOL/L (ref 98–111)
CHOLESTEROL, TOTAL: 101 MG/DL (ref 160–199)
CLARITY: CLEAR
CO2: 29 MMOL/L (ref 22–29)
COLOR: YELLOW
CREAT SERPL-MCNC: 0.8 MG/DL (ref 0.5–1.2)
GFR AFRICAN AMERICAN: >59
GFR NON-AFRICAN AMERICAN: >60
GLUCOSE BLD-MCNC: 64 MG/DL (ref 74–109)
GLUCOSE URINE: NEGATIVE MG/DL
HBA1C MFR BLD: 6.2 % (ref 4–6)
HCT VFR BLD CALC: 44.1 % (ref 42–52)
HDLC SERPL-MCNC: 40 MG/DL (ref 55–121)
HEMOGLOBIN: 13.9 G/DL (ref 14–18)
HEPATITIS C ANTIBODY INTERPRETATION: NORMAL
KETONES, URINE: ABNORMAL MG/DL
LDL CHOLESTEROL CALCULATED: 48 MG/DL
LEUKOCYTE ESTERASE, URINE: NEGATIVE
MCH RBC QN AUTO: 30 PG (ref 27–31)
MCHC RBC AUTO-ENTMCNC: 31.5 G/DL (ref 33–37)
MCV RBC AUTO: 95 FL (ref 80–94)
MICROALBUMIN UR-MCNC: <1.2 MG/DL (ref 0–19)
NITRITE, URINE: NEGATIVE
PDW BLD-RTO: 15.3 % (ref 11.5–14.5)
PH UA: 5.5 (ref 5–8)
PLATELET # BLD: 173 K/UL (ref 130–400)
PMV BLD AUTO: 9.9 FL (ref 9.4–12.4)
POTASSIUM SERPL-SCNC: 3.8 MMOL/L (ref 3.5–5)
PROTEIN UA: NEGATIVE MG/DL
RBC # BLD: 4.64 M/UL (ref 4.7–6.1)
SODIUM BLD-SCNC: 141 MMOL/L (ref 136–145)
SPECIFIC GRAVITY UA: 1.02 (ref 1–1.03)
TOTAL PROTEIN: 7.3 G/DL (ref 6.6–8.7)
TRIGL SERPL-MCNC: 66 MG/DL (ref 0–149)
UROBILINOGEN, URINE: 1 E.U./DL
WBC # BLD: 6.8 K/UL (ref 4.8–10.8)

## 2022-09-23 ENCOUNTER — OFFICE VISIT (OUTPATIENT)
Dept: FAMILY MEDICINE CLINIC | Age: 67
End: 2022-09-23
Payer: MEDICARE

## 2022-09-23 VITALS
DIASTOLIC BLOOD PRESSURE: 86 MMHG | HEIGHT: 73 IN | SYSTOLIC BLOOD PRESSURE: 142 MMHG | WEIGHT: 315 LBS | BODY MASS INDEX: 41.75 KG/M2 | OXYGEN SATURATION: 94 % | HEART RATE: 83 BPM | TEMPERATURE: 98.1 F

## 2022-09-23 DIAGNOSIS — F32.A ANXIETY AND DEPRESSION: ICD-10-CM

## 2022-09-23 DIAGNOSIS — I10 BENIGN ESSENTIAL HTN: ICD-10-CM

## 2022-09-23 DIAGNOSIS — E11.40 TYPE 2 DIABETES MELLITUS WITH DIABETIC NEUROPATHY, WITHOUT LONG-TERM CURRENT USE OF INSULIN (HCC): ICD-10-CM

## 2022-09-23 DIAGNOSIS — G25.81 RESTLESS LEG SYNDROME: ICD-10-CM

## 2022-09-23 DIAGNOSIS — F41.9 ANXIETY AND DEPRESSION: ICD-10-CM

## 2022-09-23 DIAGNOSIS — E78.5 HYPERLIPIDEMIA, UNSPECIFIED HYPERLIPIDEMIA TYPE: ICD-10-CM

## 2022-09-23 PROCEDURE — G8427 DOCREV CUR MEDS BY ELIG CLIN: HCPCS | Performed by: FAMILY MEDICINE

## 2022-09-23 PROCEDURE — 99214 OFFICE O/P EST MOD 30 MIN: CPT | Performed by: FAMILY MEDICINE

## 2022-09-23 PROCEDURE — 2022F DILAT RTA XM EVC RTNOPTHY: CPT | Performed by: FAMILY MEDICINE

## 2022-09-23 PROCEDURE — 3044F HG A1C LEVEL LT 7.0%: CPT | Performed by: FAMILY MEDICINE

## 2022-09-23 PROCEDURE — 1123F ACP DISCUSS/DSCN MKR DOCD: CPT | Performed by: FAMILY MEDICINE

## 2022-09-23 PROCEDURE — 3017F COLORECTAL CA SCREEN DOC REV: CPT | Performed by: FAMILY MEDICINE

## 2022-09-23 PROCEDURE — 1036F TOBACCO NON-USER: CPT | Performed by: FAMILY MEDICINE

## 2022-09-23 PROCEDURE — G8417 CALC BMI ABV UP PARAM F/U: HCPCS | Performed by: FAMILY MEDICINE

## 2022-09-23 RX ORDER — PIOGLITAZONEHYDROCHLORIDE 30 MG/1
30 TABLET ORAL DAILY
Qty: 90 TABLET | Refills: 3 | Status: SHIPPED | OUTPATIENT
Start: 2022-09-23

## 2022-09-23 RX ORDER — GABAPENTIN 600 MG/1
TABLET ORAL
Qty: 60 TABLET | Refills: 2 | Status: SHIPPED | OUTPATIENT
Start: 2022-09-23 | End: 2022-10-22

## 2022-09-23 RX ORDER — SERTRALINE HYDROCHLORIDE 100 MG/1
100 TABLET, FILM COATED ORAL DAILY
Qty: 90 TABLET | Refills: 3 | Status: SHIPPED | OUTPATIENT
Start: 2022-09-23

## 2022-09-23 RX ORDER — ROSUVASTATIN CALCIUM 10 MG/1
TABLET, COATED ORAL
Qty: 90 TABLET | Refills: 3 | Status: SHIPPED | OUTPATIENT
Start: 2022-09-23

## 2022-09-23 RX ORDER — LISINOPRIL AND HYDROCHLOROTHIAZIDE 25; 20 MG/1; MG/1
TABLET ORAL
Qty: 90 TABLET | Refills: 3 | Status: SHIPPED | OUTPATIENT
Start: 2022-09-23

## 2022-09-23 RX ORDER — AMLODIPINE BESYLATE 5 MG/1
5 TABLET ORAL NIGHTLY
Qty: 90 TABLET | Refills: 3 | Status: SHIPPED | OUTPATIENT
Start: 2022-09-23

## 2022-09-23 RX ORDER — GLIPIZIDE 10 MG/1
10 TABLET ORAL
Qty: 90 TABLET | Refills: 2 | Status: SHIPPED | OUTPATIENT
Start: 2022-09-23

## 2022-09-23 ASSESSMENT — ENCOUNTER SYMPTOMS
RESPIRATORY NEGATIVE: 1
GASTROINTESTINAL NEGATIVE: 1
ALLERGIC/IMMUNOLOGIC NEGATIVE: 1
EYES NEGATIVE: 1

## 2022-09-23 NOTE — PROGRESS NOTES
SUBJECTIVE:    Patient ID: Herbie Bonner is a 75 y.o.male. HPI:   Patient here for follow-up of multiple medical problems. Patient is a 77-year-old male. He is morbid obese. He has diabetes. He is trying to lose weight. Diabetic complications include neuropathy. He takes gabapentin. Medication is helpful. Also help with presents leg syndrome. A1c is well controlled. He also have hypertension. Blood pressure is well controlled at home. Today slightly elevated. He will monitor at home. He also have hyperlipidemia. Takes high potency statin therapy. He is compliant with therapy. Denies medication side effect. He also takes Zoloft for anxiety and depression. Medication is helpful. Denies medication side effect.        Past Medical History:   Diagnosis Date    Anxiety     Benign essential HTN 7/10/2020    Depression     HTN (hypertension)     Hyperlipidemia 7/10/2020    Pneumonia     Type 2 diabetes mellitus ([facility] [address] 5/2016      Current Outpatient Medications   Medication Sig Dispense Refill    glipiZIDE (GLUCOTROL) 10 MG tablet Take 1 tablet by mouth daily (with breakfast) 90 tablet 2    rosuvastatin (CRESTOR) 10 MG tablet Take 1 tablet by mouth once daily 90 tablet 3    amLODIPine (NORVASC) 5 MG tablet Take 1 tablet by mouth nightly Take 1 tablet by mouth nightly 90 tablet 3    lisinopril-hydroCHLOROthiazide (PRINZIDE;ZESTORETIC) 20-25 MG per tablet Take 1 tablet by mouth once daily 90 tablet 3    sertraline (ZOLOFT) 100 MG tablet Take 1 tablet by mouth daily 90 tablet 3    pioglitazone (ACTOS) 30 MG tablet Take 1 tablet by mouth daily 90 tablet 3    gabapentin (NEURONTIN) 600 MG tablet Take 1 tablet by mouth twice daily 60 tablet 2    ibuprofen (ADVIL;MOTRIN) 800 MG tablet TAKE 1 TABLET BY MOUTH EVERY 8 HOURS AS NEEDED FOR PAIN 120 tablet 2    ipratropium-albuterol (DUONEB) 0.5-2.5 (3) MG/3ML SOLN nebulizer solution Inhale 3 mLs into the lungs every 4 hours as needed for Shortness of Breath or Other (wheezing) 360 mL 1    glucose monitoring kit (FREESTYLE) monitoring kit DX: Diabetes  Dispense store brand or whatever brand insurance will cover. Glucometer, lancets and testing strips for tid testing 30 days 1 kit 0     No current facility-administered medications for this visit. Allergies   Allergen Reactions    Latex     Aspirin     Penicillins        Review of Systems   Constitutional: Negative. HENT: Negative. Eyes: Negative. Respiratory: Negative. Cardiovascular: Negative. Gastrointestinal: Negative. Endocrine: Negative. Genitourinary: Negative. Musculoskeletal: Negative. Skin: Negative. Allergic/Immunologic: Negative. Neurological: Negative. Hematological: Negative. Psychiatric/Behavioral: Negative. OBJECTIVE:    Physical Exam  Vitals reviewed. Constitutional:       Appearance: Normal appearance. He is well-developed. HENT:      Head: Normocephalic and atraumatic. Right Ear: Tympanic membrane, ear canal and external ear normal. There is no impacted cerumen. Left Ear: Tympanic membrane, ear canal and external ear normal. There is no impacted cerumen. Nose: Nose normal.      Mouth/Throat:      Lips: Pink. Mouth: Mucous membranes are moist.      Dentition: Normal dentition. Tongue: No lesions. Pharynx: Oropharynx is clear. Uvula midline. Tonsils: No tonsillar exudate or tonsillar abscesses. Eyes:      General: Lids are normal.         Right eye: No discharge. Left eye: No discharge. Extraocular Movements:      Right eye: Normal extraocular motion. Left eye: Normal extraocular motion. Conjunctiva/sclera: Conjunctivae normal.      Right eye: Right conjunctiva is not injected. Left eye: Left conjunctiva is not injected. Pupils: Pupils are equal, round, and reactive to light. Neck:      Thyroid: No thyromegaly. Vascular: No carotid bruit or JVD.    Cardiovascular:      Rate and Rhythm: Normal rate and regular rhythm. Pulses:           Carotid pulses are 2+ on the right side and 2+ on the left side. Radial pulses are 2+ on the right side and 2+ on the left side. Heart sounds: Normal heart sounds, S1 normal and S2 normal. No murmur heard. Pulmonary:      Effort: Pulmonary effort is normal. No accessory muscle usage. Breath sounds: Normal breath sounds. Abdominal:      General: Bowel sounds are normal. There is no distension or abdominal bruit. Palpations: Abdomen is soft. There is no mass. Tenderness: There is no abdominal tenderness. Hernia: No hernia is present. Musculoskeletal:         General: Normal range of motion. Cervical back: Normal range of motion and neck supple. Right lower leg: No edema. Left lower leg: No edema. Lymphadenopathy:      Cervical:      Right cervical: No superficial cervical adenopathy. Left cervical: No superficial cervical adenopathy. Skin:     General: Skin is warm and dry. Coloration: Skin is not jaundiced or pale. Findings: No lesion or rash. Nails: There is no clubbing. Neurological:      Mental Status: He is alert and oriented to person, place, and time. Cranial Nerves: No facial asymmetry. Motor: No weakness or tremor. Coordination: Coordination normal.      Gait: Gait normal.      Deep Tendon Reflexes: Reflexes are normal and symmetric. Psychiatric:         Attention and Perception: Attention normal.         Mood and Affect: Mood normal.         Speech: Speech normal.         Behavior: Behavior normal.         Thought Content:  Thought content normal.         Cognition and Memory: Memory normal.         Judgment: Judgment normal.      BP (!) 142/86 (Site: Left Upper Arm, Position: Sitting, Cuff Size: Medium Adult)   Pulse 83   Temp 98.1 °F (36.7 °C) (Temporal)   Ht 6' 1\" (1.854 m)   Wt (!) 315 lb (142.9 kg)   SpO2 94%   BMI 41.56 kg/m² ASSESSMENT:     Diagnosis Orders   1. Type 2 diabetes mellitus with diabetic neuropathy, without long-term current use of insulin (HCC)-well-controlled glipiZIDE (GLUCOTROL) 10 MG tablet    gabapentin (NEURONTIN) 600 MG tablet      2. Hyperlipidemia, unspecified hyperlipidemia type-on therapy rosuvastatin (CRESTOR) 10 MG tablet      3. Benign essential HTN-stable amLODIPine (NORVASC) 5 MG tablet    lisinopril-hydroCHLOROthiazide (PRINZIDE;ZESTORETIC) 20-25 MG per tablet      4. Anxiety and depression-stable sertraline (ZOLOFT) 100 MG tablet      5. Restless leg syndrome-stable gabapentin (NEURONTIN) 600 MG tablet           PLAN:    1. Continue same treatment plan. Encourage diet and exercise. Repeat blood work in 6 months  2. Continue medication  3. Continue medications monitor blood pressures at home. Systolic goal of less than 140.  4.  Continue medication  5.   Adan Barrera and continue medication  Follow-up 6 months

## 2022-12-26 DIAGNOSIS — E11.40 TYPE 2 DIABETES MELLITUS WITH DIABETIC NEUROPATHY, WITHOUT LONG-TERM CURRENT USE OF INSULIN (HCC): ICD-10-CM

## 2022-12-26 DIAGNOSIS — G25.81 RESTLESS LEG SYNDROME: ICD-10-CM

## 2022-12-27 RX ORDER — GABAPENTIN 600 MG/1
TABLET ORAL
Qty: 60 TABLET | Refills: 2 | Status: SHIPPED | OUTPATIENT
Start: 2022-12-27 | End: 2023-01-27

## 2023-04-05 DIAGNOSIS — G25.81 RESTLESS LEG SYNDROME: ICD-10-CM

## 2023-04-05 DIAGNOSIS — E11.40 TYPE 2 DIABETES MELLITUS WITH DIABETIC NEUROPATHY, WITHOUT LONG-TERM CURRENT USE OF INSULIN (HCC): ICD-10-CM

## 2023-04-05 RX ORDER — GABAPENTIN 600 MG/1
TABLET ORAL
Qty: 60 TABLET | Refills: 0 | Status: SHIPPED | OUTPATIENT
Start: 2023-04-05 | End: 2023-05-05

## 2023-05-01 ENCOUNTER — TELEPHONE (OUTPATIENT)
Dept: PRIMARY CARE CLINIC | Age: 68
End: 2023-05-01

## 2023-05-04 DIAGNOSIS — E11.9 TYPE 2 DIABETES MELLITUS WITHOUT COMPLICATION, UNSPECIFIED WHETHER LONG TERM INSULIN USE (HCC): Primary | ICD-10-CM

## 2023-05-04 DIAGNOSIS — E78.5 HYPERLIPIDEMIA, UNSPECIFIED HYPERLIPIDEMIA TYPE: ICD-10-CM

## 2023-05-04 DIAGNOSIS — F32.A ANXIETY AND DEPRESSION: ICD-10-CM

## 2023-05-04 DIAGNOSIS — I10 BENIGN ESSENTIAL HTN: ICD-10-CM

## 2023-05-04 DIAGNOSIS — F41.9 ANXIETY AND DEPRESSION: ICD-10-CM

## 2023-05-04 DIAGNOSIS — E11.9 TYPE 2 DIABETES MELLITUS WITHOUT COMPLICATION, UNSPECIFIED WHETHER LONG TERM INSULIN USE (HCC): ICD-10-CM

## 2023-05-04 LAB
ALBUMIN SERPL-MCNC: 4.2 G/DL (ref 3.5–5.2)
ALP SERPL-CCNC: 59 U/L (ref 40–130)
ALT SERPL-CCNC: 13 U/L (ref 5–41)
ANION GAP SERPL CALCULATED.3IONS-SCNC: 12 MMOL/L (ref 7–19)
AST SERPL-CCNC: 16 U/L (ref 5–40)
BILIRUB SERPL-MCNC: 0.5 MG/DL (ref 0.2–1.2)
BUN SERPL-MCNC: 28 MG/DL (ref 8–23)
CALCIUM SERPL-MCNC: 9.1 MG/DL (ref 8.8–10.2)
CHLORIDE SERPL-SCNC: 102 MMOL/L (ref 98–111)
CHOLEST SERPL-MCNC: 101 MG/DL (ref 160–199)
CO2 SERPL-SCNC: 28 MMOL/L (ref 22–29)
CREAT SERPL-MCNC: 0.9 MG/DL (ref 0.5–1.2)
CREAT UR-MCNC: 124.1 MG/DL (ref 4.2–622)
ERYTHROCYTE [DISTWIDTH] IN BLOOD BY AUTOMATED COUNT: 16.3 % (ref 11.5–14.5)
GLUCOSE SERPL-MCNC: 61 MG/DL (ref 74–109)
HBA1C MFR BLD: 6.1 % (ref 4–6)
HCT VFR BLD AUTO: 43.3 % (ref 42–52)
HDLC SERPL-MCNC: 45 MG/DL (ref 55–121)
HGB BLD-MCNC: 13.8 G/DL (ref 14–18)
LDLC SERPL CALC-MCNC: 44 MG/DL
MCH RBC QN AUTO: 29.8 PG (ref 27–31)
MCHC RBC AUTO-ENTMCNC: 31.9 G/DL (ref 33–37)
MCV RBC AUTO: 93.5 FL (ref 80–94)
MICROALBUMIN UR-MCNC: <1.2 MG/DL (ref 0–19)
MICROALBUMIN/CREAT UR-RTO: NORMAL MG/G
PLATELET # BLD AUTO: 188 K/UL (ref 130–400)
PMV BLD AUTO: 10 FL (ref 9.4–12.4)
POTASSIUM SERPL-SCNC: 3.9 MMOL/L (ref 3.5–5)
PROT SERPL-MCNC: 7.5 G/DL (ref 6.6–8.7)
RBC # BLD AUTO: 4.63 M/UL (ref 4.7–6.1)
SODIUM SERPL-SCNC: 142 MMOL/L (ref 136–145)
TRIGL SERPL-MCNC: 58 MG/DL (ref 0–149)
TSH SERPL DL<=0.005 MIU/L-ACNC: 1.83 UIU/ML (ref 0.27–4.2)
WBC # BLD AUTO: 7.5 K/UL (ref 4.8–10.8)

## 2023-05-05 ENCOUNTER — OFFICE VISIT (OUTPATIENT)
Dept: FAMILY MEDICINE CLINIC | Age: 68
End: 2023-05-05

## 2023-05-05 VITALS
WEIGHT: 310 LBS | OXYGEN SATURATION: 98 % | HEIGHT: 73 IN | SYSTOLIC BLOOD PRESSURE: 130 MMHG | DIASTOLIC BLOOD PRESSURE: 80 MMHG | HEART RATE: 79 BPM | TEMPERATURE: 98.3 F | BODY MASS INDEX: 41.08 KG/M2

## 2023-05-05 DIAGNOSIS — M15.9 OSTEOARTHRITIS OF MULTIPLE JOINTS, UNSPECIFIED OSTEOARTHRITIS TYPE: ICD-10-CM

## 2023-05-05 DIAGNOSIS — F41.9 ANXIETY AND DEPRESSION: ICD-10-CM

## 2023-05-05 DIAGNOSIS — E11.40 TYPE 2 DIABETES MELLITUS WITH DIABETIC NEUROPATHY, WITHOUT LONG-TERM CURRENT USE OF INSULIN (HCC): Primary | ICD-10-CM

## 2023-05-05 DIAGNOSIS — I10 BENIGN ESSENTIAL HTN: ICD-10-CM

## 2023-05-05 DIAGNOSIS — E78.5 HYPERLIPIDEMIA, UNSPECIFIED HYPERLIPIDEMIA TYPE: ICD-10-CM

## 2023-05-05 DIAGNOSIS — F32.A ANXIETY AND DEPRESSION: ICD-10-CM

## 2023-05-05 DIAGNOSIS — G25.81 RESTLESS LEG SYNDROME: ICD-10-CM

## 2023-05-05 RX ORDER — LISINOPRIL AND HYDROCHLOROTHIAZIDE 25; 20 MG/1; MG/1
TABLET ORAL
Qty: 90 TABLET | Refills: 3 | Status: SHIPPED | OUTPATIENT
Start: 2023-05-05

## 2023-05-05 RX ORDER — IBUPROFEN 800 MG/1
800 TABLET ORAL EVERY 8 HOURS PRN
Qty: 90 TABLET | Refills: 3 | Status: SHIPPED | OUTPATIENT
Start: 2023-05-05

## 2023-05-05 RX ORDER — SERTRALINE HYDROCHLORIDE 100 MG/1
100 TABLET, FILM COATED ORAL DAILY
Qty: 90 TABLET | Refills: 3 | Status: SHIPPED | OUTPATIENT
Start: 2023-05-05

## 2023-05-05 RX ORDER — GLIPIZIDE 10 MG/1
10 TABLET ORAL
Qty: 90 TABLET | Refills: 3 | Status: SHIPPED | OUTPATIENT
Start: 2023-05-05 | End: 2023-05-05

## 2023-05-05 RX ORDER — AMLODIPINE BESYLATE 5 MG/1
5 TABLET ORAL NIGHTLY
Qty: 90 TABLET | Refills: 3 | Status: SHIPPED | OUTPATIENT
Start: 2023-05-05

## 2023-05-05 RX ORDER — ROSUVASTATIN CALCIUM 10 MG/1
TABLET, COATED ORAL
Qty: 90 TABLET | Refills: 3 | Status: SHIPPED | OUTPATIENT
Start: 2023-05-05

## 2023-05-05 RX ORDER — TIRZEPATIDE 2.5 MG/.5ML
2.5 INJECTION, SOLUTION SUBCUTANEOUS WEEKLY
Qty: 4 ADJUSTABLE DOSE PRE-FILLED PEN SYRINGE | Refills: 5 | Status: SHIPPED | OUTPATIENT
Start: 2023-05-05

## 2023-05-05 RX ORDER — PIOGLITAZONEHYDROCHLORIDE 30 MG/1
30 TABLET ORAL DAILY
Qty: 90 TABLET | Refills: 3 | Status: SHIPPED | OUTPATIENT
Start: 2023-05-05

## 2023-05-05 RX ORDER — GABAPENTIN 600 MG/1
600 TABLET ORAL 2 TIMES DAILY
Qty: 60 TABLET | Refills: 3 | Status: SHIPPED | OUTPATIENT
Start: 2023-05-05 | End: 2023-06-04

## 2023-05-05 ASSESSMENT — PATIENT HEALTH QUESTIONNAIRE - PHQ9
SUM OF ALL RESPONSES TO PHQ9 QUESTIONS 1 & 2: 0
3. TROUBLE FALLING OR STAYING ASLEEP: 0
2. FEELING DOWN, DEPRESSED OR HOPELESS: 0
SUM OF ALL RESPONSES TO PHQ QUESTIONS 1-9: 0
10. IF YOU CHECKED OFF ANY PROBLEMS, HOW DIFFICULT HAVE THESE PROBLEMS MADE IT FOR YOU TO DO YOUR WORK, TAKE CARE OF THINGS AT HOME, OR GET ALONG WITH OTHER PEOPLE: 0
SUM OF ALL RESPONSES TO PHQ QUESTIONS 1-9: 0
8. MOVING OR SPEAKING SO SLOWLY THAT OTHER PEOPLE COULD HAVE NOTICED. OR THE OPPOSITE, BEING SO FIGETY OR RESTLESS THAT YOU HAVE BEEN MOVING AROUND A LOT MORE THAN USUAL: 0
1. LITTLE INTEREST OR PLEASURE IN DOING THINGS: 0
9. THOUGHTS THAT YOU WOULD BE BETTER OFF DEAD, OR OF HURTING YOURSELF: 0
4. FEELING TIRED OR HAVING LITTLE ENERGY: 0
6. FEELING BAD ABOUT YOURSELF - OR THAT YOU ARE A FAILURE OR HAVE LET YOURSELF OR YOUR FAMILY DOWN: 0
5. POOR APPETITE OR OVEREATING: 0
7. TROUBLE CONCENTRATING ON THINGS, SUCH AS READING THE NEWSPAPER OR WATCHING TELEVISION: 0
SUM OF ALL RESPONSES TO PHQ QUESTIONS 1-9: 0
SUM OF ALL RESPONSES TO PHQ QUESTIONS 1-9: 0

## 2023-05-05 ASSESSMENT — ENCOUNTER SYMPTOMS
ALLERGIC/IMMUNOLOGIC NEGATIVE: 1
GASTROINTESTINAL NEGATIVE: 1
EYES NEGATIVE: 1
RESPIRATORY NEGATIVE: 1

## 2023-05-05 NOTE — PROGRESS NOTES
SUBJECTIVE:    Patient ID: Malachi Marie is a 70 y.o.male. HPI:   Patient here for follow-up multiple medical problems  Patient is a 65-year-old male. He is morbid obese and diabetic. He takes oral medications only. A1c is well controlled. He takes sulfonylurea. Discuss Mounjaro as an alternative to help him lose weight and he is willing to try. He also have hypertension take blood pressure medicine. Blood pressure is well controlled. He is on statin therapy. He is compliant with therapy. He also takes ibuprofen for arthritis as needed. Medication is helpful. He have restless leg and neuropathy. Takes gabapentin. Medication is helpful. Denies medication side effect. He also have depression anxiety takes Zoloft. Medication is helpful. Denies medication side effect       Past Medical History:   Diagnosis Date    Anxiety     Benign essential HTN 7/10/2020    Depression     HTN (hypertension)     Hyperlipidemia 7/10/2020    Pneumonia     Type 2 diabetes mellitus (HCC) 5/2016      Current Outpatient Medications   Medication Sig Dispense Refill    pioglitazone (ACTOS) 30 MG tablet Take 1 tablet by mouth daily 90 tablet 3    gabapentin (NEURONTIN) 600 MG tablet Take 1 tablet by mouth 2 times daily for 30 days.  60 tablet 3    amLODIPine (NORVASC) 5 MG tablet Take 1 tablet by mouth nightly Take 1 tablet by mouth nightly 90 tablet 3    lisinopril-hydroCHLOROthiazide (PRINZIDE;ZESTORETIC) 20-25 MG per tablet Take 1 tablet by mouth once daily 90 tablet 3    sertraline (ZOLOFT) 100 MG tablet Take 1 tablet by mouth daily 90 tablet 3    ibuprofen (ADVIL;MOTRIN) 800 MG tablet Take 1 tablet by mouth every 8 hours as needed for Pain 90 tablet 3    Tirzepatide (MOUNJARO) 2.5 MG/0.5ML SOPN SC injection Inject 0.5 mLs into the skin once a week 4 Adjustable Dose Pre-filled Pen Syringe 5    rosuvastatin (CRESTOR) 10 MG tablet Take 1 tablet by mouth once daily 90 tablet 3    ipratropium-albuterol (DUONEB) 0.5-2.5 (3)

## 2023-05-10 DIAGNOSIS — E11.40 TYPE 2 DIABETES MELLITUS WITH DIABETIC NEUROPATHY, WITHOUT LONG-TERM CURRENT USE OF INSULIN (HCC): ICD-10-CM

## 2023-05-10 DIAGNOSIS — G25.81 RESTLESS LEG SYNDROME: ICD-10-CM

## 2023-05-10 RX ORDER — GABAPENTIN 600 MG/1
600 TABLET ORAL 2 TIMES DAILY
Qty: 60 TABLET | Refills: 3 | Status: SHIPPED | OUTPATIENT
Start: 2023-05-10 | End: 2023-06-09

## 2023-06-01 ENCOUNTER — TELEPHONE (OUTPATIENT)
Dept: PRIMARY CARE CLINIC | Age: 68
End: 2023-06-01

## 2023-06-11 ENCOUNTER — HOSPITAL ENCOUNTER (EMERGENCY)
Facility: HOSPITAL | Age: 68
Discharge: HOME | End: 2023-06-11
Attending: STUDENT IN AN ORGANIZED HEALTH CARE EDUCATION/TRAINING PROGRAM | Admitting: STUDENT IN AN ORGANIZED HEALTH CARE EDUCATION/TRAINING PROGRAM
Payer: MEDICARE

## 2023-06-11 VITALS
BODY MASS INDEX: 41.08 KG/M2 | OXYGEN SATURATION: 92 % | HEART RATE: 78 BPM | TEMPERATURE: 98.6 F | RESPIRATION RATE: 16 BRPM | SYSTOLIC BLOOD PRESSURE: 139 MMHG | HEIGHT: 73 IN | DIASTOLIC BLOOD PRESSURE: 71 MMHG | WEIGHT: 310 LBS

## 2023-06-11 DIAGNOSIS — R79.81 ABNORMAL PULSE OXIMETRY: Primary | ICD-10-CM

## 2023-06-11 PROCEDURE — 93005 ELECTROCARDIOGRAM TRACING: CPT | Performed by: STUDENT IN AN ORGANIZED HEALTH CARE EDUCATION/TRAINING PROGRAM

## 2023-06-11 PROCEDURE — 99284 EMERGENCY DEPT VISIT MOD MDM: CPT

## 2023-06-11 RX ORDER — GLIPIZIDE 10 MG/1
10 TABLET ORAL DAILY
COMMUNITY

## 2023-06-11 RX ORDER — PIOGLITAZONEHYDROCHLORIDE 30 MG/1
30 TABLET ORAL DAILY
COMMUNITY

## 2023-06-11 RX ORDER — GABAPENTIN 600 MG/1
600 TABLET ORAL 2 TIMES DAILY
COMMUNITY

## 2023-06-11 RX ORDER — LISINOPRIL AND HYDROCHLOROTHIAZIDE 25; 20 MG/1; MG/1
1 TABLET ORAL DAILY
COMMUNITY

## 2023-06-11 RX ORDER — ROSUVASTATIN CALCIUM 10 MG/1
10 TABLET, COATED ORAL DAILY
COMMUNITY

## 2023-06-11 RX ORDER — AMLODIPINE BESYLATE 5 MG/1
5 TABLET ORAL DAILY
COMMUNITY

## 2023-06-11 NOTE — DISCHARGE INSTRUCTIONS
Please go see your primary care doctor soon as possible for reexamination given that you had low oxygen levels after walking into the urgent care.  Come back here for fever, shortness of breath, cough, or persistent low oxygen level or if you feel you needs further testing.

## 2023-06-11 NOTE — ED PROVIDER NOTES
Subjective   History of Present Illness  Patient presents due to low oxygen levels.  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  He has to walk around all day with the chips and he feels very fatigued and exhausted at the end of the day and does not feel he can continue to do this.  He went to fast pace today to try to get a work excuse to modify at work duty but when he walked in his oxygen levels were low on a pulse oximeter.  He usually gets short of breath from walking so he sat took a few deep breaths and began to feel better but they had placed him on oxygen, got a chest x-ray, the chest x-ray was read as normal, and they sent him to the ER.  On my evaluation he is saturating 94 to 97% on room air at rest without any complaint of trouble breathing.  He had no acute symptoms today but instead was just trying to get a work excuse established today.  He does not feel he is having any acute medical emergency.  No chest pain.  No history of blood clots.  No leg pain or swelling.  No fevers.  No cough congestion or sore throat.  The shortness of breath he feels with exertion is entirely baseline which it has been for a year.    Review of Systems   Constitutional:  Negative for chills and fever.   Respiratory:  Positive for shortness of breath (with exertion only). Negative for cough.    Cardiovascular:  Negative for chest pain and palpitations.   Gastrointestinal:  Negative for abdominal pain and vomiting.   Genitourinary:  Negative for difficulty urinating and dysuria.   Neurological:  Negative for syncope and light-headedness.     Past Medical History:   Diagnosis Date    Diabetes mellitus     Hyperlipidemia     Hypertension        Allergies   Allergen Reactions    Aspirin Angioedema    Penicillins Myalgia       History reviewed. No pertinent surgical history.    History reviewed. No pertinent family history.    Social History      Socioeconomic History    Marital status:            Objective   Physical Exam  Vitals reviewed.   Constitutional:       General: He is not in acute distress.  HENT:      Head: Normocephalic and atraumatic.      Comments: No focal intraoral swelling.  No uvular deviation.  No posterior pharyngeal erythema or exudate.  No tonsillar exudate or focal tonsillar swelling.  Intraoral exam overall unremarkable.  Eyes:      Extraocular Movements: Extraocular movements intact.      Conjunctiva/sclera: Conjunctivae normal.   Cardiovascular:      Pulses: Normal pulses.      Heart sounds: Normal heart sounds.   Pulmonary:      Effort: Pulmonary effort is normal. No respiratory distress.      Breath sounds: No decreased breath sounds or wheezing.   Abdominal:      General: Abdomen is flat. There is no distension.   Musculoskeletal:      Cervical back: Normal range of motion and neck supple.      Right lower leg: No tenderness. No edema.      Left lower leg: No tenderness. No edema.   Skin:     General: Skin is warm and dry.   Neurological:      General: No focal deficit present.      Mental Status: He is alert. Mental status is at baseline.   Psychiatric:         Behavior: Behavior normal.         Thought Content: Thought content normal.       Procedures           ED Course  ED Course as of 06/11/23 1739   Sun Jun 11, 2023   1635 EKG: sinus rhythm, no focal ischemic changes, no arrhythmia. [AS]      ED Course User Index  [AS] Checo Tierney MD                                           Medical Decision Making    Kolby Michel is a 68 y.o. male with PMH above who presents to the Emergency Department with dyspnea on exertion, chronic, unchanged. Not hypoxic here.  He was not desiring to seek emergency medical care today but he did not feel comfortable refusing ambulance transport.  He feels well.  He has no acute symptoms.  He has a primary care doctor that he follows with.  I do find it notable that he  developed ambulatory hypoxia; I offered repeat chest x-ray since I cannot see the chest x-ray that was read as normal, laboratory studies, ambulatory sats.  He does not want to undergo any further testing and instead request discharge.  I find his request to stop working security shifts reasonable so I helped him fill out appropriate paperwork for this.  He agrees to follow-up with his doctor soon as possible regarding the ambulatory hypoxia detected today at the urgent care.  We discussed the benefits of testing here today including labs and chest x-ray and ambulatory sats, however given that he does not have concerns about an emergency medical condition and does not want to get further testing for this and does not have evidence of a true emergency but instead evidence of a chronic    Final diagnosis: abnormal pulse ox reading    All questions answered. Patient/family was understanding and in agreement with today's assessment and plan. The patient was monitored during their stay in the ED and dispositioned without acute event.    Electronically signed by:  Checo Tierney MD 6/11/2023 17:39 CDT      Note: Dragon medical dictation software was used in the creation of this note.      Final diagnoses:   Abnormal pulse oximetry       ED Disposition  ED Disposition       ED Disposition   Discharge    Condition   Stable    Comment   --               No follow-up provider specified.       Medication List      No changes were made to your prescriptions during this visit.            Checo Tierney MD  06/11/23 3958

## 2023-06-12 ENCOUNTER — TELEPHONE (OUTPATIENT)
Dept: FAMILY MEDICINE CLINIC | Facility: CLINIC | Age: 68
End: 2023-06-12
Payer: MEDICARE

## 2023-06-12 LAB
QT INTERVAL: 380 MS
QTC INTERVAL: 435 MS

## 2023-06-12 NOTE — TELEPHONE ENCOUNTER
Attempted to call patient to tell him about [facility]. He was recently seen in the ER and may need a PCP. Left a voicemail.

## 2023-09-05 DIAGNOSIS — E11.40 TYPE 2 DIABETES MELLITUS WITH DIABETIC NEUROPATHY, WITHOUT LONG-TERM CURRENT USE OF INSULIN (HCC): ICD-10-CM

## 2023-09-05 LAB
ALBUMIN SERPL-MCNC: 4.1 G/DL (ref 3.5–5.2)
ALP SERPL-CCNC: 60 U/L (ref 40–130)
ALT SERPL-CCNC: 12 U/L (ref 5–41)
ANION GAP SERPL CALCULATED.3IONS-SCNC: 11 MMOL/L (ref 7–19)
AST SERPL-CCNC: 22 U/L (ref 5–40)
BILIRUB SERPL-MCNC: 0.5 MG/DL (ref 0.2–1.2)
BILIRUB UR QL STRIP: NEGATIVE
BUN SERPL-MCNC: 26 MG/DL (ref 8–23)
CALCIUM SERPL-MCNC: 9.2 MG/DL (ref 8.8–10.2)
CHLORIDE SERPL-SCNC: 101 MMOL/L (ref 98–111)
CHOLEST SERPL-MCNC: 104 MG/DL (ref 160–199)
CLARITY UR: CLEAR
CO2 SERPL-SCNC: 29 MMOL/L (ref 22–29)
COLOR UR: YELLOW
CREAT SERPL-MCNC: 0.9 MG/DL (ref 0.5–1.2)
CREAT UR-MCNC: 152.3 MG/DL (ref 39–259)
ERYTHROCYTE [DISTWIDTH] IN BLOOD BY AUTOMATED COUNT: 16 % (ref 11.5–14.5)
GLUCOSE SERPL-MCNC: 57 MG/DL (ref 74–109)
GLUCOSE UR STRIP.AUTO-MCNC: NEGATIVE MG/DL
HBA1C MFR BLD: 6 % (ref 4–6)
HCT VFR BLD AUTO: 46.9 % (ref 42–52)
HDLC SERPL-MCNC: 41 MG/DL (ref 55–121)
HGB BLD-MCNC: 14.8 G/DL (ref 14–18)
HGB UR STRIP.AUTO-MCNC: NEGATIVE MG/L
KETONES UR STRIP.AUTO-MCNC: ABNORMAL MG/DL
LDLC SERPL CALC-MCNC: 38 MG/DL
LEUKOCYTE ESTERASE UR QL STRIP.AUTO: NEGATIVE
MCH RBC QN AUTO: 30.1 PG (ref 27–31)
MCHC RBC AUTO-ENTMCNC: 31.6 G/DL (ref 33–37)
MCV RBC AUTO: 95.5 FL (ref 80–94)
MICROALBUMIN UR-MCNC: <1.2 MG/DL (ref 0–19)
MICROALBUMIN/CREAT UR-RTO: NORMAL MG/G
NITRITE UR QL STRIP.AUTO: NEGATIVE
PH UR STRIP.AUTO: 5.5 [PH] (ref 5–8)
PLATELET # BLD AUTO: 196 K/UL (ref 130–400)
PMV BLD AUTO: 9.8 FL (ref 9.4–12.4)
POTASSIUM SERPL-SCNC: 4.2 MMOL/L (ref 3.5–5)
PROT SERPL-MCNC: 7.7 G/DL (ref 6.6–8.7)
PROT UR STRIP.AUTO-MCNC: NEGATIVE MG/DL
RBC # BLD AUTO: 4.91 M/UL (ref 4.7–6.1)
SODIUM SERPL-SCNC: 141 MMOL/L (ref 136–145)
SP GR UR STRIP.AUTO: 1.02 (ref 1–1.03)
TRIGL SERPL-MCNC: 124 MG/DL (ref 0–149)
UROBILINOGEN UR STRIP.AUTO-MCNC: 1 E.U./DL
WBC # BLD AUTO: 7.7 K/UL (ref 4.8–10.8)

## 2023-09-07 ENCOUNTER — TELEPHONE (OUTPATIENT)
Dept: FAMILY MEDICINE CLINIC | Age: 68
End: 2023-09-07

## 2023-09-07 NOTE — TELEPHONE ENCOUNTER
----- Message from Lamar Mason MD sent at 9/6/2023  9:39 AM CDT -----  Ok - within normal limits or stable for patient. Re check next due date. No

## 2023-09-11 ENCOUNTER — OFFICE VISIT (OUTPATIENT)
Dept: FAMILY MEDICINE CLINIC | Age: 68
End: 2023-09-11
Payer: MEDICARE

## 2023-09-11 VITALS
HEART RATE: 91 BPM | HEIGHT: 73 IN | WEIGHT: 315 LBS | DIASTOLIC BLOOD PRESSURE: 74 MMHG | TEMPERATURE: 97.8 F | SYSTOLIC BLOOD PRESSURE: 138 MMHG | OXYGEN SATURATION: 94 % | BODY MASS INDEX: 41.75 KG/M2

## 2023-09-11 VITALS
DIASTOLIC BLOOD PRESSURE: 74 MMHG | RESPIRATION RATE: 20 BRPM | WEIGHT: 315 LBS | TEMPERATURE: 97.8 F | BODY MASS INDEX: 41.75 KG/M2 | HEIGHT: 73 IN | HEART RATE: 91 BPM | SYSTOLIC BLOOD PRESSURE: 138 MMHG | OXYGEN SATURATION: 94 %

## 2023-09-11 DIAGNOSIS — Z00.00 MEDICARE ANNUAL WELLNESS VISIT, SUBSEQUENT: Primary | ICD-10-CM

## 2023-09-11 DIAGNOSIS — I10 BENIGN ESSENTIAL HTN: ICD-10-CM

## 2023-09-11 DIAGNOSIS — E78.5 HYPERLIPIDEMIA, UNSPECIFIED HYPERLIPIDEMIA TYPE: ICD-10-CM

## 2023-09-11 DIAGNOSIS — E11.40 TYPE 2 DIABETES MELLITUS WITH DIABETIC NEUROPATHY, WITHOUT LONG-TERM CURRENT USE OF INSULIN (HCC): Primary | ICD-10-CM

## 2023-09-11 PROCEDURE — 1036F TOBACCO NON-USER: CPT | Performed by: FAMILY MEDICINE

## 2023-09-11 PROCEDURE — 3078F DIAST BP <80 MM HG: CPT | Performed by: FAMILY MEDICINE

## 2023-09-11 PROCEDURE — G0439 PPPS, SUBSEQ VISIT: HCPCS | Performed by: FAMILY MEDICINE

## 2023-09-11 PROCEDURE — G8427 DOCREV CUR MEDS BY ELIG CLIN: HCPCS | Performed by: FAMILY MEDICINE

## 2023-09-11 PROCEDURE — G8417 CALC BMI ABV UP PARAM F/U: HCPCS | Performed by: FAMILY MEDICINE

## 2023-09-11 PROCEDURE — 3017F COLORECTAL CA SCREEN DOC REV: CPT | Performed by: FAMILY MEDICINE

## 2023-09-11 PROCEDURE — 2022F DILAT RTA XM EVC RTNOPTHY: CPT | Performed by: FAMILY MEDICINE

## 2023-09-11 PROCEDURE — 99214 OFFICE O/P EST MOD 30 MIN: CPT | Performed by: FAMILY MEDICINE

## 2023-09-11 PROCEDURE — 3075F SYST BP GE 130 - 139MM HG: CPT | Performed by: FAMILY MEDICINE

## 2023-09-11 PROCEDURE — 1123F ACP DISCUSS/DSCN MKR DOCD: CPT | Performed by: FAMILY MEDICINE

## 2023-09-11 PROCEDURE — 3044F HG A1C LEVEL LT 7.0%: CPT | Performed by: FAMILY MEDICINE

## 2023-09-11 RX ORDER — SEMAGLUTIDE 1.34 MG/ML
INJECTION, SOLUTION SUBCUTANEOUS
Qty: 1 ADJUSTABLE DOSE PRE-FILLED PEN SYRINGE | Refills: 5 | Status: SHIPPED | OUTPATIENT
Start: 2023-09-11

## 2023-09-11 RX ORDER — GLIPIZIDE 10 MG/1
10 TABLET ORAL
COMMUNITY
End: 2023-09-11

## 2023-09-11 SDOH — ECONOMIC STABILITY: INCOME INSECURITY: HOW HARD IS IT FOR YOU TO PAY FOR THE VERY BASICS LIKE FOOD, HOUSING, MEDICAL CARE, AND HEATING?: NOT HARD AT ALL

## 2023-09-11 SDOH — ECONOMIC STABILITY: FOOD INSECURITY: WITHIN THE PAST 12 MONTHS, YOU WORRIED THAT YOUR FOOD WOULD RUN OUT BEFORE YOU GOT MONEY TO BUY MORE.: NEVER TRUE

## 2023-09-11 SDOH — ECONOMIC STABILITY: HOUSING INSECURITY
IN THE LAST 12 MONTHS, WAS THERE A TIME WHEN YOU DID NOT HAVE A STEADY PLACE TO SLEEP OR SLEPT IN A SHELTER (INCLUDING NOW)?: NO

## 2023-09-11 SDOH — ECONOMIC STABILITY: FOOD INSECURITY: WITHIN THE PAST 12 MONTHS, THE FOOD YOU BOUGHT JUST DIDN'T LAST AND YOU DIDN'T HAVE MONEY TO GET MORE.: NEVER TRUE

## 2023-09-11 ASSESSMENT — PATIENT HEALTH QUESTIONNAIRE - PHQ9
3. TROUBLE FALLING OR STAYING ASLEEP: 0
3. TROUBLE FALLING OR STAYING ASLEEP: 0
SUM OF ALL RESPONSES TO PHQ QUESTIONS 1-9: 0
4. FEELING TIRED OR HAVING LITTLE ENERGY: 0
5. POOR APPETITE OR OVEREATING: 0
SUM OF ALL RESPONSES TO PHQ QUESTIONS 1-9: 0
8. MOVING OR SPEAKING SO SLOWLY THAT OTHER PEOPLE COULD HAVE NOTICED. OR THE OPPOSITE, BEING SO FIGETY OR RESTLESS THAT YOU HAVE BEEN MOVING AROUND A LOT MORE THAN USUAL: 0
7. TROUBLE CONCENTRATING ON THINGS, SUCH AS READING THE NEWSPAPER OR WATCHING TELEVISION: 0
1. LITTLE INTEREST OR PLEASURE IN DOING THINGS: 0
2. FEELING DOWN, DEPRESSED OR HOPELESS: 0
10. IF YOU CHECKED OFF ANY PROBLEMS, HOW DIFFICULT HAVE THESE PROBLEMS MADE IT FOR YOU TO DO YOUR WORK, TAKE CARE OF THINGS AT HOME, OR GET ALONG WITH OTHER PEOPLE: 0
SUM OF ALL RESPONSES TO PHQ QUESTIONS 1-9: 0
SUM OF ALL RESPONSES TO PHQ QUESTIONS 1-9: 0
4. FEELING TIRED OR HAVING LITTLE ENERGY: 0
1. LITTLE INTEREST OR PLEASURE IN DOING THINGS: 0
9. THOUGHTS THAT YOU WOULD BE BETTER OFF DEAD, OR OF HURTING YOURSELF: 0
6. FEELING BAD ABOUT YOURSELF - OR THAT YOU ARE A FAILURE OR HAVE LET YOURSELF OR YOUR FAMILY DOWN: 0
SUM OF ALL RESPONSES TO PHQ QUESTIONS 1-9: 0
2. FEELING DOWN, DEPRESSED OR HOPELESS: 0
SUM OF ALL RESPONSES TO PHQ QUESTIONS 1-9: 0
7. TROUBLE CONCENTRATING ON THINGS, SUCH AS READING THE NEWSPAPER OR WATCHING TELEVISION: 0
10. IF YOU CHECKED OFF ANY PROBLEMS, HOW DIFFICULT HAVE THESE PROBLEMS MADE IT FOR YOU TO DO YOUR WORK, TAKE CARE OF THINGS AT HOME, OR GET ALONG WITH OTHER PEOPLE: 0
9. THOUGHTS THAT YOU WOULD BE BETTER OFF DEAD, OR OF HURTING YOURSELF: 0
SUM OF ALL RESPONSES TO PHQ9 QUESTIONS 1 & 2: 0
5. POOR APPETITE OR OVEREATING: 0
6. FEELING BAD ABOUT YOURSELF - OR THAT YOU ARE A FAILURE OR HAVE LET YOURSELF OR YOUR FAMILY DOWN: 0
SUM OF ALL RESPONSES TO PHQ9 QUESTIONS 1 & 2: 0
SUM OF ALL RESPONSES TO PHQ QUESTIONS 1-9: 0
SUM OF ALL RESPONSES TO PHQ QUESTIONS 1-9: 0
8. MOVING OR SPEAKING SO SLOWLY THAT OTHER PEOPLE COULD HAVE NOTICED. OR THE OPPOSITE, BEING SO FIGETY OR RESTLESS THAT YOU HAVE BEEN MOVING AROUND A LOT MORE THAN USUAL: 0

## 2023-09-11 ASSESSMENT — ENCOUNTER SYMPTOMS
GASTROINTESTINAL NEGATIVE: 1
ALLERGIC/IMMUNOLOGIC NEGATIVE: 1
EYES NEGATIVE: 1
RESPIRATORY NEGATIVE: 1

## 2023-09-11 ASSESSMENT — LIFESTYLE VARIABLES: HOW OFTEN DO YOU HAVE A DRINK CONTAINING ALCOHOL: NEVER

## 2023-09-11 NOTE — PROGRESS NOTES
difficulty. Allergies   Allergen Reactions    Latex     Aspirin     Penicillins      Prior to Visit Medications    Medication Sig Taking? Authorizing Provider   Semaglutide,0.25 or 0.5MG/DOS, (OZEMPIC, 0.25 OR 0.5 MG/DOSE,) 2 MG/1.5ML SOPN 0.25 mg sc weekly x 4 weeks then increase to 0.5 mg sc weekly Yes Owen Colvin MD   gabapentin (NEURONTIN) 600 MG tablet Take 1 tablet by mouth 2 times daily for 30 days. Yes Owen Colvin MD   pioglitazone (ACTOS) 30 MG tablet Take 1 tablet by mouth daily Yes Owen Colvin MD   amLODIPine (NORVASC) 5 MG tablet Take 1 tablet by mouth nightly Take 1 tablet by mouth nightly Yes Owen Colvin MD   lisinopril-hydroCHLOROthiazide (PRINZIDE;ZESTORETIC) 20-25 MG per tablet Take 1 tablet by mouth once daily Yes Owen Colvin MD   sertraline (ZOLOFT) 100 MG tablet Take 1 tablet by mouth daily Yes Owen Colvin MD   ibuprofen (ADVIL;MOTRIN) 800 MG tablet Take 1 tablet by mouth every 8 hours as needed for Pain Yes Owen Colvin MD   rosuvastatin (CRESTOR) 10 MG tablet Take 1 tablet by mouth once daily Yes Owen Colvin MD   ipratropium-albuterol (DUONEB) 0.5-2.5 (3) MG/3ML SOLN nebulizer solution Inhale 3 mLs into the lungs every 4 hours as needed for Shortness of Breath or Other (wheezing)  Owen Colvin MD       [facility] (Including outside providers/suppliers regularly involved in providing care):   Patient Care Team:  Owen Colvin MD as PCP - General (Family Medicine)  Owen Colvin MD as PCP - Empaneled Provider     Reviewed and updated this visit:  Tobacco  Allergies  Meds  Med Hx  Surg Hx  Soc Hx  Fam Hx        Lab Work was done 9/5/2023. Health Maintenance is reviewed with the patient and updated. Cirilo Patel LPN, [birth year]/33/[birth year]68, performed the documented evaluation under the direct supervision of the attending physician.

## 2023-09-20 DIAGNOSIS — E11.40 TYPE 2 DIABETES MELLITUS WITH DIABETIC NEUROPATHY, WITHOUT LONG-TERM CURRENT USE OF INSULIN (HCC): Primary | ICD-10-CM

## 2023-12-13 DIAGNOSIS — G25.81 RESTLESS LEG SYNDROME: ICD-10-CM

## 2023-12-13 DIAGNOSIS — E11.40 TYPE 2 DIABETES MELLITUS WITH DIABETIC NEUROPATHY, WITHOUT LONG-TERM CURRENT USE OF INSULIN (HCC): ICD-10-CM

## 2023-12-14 RX ORDER — GABAPENTIN 600 MG/1
600 TABLET ORAL 2 TIMES DAILY
Qty: 60 TABLET | Refills: 2 | Status: SHIPPED | OUTPATIENT
Start: 2023-12-14 | End: 2024-03-13

## 2024-01-14 ASSESSMENT — PATIENT HEALTH QUESTIONNAIRE - PHQ9
SUM OF ALL RESPONSES TO PHQ QUESTIONS 1-9: 0
5. POOR APPETITE OR OVEREATING: NOT AT ALL
9. THOUGHTS THAT YOU WOULD BE BETTER OFF DEAD, OR OF HURTING YOURSELF: NOT AT ALL
8. MOVING OR SPEAKING SO SLOWLY THAT OTHER PEOPLE COULD HAVE NOTICED. OR THE OPPOSITE - BEING SO FIDGETY OR RESTLESS THAT YOU HAVE BEEN MOVING AROUND A LOT MORE THAN USUAL: NOT AT ALL
1. LITTLE INTEREST OR PLEASURE IN DOING THINGS: NOT AT ALL
7. TROUBLE CONCENTRATING ON THINGS, SUCH AS READING THE NEWSPAPER OR WATCHING TELEVISION: NOT AT ALL
SUM OF ALL RESPONSES TO PHQ QUESTIONS 1-9: 0
SUM OF ALL RESPONSES TO PHQ9 QUESTIONS 1 & 2: 0
6. FEELING BAD ABOUT YOURSELF - OR THAT YOU ARE A FAILURE OR HAVE LET YOURSELF OR YOUR FAMILY DOWN: NOT AT ALL
5. POOR APPETITE OR OVEREATING: 0
3. TROUBLE FALLING OR STAYING ASLEEP: NOT AT ALL
3. TROUBLE FALLING OR STAYING ASLEEP: 0
1. LITTLE INTEREST OR PLEASURE IN DOING THINGS: 0
8. MOVING OR SPEAKING SO SLOWLY THAT OTHER PEOPLE COULD HAVE NOTICED. OR THE OPPOSITE, BEING SO FIGETY OR RESTLESS THAT YOU HAVE BEEN MOVING AROUND A LOT MORE THAN USUAL: 0
10. IF YOU CHECKED OFF ANY PROBLEMS, HOW DIFFICULT HAVE THESE PROBLEMS MADE IT FOR YOU TO DO YOUR WORK, TAKE CARE OF THINGS AT HOME, OR GET ALONG WITH OTHER PEOPLE: NOT DIFFICULT AT ALL
4. FEELING TIRED OR HAVING LITTLE ENERGY: 0
SUM OF ALL RESPONSES TO PHQ QUESTIONS 1-9: 0
2. FEELING DOWN, DEPRESSED OR HOPELESS: 0
10. IF YOU CHECKED OFF ANY PROBLEMS, HOW DIFFICULT HAVE THESE PROBLEMS MADE IT FOR YOU TO DO YOUR WORK, TAKE CARE OF THINGS AT HOME, OR GET ALONG WITH OTHER PEOPLE: 0
4. FEELING TIRED OR HAVING LITTLE ENERGY: NOT AT ALL
9. THOUGHTS THAT YOU WOULD BE BETTER OFF DEAD, OR OF HURTING YOURSELF: 0
SUM OF ALL RESPONSES TO PHQ QUESTIONS 1-9: 0
6. FEELING BAD ABOUT YOURSELF - OR THAT YOU ARE A FAILURE OR HAVE LET YOURSELF OR YOUR FAMILY DOWN: 0
2. FEELING DOWN, DEPRESSED OR HOPELESS: NOT AT ALL
7. TROUBLE CONCENTRATING ON THINGS, SUCH AS READING THE NEWSPAPER OR WATCHING TELEVISION: 0
SUM OF ALL RESPONSES TO PHQ QUESTIONS 1-9: 0

## 2024-01-15 DIAGNOSIS — E11.40 TYPE 2 DIABETES MELLITUS WITH DIABETIC NEUROPATHY, WITHOUT LONG-TERM CURRENT USE OF INSULIN (HCC): ICD-10-CM

## 2024-01-15 LAB
ALBUMIN SERPL-MCNC: 3.9 G/DL (ref 3.5–5.2)
ALP SERPL-CCNC: 66 U/L (ref 40–130)
ALT SERPL-CCNC: 16 U/L (ref 5–41)
ANION GAP SERPL CALCULATED.3IONS-SCNC: 12 MMOL/L (ref 7–19)
AST SERPL-CCNC: 20 U/L (ref 5–40)
BILIRUB SERPL-MCNC: 0.4 MG/DL (ref 0.2–1.2)
BILIRUB UR QL STRIP: ABNORMAL
BUN SERPL-MCNC: 21 MG/DL (ref 8–23)
CALCIUM SERPL-MCNC: 9.4 MG/DL (ref 8.8–10.2)
CHLORIDE SERPL-SCNC: 99 MMOL/L (ref 98–111)
CHOLEST SERPL-MCNC: 98 MG/DL (ref 160–199)
CLARITY UR: ABNORMAL
CO2 SERPL-SCNC: 28 MMOL/L (ref 22–29)
COLOR UR: ABNORMAL
CREAT SERPL-MCNC: 0.8 MG/DL (ref 0.5–1.2)
CREAT UR-MCNC: 301.8 MG/DL (ref 39–259)
ERYTHROCYTE [DISTWIDTH] IN BLOOD BY AUTOMATED COUNT: 16.6 % (ref 11.5–14.5)
GLUCOSE SERPL-MCNC: 106 MG/DL (ref 74–109)
GLUCOSE UR STRIP.AUTO-MCNC: NEGATIVE MG/DL
HBA1C MFR BLD: 8.2 % (ref 4–6)
HCT VFR BLD AUTO: 48.6 % (ref 42–52)
HDLC SERPL-MCNC: 40 MG/DL (ref 55–121)
HGB BLD-MCNC: 15.4 G/DL (ref 14–18)
HGB UR STRIP.AUTO-MCNC: NEGATIVE MG/L
KETONES UR STRIP.AUTO-MCNC: NEGATIVE MG/DL
LDLC SERPL CALC-MCNC: 41 MG/DL
LEUKOCYTE ESTERASE UR QL STRIP.AUTO: NEGATIVE
MCH RBC QN AUTO: 29.4 PG (ref 27–31)
MCHC RBC AUTO-ENTMCNC: 31.7 G/DL (ref 33–37)
MCV RBC AUTO: 92.7 FL (ref 80–94)
MICROALBUMIN UR-MCNC: 3.2 MG/DL (ref 0–19)
MICROALBUMIN/CREAT UR-RTO: 10.6 MG/G
NITRITE UR QL STRIP.AUTO: NEGATIVE
PH UR STRIP.AUTO: 5.5 [PH] (ref 5–8)
PLATELET # BLD AUTO: 207 K/UL (ref 130–400)
PMV BLD AUTO: 9.9 FL (ref 9.4–12.4)
POTASSIUM SERPL-SCNC: 4.6 MMOL/L (ref 3.5–5)
PROT SERPL-MCNC: 7.5 G/DL (ref 6.6–8.7)
PROT UR STRIP.AUTO-MCNC: ABNORMAL MG/DL
RBC # BLD AUTO: 5.24 M/UL (ref 4.7–6.1)
SODIUM SERPL-SCNC: 139 MMOL/L (ref 136–145)
SP GR UR STRIP.AUTO: 1.02 (ref 1–1.03)
TRIGL SERPL-MCNC: 85 MG/DL (ref 0–149)
UROBILINOGEN UR STRIP.AUTO-MCNC: 1 E.U./DL
WBC # BLD AUTO: 7.4 K/UL (ref 4.8–10.8)

## 2024-01-16 ENCOUNTER — OFFICE VISIT (OUTPATIENT)
Dept: FAMILY MEDICINE CLINIC | Age: 69
End: 2024-01-16
Payer: MEDICARE

## 2024-01-16 VITALS
BODY MASS INDEX: 38.97 KG/M2 | WEIGHT: 294 LBS | OXYGEN SATURATION: 98 % | SYSTOLIC BLOOD PRESSURE: 128 MMHG | TEMPERATURE: 97.4 F | HEART RATE: 94 BPM | HEIGHT: 73 IN | DIASTOLIC BLOOD PRESSURE: 76 MMHG

## 2024-01-16 DIAGNOSIS — Z23 NEED FOR INFLUENZA VACCINATION: ICD-10-CM

## 2024-01-16 DIAGNOSIS — E78.5 HYPERLIPIDEMIA, UNSPECIFIED HYPERLIPIDEMIA TYPE: ICD-10-CM

## 2024-01-16 DIAGNOSIS — I10 BENIGN ESSENTIAL HTN: ICD-10-CM

## 2024-01-16 DIAGNOSIS — E11.40 TYPE 2 DIABETES MELLITUS WITH DIABETIC NEUROPATHY, WITHOUT LONG-TERM CURRENT USE OF INSULIN (HCC): Primary | ICD-10-CM

## 2024-01-16 DIAGNOSIS — Z23 NEED FOR PNEUMOCOCCAL 20-VALENT CONJUGATE VACCINATION: ICD-10-CM

## 2024-01-16 PROCEDURE — 99214 OFFICE O/P EST MOD 30 MIN: CPT | Performed by: FAMILY MEDICINE

## 2024-01-16 PROCEDURE — 3074F SYST BP LT 130 MM HG: CPT | Performed by: FAMILY MEDICINE

## 2024-01-16 PROCEDURE — 90694 VACC AIIV4 NO PRSRV 0.5ML IM: CPT | Performed by: FAMILY MEDICINE

## 2024-01-16 PROCEDURE — G0008 ADMIN INFLUENZA VIRUS VAC: HCPCS | Performed by: FAMILY MEDICINE

## 2024-01-16 PROCEDURE — 3078F DIAST BP <80 MM HG: CPT | Performed by: FAMILY MEDICINE

## 2024-01-16 PROCEDURE — 90677 PCV20 VACCINE IM: CPT | Performed by: FAMILY MEDICINE

## 2024-01-16 PROCEDURE — G8417 CALC BMI ABV UP PARAM F/U: HCPCS | Performed by: FAMILY MEDICINE

## 2024-01-16 PROCEDURE — 2022F DILAT RTA XM EVC RTNOPTHY: CPT | Performed by: FAMILY MEDICINE

## 2024-01-16 PROCEDURE — 1123F ACP DISCUSS/DSCN MKR DOCD: CPT | Performed by: FAMILY MEDICINE

## 2024-01-16 PROCEDURE — G8484 FLU IMMUNIZE NO ADMIN: HCPCS | Performed by: FAMILY MEDICINE

## 2024-01-16 PROCEDURE — G0009 ADMIN PNEUMOCOCCAL VACCINE: HCPCS | Performed by: FAMILY MEDICINE

## 2024-01-16 PROCEDURE — 3017F COLORECTAL CA SCREEN DOC REV: CPT | Performed by: FAMILY MEDICINE

## 2024-01-16 PROCEDURE — 3052F HG A1C>EQUAL 8.0%<EQUAL 9.0%: CPT | Performed by: FAMILY MEDICINE

## 2024-01-16 PROCEDURE — G8427 DOCREV CUR MEDS BY ELIG CLIN: HCPCS | Performed by: FAMILY MEDICINE

## 2024-01-16 PROCEDURE — 1036F TOBACCO NON-USER: CPT | Performed by: FAMILY MEDICINE

## 2024-01-16 ASSESSMENT — ENCOUNTER SYMPTOMS
ALLERGIC/IMMUNOLOGIC NEGATIVE: 1
RESPIRATORY NEGATIVE: 1
EYES NEGATIVE: 1
GASTROINTESTINAL NEGATIVE: 1

## 2024-01-16 NOTE — PROGRESS NOTES
SUBJECTIVE:    Patient ID: Sae Wilson is a 69 y.o.male.    HPI:   Patient here for follow-up of multiple medical problems  Patient is a 77-year-old male.  He has past medical history significant for diabetes.  He is not on GLP-1.  A1c have gone up but he was using a sulfonylurea that was causing hypoglycemia before.  He also have some weight loss.  A1c is elevated to 8.  He also have hypertension hyperlipidemia.  Blood pressure is well-controlled.  Denies medication side effect.  He is on cholesterol medication.  He is compliant with therapy.  Denies medication side effect.  Health maintenance  Patient is due for influenza and pneumonia vaccine       Past Medical History:   Diagnosis Date   • Anxiety    • Benign essential HTN 7/10/2020   • Depression    • HTN (hypertension)    • Hyperlipidemia 7/10/2020   • Pneumonia    • Type 2 diabetes mellitus (HCC) 5/2016      Current Outpatient Medications   Medication Sig Dispense Refill   • Semaglutide, 1 MG/DOSE, 2 MG/1.5ML SOPN Inject 1 mg into the skin once a week 1 Adjustable Dose Pre-filled Pen Syringe 5   • gabapentin (NEURONTIN) 600 MG tablet Take 1 tablet by mouth 2 times daily for 90 days. Max Daily Amount: 1,200 mg 60 tablet 2   • pioglitazone (ACTOS) 30 MG tablet Take 1 tablet by mouth daily 90 tablet 3   • amLODIPine (NORVASC) 5 MG tablet Take 1 tablet by mouth nightly Take 1 tablet by mouth nightly 90 tablet 3   • lisinopril-hydroCHLOROthiazide (PRINZIDE;ZESTORETIC) 20-25 MG per tablet Take 1 tablet by mouth once daily 90 tablet 3   • sertraline (ZOLOFT) 100 MG tablet Take 1 tablet by mouth daily 90 tablet 3   • ibuprofen (ADVIL;MOTRIN) 800 MG tablet Take 1 tablet by mouth every 8 hours as needed for Pain 90 tablet 3   • rosuvastatin (CRESTOR) 10 MG tablet Take 1 tablet by mouth once daily 90 tablet 3   • ipratropium-albuterol (DUONEB) 0.5-2.5 (3) MG/3ML SOLN nebulizer solution Inhale 3 mLs into the lungs every 4 hours as needed for Shortness of Breath or Other

## 2024-01-16 NOTE — PROGRESS NOTES
After obtaining consent, and per orders of Dr. Figueroa, injection of Flu vaccine given in Right deltoid by Keke Gaston MA. Patient instructed to remain in clinic for 20 minutes afterwards, and to report any adverse reaction to me immediately.     After obtaining consent, and per orders of Dr. Figueroa, injection of Prevnar 20 given in Left deltoid by Keke Gaston MA. Patient instructed to remain in clinic for 20 minutes afterwards, and to report any adverse reaction to me immediately.

## 2024-03-11 ENCOUNTER — HOSPITAL ENCOUNTER (INPATIENT)
Facility: HOSPITAL | Age: 69
LOS: 8 days | Discharge: HOME | DRG: 189 | End: 2024-03-19
Attending: FAMILY MEDICINE | Admitting: INTERNAL MEDICINE
Payer: MEDICARE

## 2024-03-11 DIAGNOSIS — Z74.09 IMPAIRED MOBILITY: ICD-10-CM

## 2024-03-11 DIAGNOSIS — E66.9 OBESITY (BMI 30-39.9): Chronic | ICD-10-CM

## 2024-03-11 DIAGNOSIS — R13.10 DYSPHAGIA, UNSPECIFIED TYPE: Primary | ICD-10-CM

## 2024-03-11 DIAGNOSIS — J96.01 ACUTE RESPIRATORY FAILURE WITH HYPOXEMIA: ICD-10-CM

## 2024-03-11 LAB
ANION GAP SERPL CALCULATED.3IONS-SCNC: 12 MMOL/L (ref 5–15)
ARTERIAL PATENCY WRIST A: POSITIVE
ATMOSPHERIC PRESS: 753 MMHG
BASE EXCESS BLDA CALC-SCNC: 4.3 MMOL/L (ref 0–2)
BASOPHILS # BLD AUTO: 0.06 10*3/MM3 (ref 0–0.2)
BASOPHILS NFR BLD AUTO: 0.3 % (ref 0–1.5)
BDY SITE: ABNORMAL
BODY TEMPERATURE: 37
BUN SERPL-MCNC: 29 MG/DL (ref 8–23)
BUN/CREAT SERPL: 15 (ref 7–25)
CALCIUM SPEC-SCNC: 8.7 MG/DL (ref 8.6–10.5)
CHLORIDE SERPL-SCNC: 94 MMOL/L (ref 98–107)
CO2 SERPL-SCNC: 27 MMOL/L (ref 22–29)
CREAT SERPL-MCNC: 1.93 MG/DL (ref 0.76–1.27)
D-LACTATE SERPL-SCNC: 0.9 MMOL/L (ref 0.5–2)
DEPRECATED RDW RBC AUTO: 51.8 FL (ref 37–54)
EGFRCR SERPLBLD CKD-EPI 2021: 36.8 ML/MIN/1.73
EOSINOPHIL # BLD AUTO: 0 10*3/MM3 (ref 0–0.4)
EOSINOPHIL NFR BLD AUTO: 0 % (ref 0.3–6.2)
ERYTHROCYTE [DISTWIDTH] IN BLOOD BY AUTOMATED COUNT: 15.7 % (ref 12.3–15.4)
GAS FLOW AIRWAY: 10 LPM
GLUCOSE BLDC GLUCOMTR-MCNC: 138 MG/DL (ref 70–130)
GLUCOSE BLDC GLUCOMTR-MCNC: 207 MG/DL (ref 70–130)
GLUCOSE SERPL-MCNC: 216 MG/DL (ref 65–99)
HBA1C MFR BLD: 8.1 % (ref 4.8–5.6)
HCO3 BLDA-SCNC: 29.3 MMOL/L (ref 20–26)
HCT VFR BLD AUTO: 38.8 % (ref 37.5–51)
HGB BLD-MCNC: 12.8 G/DL (ref 13–17.7)
IMM GRANULOCYTES # BLD AUTO: 0.21 10*3/MM3 (ref 0–0.05)
IMM GRANULOCYTES NFR BLD AUTO: 1.1 % (ref 0–0.5)
LYMPHOCYTES # BLD AUTO: 0.51 10*3/MM3 (ref 0.7–3.1)
LYMPHOCYTES NFR BLD AUTO: 2.7 % (ref 19.6–45.3)
Lab: ABNORMAL
MAGNESIUM SERPL-MCNC: 1.8 MG/DL (ref 1.6–2.4)
MCH RBC QN AUTO: 29.5 PG (ref 26.6–33)
MCHC RBC AUTO-ENTMCNC: 33 G/DL (ref 31.5–35.7)
MCV RBC AUTO: 89.4 FL (ref 79–97)
MODALITY: ABNORMAL
MONOCYTES # BLD AUTO: 1.56 10*3/MM3 (ref 0.1–0.9)
MONOCYTES NFR BLD AUTO: 8.4 % (ref 5–12)
NEUTROPHILS NFR BLD AUTO: 16.26 10*3/MM3 (ref 1.7–7)
NEUTROPHILS NFR BLD AUTO: 87.5 % (ref 42.7–76)
NRBC BLD AUTO-RTO: 0 /100 WBC (ref 0–0.2)
PCO2 BLDA: 44.6 MM HG (ref 35–45)
PCO2 TEMP ADJ BLD: 44.6 MM HG (ref 35–45)
PH BLDA: 7.43 PH UNITS (ref 7.35–7.45)
PH, TEMP CORRECTED: 7.43 PH UNITS (ref 7.35–7.45)
PLATELET # BLD AUTO: 157 10*3/MM3 (ref 140–450)
PMV BLD AUTO: 9.1 FL (ref 6–12)
PO2 BLDA: 70 MM HG (ref 83–108)
PO2 TEMP ADJ BLD: 70 MM HG (ref 83–108)
POTASSIUM SERPL-SCNC: 4.1 MMOL/L (ref 3.5–5.2)
RBC # BLD AUTO: 4.34 10*6/MM3 (ref 4.14–5.8)
SAO2 % BLDCOA: 94.6 % (ref 94–99)
SODIUM SERPL-SCNC: 133 MMOL/L (ref 136–145)
VENTILATOR MODE: ABNORMAL
WBC NRBC COR # BLD AUTO: 18.6 10*3/MM3 (ref 3.4–10.8)

## 2024-03-11 PROCEDURE — 94761 N-INVAS EAR/PLS OXIMETRY MLT: CPT

## 2024-03-11 PROCEDURE — 63710000001 INSULIN REGULAR HUMAN PER 5 UNITS: Performed by: FAMILY MEDICINE

## 2024-03-11 PROCEDURE — 36600 WITHDRAWAL OF ARTERIAL BLOOD: CPT

## 2024-03-11 PROCEDURE — 25010000002 CEFTRIAXONE PER 250 MG: Performed by: FAMILY MEDICINE

## 2024-03-11 PROCEDURE — 82948 REAGENT STRIP/BLOOD GLUCOSE: CPT

## 2024-03-11 PROCEDURE — 86738 MYCOPLASMA ANTIBODY: CPT | Performed by: FAMILY MEDICINE

## 2024-03-11 PROCEDURE — 83735 ASSAY OF MAGNESIUM: CPT | Performed by: FAMILY MEDICINE

## 2024-03-11 PROCEDURE — 93010 ELECTROCARDIOGRAM REPORT: CPT | Performed by: INTERNAL MEDICINE

## 2024-03-11 PROCEDURE — 25010000002 HEPARIN (PORCINE) PER 1000 UNITS: Performed by: FAMILY MEDICINE

## 2024-03-11 PROCEDURE — 94799 UNLISTED PULMONARY SVC/PX: CPT

## 2024-03-11 PROCEDURE — 82803 BLOOD GASES ANY COMBINATION: CPT

## 2024-03-11 PROCEDURE — 83036 HEMOGLOBIN GLYCOSYLATED A1C: CPT | Performed by: FAMILY MEDICINE

## 2024-03-11 PROCEDURE — 85025 COMPLETE CBC W/AUTO DIFF WBC: CPT | Performed by: FAMILY MEDICINE

## 2024-03-11 PROCEDURE — 25810000003 SODIUM CHLORIDE 0.9 % SOLUTION: Performed by: FAMILY MEDICINE

## 2024-03-11 PROCEDURE — 87040 BLOOD CULTURE FOR BACTERIA: CPT | Performed by: FAMILY MEDICINE

## 2024-03-11 PROCEDURE — 83605 ASSAY OF LACTIC ACID: CPT | Performed by: FAMILY MEDICINE

## 2024-03-11 PROCEDURE — 80048 BASIC METABOLIC PNL TOTAL CA: CPT | Performed by: FAMILY MEDICINE

## 2024-03-11 PROCEDURE — 93005 ELECTROCARDIOGRAM TRACING: CPT | Performed by: FAMILY MEDICINE

## 2024-03-11 RX ORDER — BISACODYL 5 MG/1
5 TABLET, DELAYED RELEASE ORAL DAILY PRN
Status: DISCONTINUED | OUTPATIENT
Start: 2024-03-11 | End: 2024-03-19 | Stop reason: HOSPADM

## 2024-03-11 RX ORDER — SODIUM CHLORIDE 0.9 % (FLUSH) 0.9 %
10 SYRINGE (ML) INJECTION EVERY 12 HOURS SCHEDULED
Status: DISCONTINUED | OUTPATIENT
Start: 2024-03-11 | End: 2024-03-19 | Stop reason: HOSPADM

## 2024-03-11 RX ORDER — ONDANSETRON 2 MG/ML
4 INJECTION INTRAMUSCULAR; INTRAVENOUS EVERY 6 HOURS PRN
Status: DISCONTINUED | OUTPATIENT
Start: 2024-03-11 | End: 2024-03-19 | Stop reason: HOSPADM

## 2024-03-11 RX ORDER — ACETAMINOPHEN 325 MG/1
650 TABLET ORAL EVERY 4 HOURS PRN
Status: DISCONTINUED | OUTPATIENT
Start: 2024-03-11 | End: 2024-03-19 | Stop reason: HOSPADM

## 2024-03-11 RX ORDER — LANOLIN ALCOHOL/MO/W.PET/CERES
6 CREAM (GRAM) TOPICAL NIGHTLY PRN
Status: DISCONTINUED | OUTPATIENT
Start: 2024-03-11 | End: 2024-03-19 | Stop reason: HOSPADM

## 2024-03-11 RX ORDER — HYDROCODONE BITARTRATE AND ACETAMINOPHEN 5; 325 MG/1; MG/1
1 TABLET ORAL EVERY 4 HOURS PRN
Status: ACTIVE | OUTPATIENT
Start: 2024-03-11 | End: 2024-03-16

## 2024-03-11 RX ORDER — SODIUM CHLORIDE 9 MG/ML
100 INJECTION, SOLUTION INTRAVENOUS CONTINUOUS
Status: DISPENSED | OUTPATIENT
Start: 2024-03-11 | End: 2024-03-12

## 2024-03-11 RX ORDER — AZITHROMYCIN 250 MG/1
500 TABLET, FILM COATED ORAL
Qty: 6 TABLET | Refills: 0 | Status: COMPLETED | OUTPATIENT
Start: 2024-03-11 | End: 2024-03-13

## 2024-03-11 RX ORDER — POLYETHYLENE GLYCOL 3350 17 G/17G
17 POWDER, FOR SOLUTION ORAL DAILY PRN
Status: DISCONTINUED | OUTPATIENT
Start: 2024-03-11 | End: 2024-03-19 | Stop reason: HOSPADM

## 2024-03-11 RX ORDER — ACETAMINOPHEN 160 MG/5ML
650 SOLUTION ORAL EVERY 4 HOURS PRN
Status: DISCONTINUED | OUTPATIENT
Start: 2024-03-11 | End: 2024-03-19 | Stop reason: HOSPADM

## 2024-03-11 RX ORDER — FAMOTIDINE 20 MG/1
40 TABLET, FILM COATED ORAL DAILY
Status: DISCONTINUED | OUTPATIENT
Start: 2024-03-12 | End: 2024-03-19 | Stop reason: HOSPADM

## 2024-03-11 RX ORDER — NICOTINE POLACRILEX 4 MG
15 LOZENGE BUCCAL
Status: DISCONTINUED | OUTPATIENT
Start: 2024-03-11 | End: 2024-03-19 | Stop reason: HOSPADM

## 2024-03-11 RX ORDER — ACETAMINOPHEN 650 MG/1
650 SUPPOSITORY RECTAL EVERY 4 HOURS PRN
Status: DISCONTINUED | OUTPATIENT
Start: 2024-03-11 | End: 2024-03-19 | Stop reason: HOSPADM

## 2024-03-11 RX ORDER — BISACODYL 10 MG
10 SUPPOSITORY, RECTAL RECTAL DAILY PRN
Status: DISCONTINUED | OUTPATIENT
Start: 2024-03-11 | End: 2024-03-19 | Stop reason: HOSPADM

## 2024-03-11 RX ORDER — DEXTROSE MONOHYDRATE 25 G/50ML
25 INJECTION, SOLUTION INTRAVENOUS
Status: DISCONTINUED | OUTPATIENT
Start: 2024-03-11 | End: 2024-03-19 | Stop reason: HOSPADM

## 2024-03-11 RX ORDER — HYDRALAZINE HYDROCHLORIDE 20 MG/ML
5 INJECTION INTRAMUSCULAR; INTRAVENOUS EVERY 4 HOURS PRN
Status: DISCONTINUED | OUTPATIENT
Start: 2024-03-11 | End: 2024-03-19 | Stop reason: HOSPADM

## 2024-03-11 RX ORDER — AMOXICILLIN 250 MG
2 CAPSULE ORAL 2 TIMES DAILY PRN
Status: DISCONTINUED | OUTPATIENT
Start: 2024-03-11 | End: 2024-03-19 | Stop reason: HOSPADM

## 2024-03-11 RX ORDER — SODIUM CHLORIDE 9 MG/ML
40 INJECTION, SOLUTION INTRAVENOUS AS NEEDED
Status: DISCONTINUED | OUTPATIENT
Start: 2024-03-11 | End: 2024-03-19 | Stop reason: HOSPADM

## 2024-03-11 RX ORDER — HEPARIN SODIUM 5000 [USP'U]/ML
5000 INJECTION, SOLUTION INTRAVENOUS; SUBCUTANEOUS EVERY 12 HOURS SCHEDULED
Status: DISCONTINUED | OUTPATIENT
Start: 2024-03-11 | End: 2024-03-19 | Stop reason: HOSPADM

## 2024-03-11 RX ORDER — SODIUM CHLORIDE 0.9 % (FLUSH) 0.9 %
10 SYRINGE (ML) INJECTION AS NEEDED
Status: DISCONTINUED | OUTPATIENT
Start: 2024-03-11 | End: 2024-03-19 | Stop reason: HOSPADM

## 2024-03-11 RX ADMIN — SODIUM CHLORIDE 100 ML/HR: 9 INJECTION, SOLUTION INTRAVENOUS at 19:28

## 2024-03-11 RX ADMIN — Medication 6 MG: at 23:45

## 2024-03-11 RX ADMIN — HEPARIN SODIUM 5000 UNITS: 5000 INJECTION INTRAVENOUS; SUBCUTANEOUS at 23:45

## 2024-03-11 RX ADMIN — AZITHROMYCIN 500 MG: 250 TABLET, FILM COATED ORAL at 19:29

## 2024-03-11 RX ADMIN — Medication 10 ML: at 23:45

## 2024-03-11 RX ADMIN — INSULIN HUMAN 3 UNITS: 100 INJECTION, SOLUTION PARENTERAL at 18:32

## 2024-03-11 RX ADMIN — CEFTRIAXONE SODIUM 1000 MG: 1 INJECTION, POWDER, FOR SOLUTION INTRAMUSCULAR; INTRAVENOUS at 19:34

## 2024-03-11 NOTE — H&P
[facility]  HISTORY AND PHYSICAL    Date of Admission: 3/11/2024  Primary Care Physician: Marcos Figueroa MD    Subjective   Primary Historian: Patient    Chief Complaint: Shortness of breath and fevers    History of Present Illness  69-year-old male with history of diabetes mellitus type 2, hypertension, recent COVID-19 in February 2024, came to the hospital reporting symptoms for approximately 5 days, given by chills, fevers 101.2, shortness of breath, dry cough and episodes of vomiting.  He went to another hospital ER, was found to be hypoxemic, started on BiPAP support, received Levaquin IV for management of pneumonia.  He was transferred for higher level of care.  Initially admitted to the intensive care unit.  Patient found on BiPAP support, which was removed, placed on nasal cannula, saturating 98 to 91% on 4 L.  He was started on Vapotherm with improvement.  He reports mild dyspnea.  Laboratory tests from the other facility were reviewed.        Review of Systems   Otherwise complete ROS reviewed and negative except as mentioned in the HPI.    Past Medical History:   Past Medical History:   Diagnosis Date    Diabetes mellitus     Hyperlipidemia     Hypertension      Past Surgical History:  Left upper extremity fracture ORIF.  History reviewed. No pertinent surgical history.  Social History:  reports that he has never smoked. He has never used smokeless tobacco. He reports that he does not drink alcohol and does not use drugs.    Family History: family history is not on file.   Reviewed    Allergies:  Allergies   Allergen Reactions    Aspirin Angioedema    Penicillins Myalgia       Medications:  Prior to Admission medications    Medication Sig Start Date End Date Taking? Authorizing Provider   amLODIPine (NORVASC) 5 MG tablet Take 1 tablet by mouth Daily.    Provider, MD Sean   gabapentin (NEURONTIN) 600 MG tablet Take 1 tablet by mouth 2 (Two)  Times a Day.    Sean Cronin MD   glipizide (GLUCOTROL) 10 MG tablet Take 1 tablet by mouth Daily.    Sean Cronin MD   lisinopril-hydrochlorothiazide (PRINZIDE,ZESTORETIC) 20-25 MG per tablet Take 1 tablet by mouth Daily.    Sean Cronin MD   pioglitazone (ACTOS) 30 MG tablet Take 1 tablet by mouth Daily.    Sean Cronin MD   rosuvastatin (CRESTOR) 10 MG tablet Take 1 tablet by mouth Daily.    Sean Cronin MD   sertraline (ZOLOFT) 50 MG tablet Take 1 tablet by mouth Daily.    Sean Cronin MD     I have utilized all available immediate resources to obtain, update, or review the patient's current medications (including all prescriptions, over-the-counter products, herbals, cannabis/cannabidiol products, and vitamin/mineral/dietary (nutritional) supplements).    Objective     Vital Signs: /61   Pulse 106   Temp 100.5 °F (38.1 °C) (Oral)   Wt 134 kg (295 lb 3.1 oz)   SpO2 94%   BMI 38.95 kg/m²   Physical Exam   Constitutional:       Appearance: Normal appearance.  Mild tachypnea.  On Vapotherm.  HENT:      Head: Normocephalic and atraumatic.      Nose: Nose normal.      Mouth/Throat:      Mouth: Mucous membranes are moist.      Pharynx: Oropharynx is clear.   Eyes:      Extraocular Movements: Extraocular movements intact.      Conjunctiva/sclera: Conjunctivae normal.      Pupils: Pupils are equal, round, and reactive to light.   Cardiovascular:      Rate and Rhythm: Normal rate and regular rhythm.      Pulses: Normal pulses.   Pulmonary:      Effort: No respiratory distress.      Breath sounds: Decreased breath sounds on the right base, Rales in both bases.  No wheezing.    Abdominal:      General: Abdomen is obese..  Bowel sounds are normal.      Palpations: Abdomen is soft.      Tenderness: There is no guarding or rebound.   Musculoskeletal:         General: Normal range of motion.      Cervical back: Normal range of motion and neck supple.    Extremities:  No lower extremity edema.  Skin:     Capillary Refill: Capillary refill takes less than 2 seconds.      Coloration: Skin is not jaundiced.      Findings: No rash.   Neurological:      General: No focal deficit present.      Mental Status: Patient is alert, oriented to place time and person.     Sensory: No sensory deficit.      Motor: No weakness.      Coordination: Coordination normal.   Psychiatric:         Mood and Affect: Mood normal.         Behavior: Behavior normal.           Results Reviewed:  Lab Results (last 24 hours)       Procedure Component Value Units Date/Time    CBC & Differential [[id number]]  (Abnormal) Collected: 03/11/24 1813    Specimen: Blood Updated: 03/11/24 1823    Narrative:      The following orders were created for panel order CBC & Differential.  Procedure                               Abnormality         Status                     ---------                               -----------         ------                     CBC Auto Differential[[id number]]        Abnormal            Final result                 Please view results for these tests on the individual orders.    CBC Auto Differential [[id number]]  (Abnormal) Collected: 03/11/24 1813    Specimen: Blood Updated: 03/11/24 1823     WBC 18.60 10*3/mm3      RBC 4.34 10*6/mm3      Hemoglobin 12.8 g/dL      Hematocrit 38.8 %      MCV 89.4 fL      MCH 29.5 pg      MCHC 33.0 g/dL      RDW 15.7 %      RDW-SD 51.8 fl      MPV 9.1 fL      Platelets 157 10*3/mm3      Neutrophil % 87.5 %      Lymphocyte % 2.7 %      Monocyte % 8.4 %      Eosinophil % 0.0 %      Basophil % 0.3 %      Immature Grans % 1.1 %      Neutrophils, Absolute 16.26 10*3/mm3      Lymphocytes, Absolute 0.51 10*3/mm3      Monocytes, Absolute 1.56 10*3/mm3      Eosinophils, Absolute 0.00 10*3/mm3      Basophils, Absolute 0.06 10*3/mm3      Immature Grans, Absolute 0.21 10*3/mm3      nRBC 0.0 /100 WBC     Basic Metabolic Panel [[id number]] Collected: 03/11/24 1812     Specimen: Blood Updated: 03/11/24 1819    Magnesium [[id number]] Collected: 03/11/24 1812    Specimen: Blood Updated: 03/11/24 1819    Hemoglobin A1c [[id number]] Collected: 03/11/24 1812    Specimen: Blood Updated: 03/11/24 1819          Imaging Results (Last 24 Hours)       ** No results found for the last 24 hours. **          I have personally reviewed and interpreted the radiology studies and ECG obtained at time of admission.     Assessment / Plan   Assessment:   Active Hospital Problems    Diagnosis     **Acute respiratory failure with hypoxemia      Acute respiratory failure with hypoxemia  Community-acquired pneumonia  Hypertension  Diabetes mellitus type 2  Morbid obesity, BMI 38.9      Initial blood gases performed March 11, 2024 at 10 AM, showed a pH of 7.53.  pCO2 35, pO2 45.  Saturation of oxygen at 86.2.  HCO3 29.6; this was performed on room air.    At 2 PM repeat ABGs showed a pH of 7.44.  pCO2 43.  pO2 82.  Saturation of oxygen 96.4%.  FiO2 was 50% at that time.    Glucose was 256.  Creatinine 1.2.  Potassium 3.7.  Sodium 133.  CO2 27.3.  AST 18 ALT 26.  Total bilirubin 1.3 alkaline phosphatase 107.      Treatment Plan  The patient will be admitted to my service here at [facility].  I have requested laboratory test to be performed on arrival.  CBC, BMP; I have requested a chest x-ray and blood gases stat.  Patient will continue oxygen supplementation, Vapotherm 35% FiO2.  Nebulizations with Atrovent as needed.  Patient stated he is allergic to penicillins, but flulike symptoms when he was a child.  For this reason, no history of anaphylactic reactions, will start ceftriaxone and azithromycin.  Will follow laboratory tests, ABGs and chest x-ray requested.  The patient may be able to transfer to the floor, not requiring of noninvasive ventilatory support.  Sliding scale of insulin.  Dysphagia screening.  Start diet diabetic.  Heparin subcutaneous for DVT prophylaxis.    Medical Decision  Making  Number and Complexity of problems: 5, Moderate complexity  Differential Diagnosis: See above    Conditions and Status        Condition is improving.     [facility] Data  External documents reviewed: Yes, see above  Cardiac tracing (EKG, telemetry) interpretation: Electrocardiogram requested  Radiology interpretation: Radiology reports pending  Labs reviewed: Yes from the other facility  Any tests that were considered but not ordered: None     Decision rules/scores evaluated (example [id number], [place], etc): None     Discussed with: Patient and nurse staff     Care Planning  Shared decision making: With patient  Code status and discussions: Full code    Disposition  Social Determinants of Health that impact treatment or disposition: None  Estimated length of stay is 2 days.     I confirmed that the patient's advanced care plan is present, code status is documented, and a surrogate decision maker is listed in the patient's medical record.     The patient's surrogate decision maker is family member.     The patient was seen and examined by me on March 11, 2024 at 6:15 PM.    Electronically signed by Leobardo Avery MD, 03/11/24, 18:26 CDT.

## 2024-03-12 ENCOUNTER — APPOINTMENT (OUTPATIENT)
Dept: GENERAL RADIOLOGY | Facility: HOSPITAL | Age: 69
DRG: 189 | End: 2024-03-12
Payer: MEDICARE

## 2024-03-12 ENCOUNTER — APPOINTMENT (OUTPATIENT)
Dept: ULTRASOUND IMAGING | Facility: HOSPITAL | Age: 69
DRG: 189 | End: 2024-03-12
Payer: MEDICARE

## 2024-03-12 PROBLEM — E11.9 DM (DIABETES MELLITUS), TYPE 2: Chronic | Status: ACTIVE | Noted: 2024-03-12

## 2024-03-12 PROBLEM — E66.9 OBESITY (BMI 30-39.9): Chronic | Status: ACTIVE | Noted: 2024-03-12

## 2024-03-12 PROBLEM — I10 ESSENTIAL HYPERTENSION, BENIGN: Chronic | Status: ACTIVE | Noted: 2024-03-12

## 2024-03-12 PROBLEM — J18.9 ATYPICAL PNEUMONIA: Status: ACTIVE | Noted: 2024-03-12

## 2024-03-12 LAB
ALBUMIN SERPL-MCNC: 3.6 G/DL (ref 3.5–5.2)
ALBUMIN/GLOB SERPL: 1 G/DL
ALP SERPL-CCNC: 92 U/L (ref 39–117)
ALT SERPL W P-5'-P-CCNC: 22 U/L (ref 1–41)
ANION GAP SERPL CALCULATED.3IONS-SCNC: 12 MMOL/L (ref 5–15)
AST SERPL-CCNC: 21 U/L (ref 1–40)
B PARAPERT DNA SPEC QL NAA+PROBE: NOT DETECTED
B PERT DNA SPEC QL NAA+PROBE: NOT DETECTED
BASOPHILS # BLD AUTO: 0.05 10*3/MM3 (ref 0–0.2)
BASOPHILS NFR BLD AUTO: 0.3 % (ref 0–1.5)
BILIRUB SERPL-MCNC: 0.8 MG/DL (ref 0–1.2)
BUN SERPL-MCNC: 37 MG/DL (ref 8–23)
BUN/CREAT SERPL: 19.6 (ref 7–25)
C PNEUM DNA NPH QL NAA+NON-PROBE: NOT DETECTED
CALCIUM SPEC-SCNC: 8.7 MG/DL (ref 8.6–10.5)
CHLORIDE SERPL-SCNC: 95 MMOL/L (ref 98–107)
CO2 SERPL-SCNC: 28 MMOL/L (ref 22–29)
CREAT SERPL-MCNC: 1.89 MG/DL (ref 0.76–1.27)
DEPRECATED RDW RBC AUTO: 52.6 FL (ref 37–54)
EGFRCR SERPLBLD CKD-EPI 2021: 37.7 ML/MIN/1.73
EOSINOPHIL # BLD AUTO: 0.04 10*3/MM3 (ref 0–0.4)
EOSINOPHIL NFR BLD AUTO: 0.2 % (ref 0.3–6.2)
ERYTHROCYTE [DISTWIDTH] IN BLOOD BY AUTOMATED COUNT: 15.8 % (ref 12.3–15.4)
FLUAV SUBTYP SPEC NAA+PROBE: NOT DETECTED
FLUBV RNA ISLT QL NAA+PROBE: NOT DETECTED
GLOBULIN UR ELPH-MCNC: 3.7 GM/DL
GLUCOSE BLDC GLUCOMTR-MCNC: 129 MG/DL (ref 70–130)
GLUCOSE BLDC GLUCOMTR-MCNC: 153 MG/DL (ref 70–130)
GLUCOSE BLDC GLUCOMTR-MCNC: 154 MG/DL (ref 70–130)
GLUCOSE BLDC GLUCOMTR-MCNC: 154 MG/DL (ref 70–130)
GLUCOSE BLDC GLUCOMTR-MCNC: 158 MG/DL (ref 70–130)
GLUCOSE BLDC GLUCOMTR-MCNC: 212 MG/DL (ref 70–130)
GLUCOSE SERPL-MCNC: 154 MG/DL (ref 65–99)
HADV DNA SPEC NAA+PROBE: NOT DETECTED
HCOV 229E RNA SPEC QL NAA+PROBE: NOT DETECTED
HCOV HKU1 RNA SPEC QL NAA+PROBE: NOT DETECTED
HCOV NL63 RNA SPEC QL NAA+PROBE: NOT DETECTED
HCOV OC43 RNA SPEC QL NAA+PROBE: NOT DETECTED
HCT VFR BLD AUTO: 41.3 % (ref 37.5–51)
HGB BLD-MCNC: 13.3 G/DL (ref 13–17.7)
HMPV RNA NPH QL NAA+NON-PROBE: NOT DETECTED
HPIV1 RNA ISLT QL NAA+PROBE: NOT DETECTED
HPIV2 RNA SPEC QL NAA+PROBE: NOT DETECTED
HPIV3 RNA NPH QL NAA+PROBE: NOT DETECTED
HPIV4 P GENE NPH QL NAA+PROBE: NOT DETECTED
IMM GRANULOCYTES # BLD AUTO: 0.23 10*3/MM3 (ref 0–0.05)
IMM GRANULOCYTES NFR BLD AUTO: 1.3 % (ref 0–0.5)
L PNEUMO1 AG UR QL IA: NEGATIVE
LYMPHOCYTES # BLD AUTO: 0.95 10*3/MM3 (ref 0.7–3.1)
LYMPHOCYTES NFR BLD AUTO: 5.4 % (ref 19.6–45.3)
M PNEUMO IGG SER IA-ACNC: NOT DETECTED
MAGNESIUM SERPL-MCNC: 2 MG/DL (ref 1.6–2.4)
MCH RBC QN AUTO: 29.5 PG (ref 26.6–33)
MCHC RBC AUTO-ENTMCNC: 32.2 G/DL (ref 31.5–35.7)
MCV RBC AUTO: 91.6 FL (ref 79–97)
MONOCYTES # BLD AUTO: 1.51 10*3/MM3 (ref 0.1–0.9)
MONOCYTES NFR BLD AUTO: 8.6 % (ref 5–12)
NEUTROPHILS NFR BLD AUTO: 14.86 10*3/MM3 (ref 1.7–7)
NEUTROPHILS NFR BLD AUTO: 84.2 % (ref 42.7–76)
NRBC BLD AUTO-RTO: 0 /100 WBC (ref 0–0.2)
NT-PROBNP SERPL-MCNC: 348 PG/ML (ref 0–900)
PLATELET # BLD AUTO: 157 10*3/MM3 (ref 140–450)
PMV BLD AUTO: 9.2 FL (ref 6–12)
POTASSIUM SERPL-SCNC: 3.9 MMOL/L (ref 3.5–5.2)
PROT SERPL-MCNC: 7.3 G/DL (ref 6–8.5)
RBC # BLD AUTO: 4.51 10*6/MM3 (ref 4.14–5.8)
RHINOVIRUS RNA SPEC NAA+PROBE: NOT DETECTED
RSV RNA NPH QL NAA+NON-PROBE: NOT DETECTED
S PNEUM AG SPEC QL LA: NEGATIVE
SARS-COV-2 RNA NPH QL NAA+NON-PROBE: NOT DETECTED
SODIUM SERPL-SCNC: 135 MMOL/L (ref 136–145)
WBC NRBC COR # BLD AUTO: 17.64 10*3/MM3 (ref 3.4–10.8)

## 2024-03-12 PROCEDURE — 97162 PT EVAL MOD COMPLEX 30 MIN: CPT

## 2024-03-12 PROCEDURE — 99223 1ST HOSP IP/OBS HIGH 75: CPT | Performed by: INTERNAL MEDICINE

## 2024-03-12 PROCEDURE — 63710000001 INSULIN REGULAR HUMAN PER 5 UNITS: Performed by: FAMILY MEDICINE

## 2024-03-12 PROCEDURE — 0202U NFCT DS 22 TRGT SARS-COV-2: CPT | Performed by: NURSE PRACTITIONER

## 2024-03-12 PROCEDURE — 25010000002 HEPARIN (PORCINE) PER 1000 UNITS: Performed by: FAMILY MEDICINE

## 2024-03-12 PROCEDURE — 94799 UNLISTED PULMONARY SVC/PX: CPT

## 2024-03-12 PROCEDURE — 80053 COMPREHEN METABOLIC PANEL: CPT | Performed by: FAMILY MEDICINE

## 2024-03-12 PROCEDURE — 83735 ASSAY OF MAGNESIUM: CPT | Performed by: FAMILY MEDICINE

## 2024-03-12 PROCEDURE — 82948 REAGENT STRIP/BLOOD GLUCOSE: CPT

## 2024-03-12 PROCEDURE — 71045 X-RAY EXAM CHEST 1 VIEW: CPT

## 2024-03-12 PROCEDURE — 87449 NOS EACH ORGANISM AG IA: CPT | Performed by: INTERNAL MEDICINE

## 2024-03-12 PROCEDURE — 94761 N-INVAS EAR/PLS OXIMETRY MLT: CPT

## 2024-03-12 PROCEDURE — 83880 ASSAY OF NATRIURETIC PEPTIDE: CPT | Performed by: FAMILY MEDICINE

## 2024-03-12 PROCEDURE — 76775 US EXAM ABDO BACK WALL LIM: CPT

## 2024-03-12 PROCEDURE — 25010000002 CEFTRIAXONE PER 250 MG: Performed by: FAMILY MEDICINE

## 2024-03-12 PROCEDURE — 94660 CPAP INITIATION&MGMT: CPT

## 2024-03-12 PROCEDURE — 92610 EVALUATE SWALLOWING FUNCTION: CPT

## 2024-03-12 PROCEDURE — 85025 COMPLETE CBC W/AUTO DIFF WBC: CPT | Performed by: FAMILY MEDICINE

## 2024-03-12 PROCEDURE — 25810000003 SODIUM CHLORIDE 0.9 % SOLUTION: Performed by: FAMILY MEDICINE

## 2024-03-12 RX ORDER — GABAPENTIN 300 MG/1
600 CAPSULE ORAL EVERY 12 HOURS SCHEDULED
Status: DISCONTINUED | OUTPATIENT
Start: 2024-03-12 | End: 2024-03-19 | Stop reason: HOSPADM

## 2024-03-12 RX ORDER — PIOGLITAZONEHYDROCHLORIDE 30 MG/1
30 TABLET ORAL DAILY
Status: DISCONTINUED | OUTPATIENT
Start: 2024-03-12 | End: 2024-03-19 | Stop reason: HOSPADM

## 2024-03-12 RX ORDER — SEMAGLUTIDE 1.34 MG/ML
1 INJECTION, SOLUTION SUBCUTANEOUS WEEKLY
COMMUNITY

## 2024-03-12 RX ORDER — ROSUVASTATIN CALCIUM 10 MG/1
10 TABLET, COATED ORAL DAILY
Status: DISCONTINUED | OUTPATIENT
Start: 2024-03-12 | End: 2024-03-19 | Stop reason: HOSPADM

## 2024-03-12 RX ORDER — SODIUM CHLORIDE 9 MG/ML
100 INJECTION, SOLUTION INTRAVENOUS CONTINUOUS
Status: DISPENSED | OUTPATIENT
Start: 2024-03-12 | End: 2024-03-12

## 2024-03-12 RX ORDER — AMLODIPINE BESYLATE 5 MG/1
5 TABLET ORAL DAILY
Status: DISCONTINUED | OUTPATIENT
Start: 2024-03-12 | End: 2024-03-19 | Stop reason: HOSPADM

## 2024-03-12 RX ADMIN — SODIUM CHLORIDE 100 ML/HR: 9 INJECTION, SOLUTION INTRAVENOUS at 21:30

## 2024-03-12 RX ADMIN — HEPARIN SODIUM 5000 UNITS: 5000 INJECTION INTRAVENOUS; SUBCUTANEOUS at 08:32

## 2024-03-12 RX ADMIN — Medication 10 ML: at 08:33

## 2024-03-12 RX ADMIN — ROSUVASTATIN CALCIUM 10 MG: 10 TABLET, FILM COATED ORAL at 10:07

## 2024-03-12 RX ADMIN — PIOGLITAZONE 30 MG: 30 TABLET ORAL at 10:07

## 2024-03-12 RX ADMIN — SODIUM CHLORIDE 100 ML/HR: 9 INJECTION, SOLUTION INTRAVENOUS at 05:28

## 2024-03-12 RX ADMIN — GABAPENTIN 600 MG: 300 CAPSULE ORAL at 20:40

## 2024-03-12 RX ADMIN — AMLODIPINE BESYLATE 5 MG: 5 TABLET ORAL at 10:07

## 2024-03-12 RX ADMIN — AZITHROMYCIN 500 MG: 250 TABLET, FILM COATED ORAL at 18:33

## 2024-03-12 RX ADMIN — FAMOTIDINE 40 MG: 20 TABLET, FILM COATED ORAL at 08:32

## 2024-03-12 RX ADMIN — CEFTRIAXONE SODIUM 1000 MG: 1 INJECTION, POWDER, FOR SOLUTION INTRAMUSCULAR; INTRAVENOUS at 18:34

## 2024-03-12 RX ADMIN — INSULIN HUMAN 2 UNITS: 100 INJECTION, SOLUTION PARENTERAL at 11:02

## 2024-03-12 RX ADMIN — ACETAMINOPHEN 650 MG: 325 TABLET, FILM COATED ORAL at 18:18

## 2024-03-12 RX ADMIN — HEPARIN SODIUM 5000 UNITS: 5000 INJECTION INTRAVENOUS; SUBCUTANEOUS at 20:37

## 2024-03-12 RX ADMIN — GABAPENTIN 600 MG: 300 CAPSULE ORAL at 10:07

## 2024-03-12 RX ADMIN — SERTRALINE 50 MG: 50 TABLET, FILM COATED ORAL at 10:07

## 2024-03-12 NOTE — PLAN OF CARE
Problem: Noninvasive Ventilation Acute  Goal: Effective Unassisted Ventilation and Oxygenation  Outcome: Ongoing, Progressing   Goal Outcome Evaluation:      Continue use and weaning of BIPAP as needed and tolerated

## 2024-03-12 NOTE — PROGRESS NOTES
RT EQUIPMENT DEVICE RELATED - SKIN ASSESSMENT    Philippe Score:  Philippe Score: 20     RT Medical Equipment/Device:     NIV Mask:  Under-the-nose   size:  C    Skin Assessment:      Cheek:  Intact  Chin:  Intact  Nares:  Intact  Lips:  Intact  Mouth:  Intact    Device Skin Pressure Protection:  Skin-to-device areas padded:  None Required    Nurse Notification:  No    Tucker Gutierrez, RRT    Simple: Patient demonstrates quick and easy understanding

## 2024-03-12 NOTE — PLAN OF CARE
Goal Outcome Evaluation:  Plan of Care Reviewed With: patient        Progress: improving  Outcome Evaluation: see note      Anticipated Discharge Disposition (SLP): No further SLP services warranted          SLP Swallowing Diagnosis: swallow WFL/no suspected pharyngeal impairment (03/12/24 6412)

## 2024-03-12 NOTE — PROGRESS NOTES
RT EQUIPMENT DEVICE RELATED - SKIN ASSESSMENT    Philippe Score:  Philippe Score: 20     RT Medical Equipment/Device:     High Flow Nasal Cannula:   Salter    Skin Assessment:      Nares:  Intact  Nose:  Intact    Device Skin Pressure Protection:  Skin-to-device areas padded:  None Required    Nurse Notification:  Raisa Porter, RRT

## 2024-03-12 NOTE — PLAN OF CARE
Goal Outcome Evaluation:              Problem: Adult Inpatient Plan of Care  Goal: Plan of Care Review  Outcome: Ongoing, Not Progressing  Goal: Patient-Specific Goal (Individualized)  Outcome: Ongoing, Not Progressing  Goal: Absence of Hospital-Acquired Illness or Injury  Outcome: Ongoing, Not Progressing  Intervention: Identify and Manage Fall Risk  Description: Perform standard risk assessment on admission using a validated tool or comprehensive approach appropriate to the patient; reassess fall risk frequently, with change in status or transfer to another level of care.  Communicate fall injury risk to interprofessional healthcare team.  Determine need for increased observation, equipment and environmental modification, such as low bed, signage and supportive, nonskid footwear.  Adjust safety measures to individual developmental age, stage and identified risk factors.  Reinforce the importance of safety and physical activity with patient and family.  Perform regular intentional rounding to assess need for position change, pain assessment and personal needs, including assistance with toileting.  Recent Flowsheet Documentation  Taken 3/12/2024 0600 by Levar Wen RN  Safety Promotion/Fall Prevention:   safety round/check completed   fall prevention program maintained  Taken 3/12/2024 0500 by Levar Wen RN  Safety Promotion/Fall Prevention:   safety round/check completed   fall prevention program maintained  Taken 3/12/2024 0400 by Levar Wen RN  Safety Promotion/Fall Prevention: safety round/check completed  Taken 3/12/2024 0300 by Levar Wen, RN  Safety Promotion/Fall Prevention:   safety round/check completed   fall prevention program maintained  Taken 3/12/2024 0200 by Levar Wen, RN  Safety Promotion/Fall Prevention:   safety round/check completed   fall prevention program maintained  Taken 3/12/2024 0100 by Levar Wen, RN  Safety Promotion/Fall Prevention:   safety round/check completed   fall  prevention program maintained  Taken 3/12/2024 0000 by Levar Wen RN  Safety Promotion/Fall Prevention: safety round/check completed  Taken 3/11/2024 2300 by Levar Wen RN  Safety Promotion/Fall Prevention:   safety round/check completed   fall prevention program maintained  Taken 3/11/2024 2200 by Levar Wen RN  Safety Promotion/Fall Prevention:   safety round/check completed   fall prevention program maintained  Taken 3/11/2024 2100 by Levar Wen RN  Safety Promotion/Fall Prevention:   safety round/check completed   fall prevention program maintained  Taken 3/11/2024 2000 by Levar Wen RN  Safety Promotion/Fall Prevention: safety round/check completed  Taken 3/11/2024 1900 by Levar Wen RN  Safety Promotion/Fall Prevention:   safety round/check completed   fall prevention program maintained  Intervention: Prevent Skin Injury  Description: Perform a screening for skin injury risk, such as pressure or moisture associated skin damage on admission and at regular intervals throughout hospital stay.  Keep all areas of skin (especially folds) clean and dry.  Maintain adequate skin hydration.  Relieve and redistribute pressure and protect bony prominences; implement measures based on patient-specific risk factors.  Match turning and repositioning schedule to clinical condition.  Encourage weight shift frequently; assist with reposition if unable to complete independently.  Float heels off bed; avoid pressure on the Achilles tendon.  Keep skin free from extended contact with medical devices.  Encourage functional activity and mobility, as early as tolerated.  Use aids (e.g., slide boards, mechanical lift) during transfer.  Recent Flowsheet Documentation  Taken 3/12/2024 0600 by Levar Wen RN  Body Position: position changed independently  Taken 3/12/2024 0400 by Levar Wen RN  Body Position: position changed independently  Taken 3/12/2024 0200 by Levar Wen RN  Body Position: position changed  independently  Taken 3/12/2024 0000 by Levar Wen RN  Body Position: position changed independently  Taken 3/11/2024 2200 by Levar Wen RN  Body Position: position changed independently  Taken 3/11/2024 2000 by Levar Wen RN  Body Position: position changed independently  Intervention: Prevent and Manage VTE (Venous Thromboembolism) Risk  Description: Assess for VTE (venous thromboembolism) risk.  Encourage and assist with early ambulation.  Initiate and maintain compression or other therapy, as indicated, based on identified risk in accordance with organizational protocol and provider order.  Encourage both active and passive leg exercises while in bed, if unable to ambulate.  Recent Flowsheet Documentation  Taken 3/12/2024 0400 by Levar Wen RN  Activity Management: bedrest  Taken 3/12/2024 0000 by Levar Wen RN  Activity Management: bedrest  Taken 3/11/2024 2000 by Levar Wen RN  Activity Management: bedrest  Intervention: Prevent Infection  Description: Maintain skin and mucous membrane integrity; promote hand, oral and pulmonary hygiene.  Optimize fluid balance, nutrition, sleep and glycemic control to maximize infection resistance.  Identify potential sources of infection early to prevent or mitigate progression of infection (e.g., wound, lines, devices).  Evaluate ongoing need for invasive devices; remove promptly when no longer indicated.  Recent Flowsheet Documentation  Taken 3/12/2024 0400 by Levar Wen RN  Infection Prevention:   environmental surveillance performed   single patient room provided  Taken 3/12/2024 0000 by Levar Wen RN  Infection Prevention:   environmental surveillance performed   single patient room provided  Taken 3/11/2024 2000 by Levar Wen RN  Infection Prevention:   environmental surveillance performed   single patient room provided  Goal: Optimal Comfort and Wellbeing  Outcome: Ongoing, Not Progressing  Intervention: Provide Person-Centered  Care  Description: Use a family-focused approach to care.  Develop trust and rapport by proactively providing information, encouraging questions, addressing concerns and offering reassurance.  Acknowledge emotional response to hospitalization.  Recognize and utilize personal coping strategies.  Honor spiritual and cultural preferences.  Recent Flowsheet Documentation  Taken 3/12/2024 0400 by Levar Wen RN  Trust Relationship/Rapport:   care explained   questions answered   thoughts/feelings acknowledged  Taken 3/12/2024 0000 by Levar Wen RN  Trust Relationship/Rapport:   care explained   questions answered   thoughts/feelings acknowledged  Taken 3/11/2024 2000 by Levar Wen RN  Trust Relationship/Rapport:   care explained   questions answered   thoughts/feelings acknowledged  Goal: Readiness for Transition of Care  Outcome: Ongoing, Not Progressing

## 2024-03-12 NOTE — THERAPY DISCHARGE NOTE
Acute Care - Speech Language Pathology   Swallow Initial Evaluation/Discharge [facility]     Patient Name: Kolby Michel  : 1955  MRN: [id number]  Today's Date: 3/12/2024               Admit Date: 3/11/2024    SPEECH-LANGUAGE PATHOLOGY EVALUATION - SWALLOW  Subjective: The patient was seen on this date for a Clinical Swallow evaluation.  Patient was alert and cooperative.  Significant history: Presented d/t chills, feer, SOB, dry cough, vomiting. Significant hx of diabetes type 2, HTN, recent COVID-19 (2024). Found to be hypoxemic. Pt and RN Clinton reported no difficulty with current diet.    Objective: Textures given included thin liquid and regular consistency.  Assessment: Difficulties were noted with none of the above consistencies.  Observations: Pt demonstrated no overt s/s of aspiration or distress. Vocal quality remained clear.   SLP Findings:  Patient presents with functional swallow, without esophageal component.     Recommendations: Diet Textures: thin liquid, regular consistency food.  Medications should be taken whole with thin liquids. May have water and ice between meals after oral care, under staff or family supervision and with the recommended strategies for safe swallowing.   Recommended Strategies: Upright for PO, small bites and sips, may use straw, and alternate liquids and solids. Oral care before breakfast, after all meals and PRN.  Other Recommended Evaluations: N/A    Dysphagia therapy is not recommended. Rationale: pt at baseline.    Kimberly Whiteside MS CCC-SLP 3/12/2024 09:25 CDT        Visit Dx:    ICD-10-CM ICD-9-CM   1. Dysphagia, unspecified type [R13.10]  R13.10 787.20     Patient Active Problem List   Diagnosis    Acute respiratory failure with hypoxemia    Atypical pneumonia    DM (diabetes mellitus), type 2    Essential hypertension, benign    Obesity (BMI 30-39.9)     Past Medical History:   Diagnosis Date    Diabetes mellitus     Hyperlipidemia     Hypertension      History  reviewed. No pertinent surgical history.    SLP Recommendation and Plan  SLP Swallowing Diagnosis: swallow WFL/no suspected pharyngeal impairment (03/12/24 0856)  SLP Diet Recommendation: regular textures, thin liquids (03/12/24 0856)     Monitor for Signs of Aspiration: yes, notify SLP if any concerns (03/12/24 0856)  Recommended Diagnostics: No further SLP services recommended (03/12/24 0856)  Swallow Criteria for Skilled Therapeutic Interventions Met: no problems identified which require skilled intervention (03/12/24 0856)  Anticipated Discharge Disposition (SLP): No further SLP services warranted (03/12/24 0856)  Rehab Potential/Prognosis, Swallowing: good, to achieve stated therapy goals (03/12/24 0856)  Therapy Frequency (Swallow): evaluation only (03/12/24 0856)  Predicted Duration Therapy Intervention (Days): until discharge (03/12/24 0856)           Anticipated Discharge Disposition (SLP): No further SLP services warranted (03/12/24 0856)                            Plan of Care Reviewed With: patient (03/12/24 0918)  Progress: improving (03/12/24 0918)  Outcome Evaluation: see note (03/12/24 0918)    SWALLOW EVALUATION (Last 72 Hours)       SLP Adult Swallow Evaluation       Row Name 03/12/24 0856                   Rehab Evaluation    Document Type evaluation  -JR        Subjective Information no complaints  -JR        Patient Observations alert;cooperative;agree to therapy  -JR        Patient/Family/Caregiver Comments/Observations wife at bedside  -JR        Patient Effort good  -JR        Symptoms Noted During/After Treatment none  -JR           General Information    Patient Profile Reviewed yes  -JR        Pertinent History Of Current Problem --  Presented d/t chills, feer, SOB, dry cough, vomiting. Significant hx of diabetes type 2, HTN, recent COVID-19 (Feb 2024). Found to be hypoxemic  -JR        Current Method of Nutrition regular textures;thin liquids  -JR        Precautions/Limitations, Vision  WFL;for purposes of eval  -JR        Precautions/Limitations, Hearing WFL;for purposes of eval  -JR        Prior Level of Function-Communication WFL  -JR        Prior Level of Function-Swallowing no diet consistency restrictions  -JR        Plans/Goals Discussed with patient  -JR        Barriers to Rehab none identified  -JR        Patient's Goals for Discharge patient did not state  -JR        Family Goals for Discharge family did not state  -JR           Pain    Additional Documentation Pain Scale: FACES Pre/Post-Treatment (Group)  -JR           Pain Scale: FACES Pre/Post-Treatment    Pain: FACES Scale, Pretreatment 0-->no hurt  -JR        Posttreatment Pain Rating 0-->no hurt  -JR           Oral Motor Structure and Function    Dentition Assessment natural, present and adequate  -JR        Secretion Management WNL/WFL  -JR        Mucosal Quality moist, healthy  -JR        Gag Response other (see comments)  not assessed  -JR        Volitional Swallow WFL  -JR        Volitional Cough WFL  -JR           Oral Musculature and Cranial Nerve Assessment    Oral Motor General Assessment WFL  -JR           General Eating/Swallowing Observations    Respiratory Support Currently in Use nasal cannula  -JR        Eating/Swallowing Skills self-fed  -JR        Positioning During Eating upright in bed  -JR        Utensils Used straw  -JR        Consistencies Trialed regular textures;thin liquids  -JR           Clinical Swallow Eval    Oral Prep Phase WFL  -JR        Oral Transit WFL  -JR        Oral Residue WFL  -JR        Pharyngeal Phase no overt signs/symptoms of pharyngeal impairment  -JR        Esophageal Phase unremarkable  -JR           SLP Evaluation Clinical Impression    SLP Swallowing Diagnosis swallow WFL/no suspected pharyngeal impairment  -JR        Functional Impact no impact on function  -JR        Rehab Potential/Prognosis, Swallowing good, to achieve stated therapy goals  -JR        Swallow Criteria for Skilled  Therapeutic Interventions Met no problems identified which require skilled intervention  -           Recommendations    Therapy Frequency (Swallow) evaluation only  -        Predicted Duration Therapy Intervention (Days) until discharge  -        SLP Diet Recommendation regular textures;thin liquids  -        Recommended Diagnostics No further SLP services recommended  -        Recommended Precautions and Strategies upright posture during/after eating;small bites of food and sips of liquid;alternate between small bites of food and sips of liquid;general aspiration precautions  -        Oral Care Recommendations Oral Care BID/PRN  -        SLP Rec. for Method of Medication Administration meds whole;with thin liquids  -        Monitor for Signs of Aspiration yes;notify SLP if any concerns  -        Anticipated Discharge Disposition (SLP) No further SLP services warranted  -                  User Key  (r) = Recorded By, (t) = Taken By, (c) = Cosigned By      Initials Name Effective Dates    Kimberly Arthur MS CCC-SLP 08/22/23 -                     EDUCATION  The patient has been educated in the following areas:   Dysphagia (Swallowing Impairment).                 Time Calculation:    Time Calculation- SLP       Row Name 03/12/24 0923             Time Calculation- [facility]    SLP Start Time 0856  -      SLP Stop Time 0922  -      SLP Time Calculation (min) 26 min  -      SLP Received On 03/12/24  -         Untimed Charges    SLP Eval/Re-eval  ST Eval Oral Pharyng Swallow - [id number]  -      [id number] Eval Oral Pharyng Swallow Minutes 26  -         Total Minutes    Untimed Charges Total Minutes 26  -       Total Minutes 26  -                User Key  (r) = Recorded By, (t) = Taken By, (c) = Cosigned By      Initials Name Provider Type    Kimberly Arthur MS CCC-SLP Speech and Language Pathologist                    Therapy Charges for Today       Code Description Service Date Service Provider  Modifiers Qty    [id number]  ST EVAL ORAL PHARYNG SWALLOW 2 3/12/2024 Kimberly Whiteside, MS CCC-SLP GN 1                 SLP Discharge Summary  Anticipated Discharge Disposition (SLP): No further SLP services warranted    Kimberly Whiteside MS CCC-SLP  3/12/2024

## 2024-03-12 NOTE — THERAPY EVALUATION
Patient Name: Kolby Michel  : 1955    MRN: [id number]                              Today's Date: 3/12/2024       Admit Date: 3/11/2024    Visit Dx:     ICD-10-CM ICD-9-CM   1. Dysphagia, unspecified type [R13.10]  R13.10 787.20   2. Impaired mobility [Z74.09]  Z74.09 799.89     Patient Active Problem List   Diagnosis    Acute respiratory failure with hypoxemia    Atypical pneumonia    DM (diabetes mellitus), type 2    Essential hypertension, benign    Obesity (BMI 30-39.9)     Past Medical History:   Diagnosis Date    Diabetes mellitus     Hyperlipidemia     Hypertension      History reviewed. No pertinent surgical history.   General Information       Row Name 24 1022          Physical Therapy Time and Intention    Document Type evaluation  DX; SOA and fevers leading to acute respiratory failure with hypoxemia. History of COVID-19 in 2024, HTN, obesity, TIIDM,  -ANA (germania) ADELA (yaya) ANA (rohini)     Mode of Treatment physical therapy  -ANA (germania) ADELA (yaya) ANA (rohini)       Row Name 24 1022          General Information    Patient Profile Reviewed yes  -ANA (germania) ADELA (yaya) ANA (c)     Prior Level of Function independent:;all household mobility;community mobility;gait;transfer;bed mobility;ADL's  -ANA (germania) ADELA (yaya) ANA (c)     Existing Precautions/Restrictions oxygen therapy device and L/min;other (see comments)  8L/ karen, h/o long covid and desaturation with mobility  -ANA     Barriers to Rehab medically complex;physical barrier;impaired sensation  -ANA muniz) ADELA (yaya) ANA (rohini)       Row Name 24 1022          Living Environment    People in Home spouse  -ANA muniz) ADELA (yaya) ANA (rohini)       Row Name 24 1022          Home Main Entrance    Number of Stairs, Main Entrance four  -ANA muniz) ADELA (yaya) ANA (rohini)     Stair Railings, Main Entrance railings on both sides of stairs;railings safe and in good condition  -ANA muniz) ADELA (yaya) ANA (rohini)       Row Name 24 1022          Stairs Within Home, Primary    Number of Stairs, Within Home, Primary none  -ANA  (r) AJ (t) ANA (c)     Stair Railings, Within Home, Primary none  -ANA (r) AJ (t) ANA (c)       Row Name 03/12/24 1022          Cognition    Orientation Status (Cognition) oriented x 4  -ANA (r) AJ (t) ANA (c)       Row Name 03/12/24 1022          Safety Issues, Functional Mobility    Impairments Affecting Function (Mobility) shortness of breath  -ANA (r) AJ (t) ANA (c)               User Key  (r) = Recorded By, (t) = Taken By, (c) = Cosigned By      Initials Name Provider Type    Evelio Wagoner, PT DPT Physical Therapist    Chay Escobar, PT Student PT Student                   Mobility       Row Name 03/12/24 1022          Sit-Stand Transfer    Sit-Stand [place] (Transfers) modified independence  -ANA (r) AJ (t) ANA (c)       Row Name 03/12/24 1022          Gait/Stairs (Locomotion)    00717 - Gait Training Minutes  8  -ANA (r) AJ (t) ANA (c)     Gait/Stairs Locomotion gait/ambulation independence;distance ambulated  -ANA (r) AJ (t) ANA (c)     [place] Level (Gait) 1 person assist;standby assist  1 person for equipment and SpO2 monitoring  -ANA (r) AJ (t) ANA (c)     Distance in Feet (Gait) 320  -ANA (r) AJ (t) ANA (c)     Pattern (Gait) step-through  -ANA (r) AJ (t) ANA (c)     Deviations/Abnormal Patterns (Gait) base of support, narrow;gm decreased;gait speed decreased  -ANA (r) AJ (t) ANA (c)     Negotiation (Stairs) other (see comments)  prgoress to stair negotiation in order to return home  -ANA (r) AJ (t) ANA (c)               User Key  (r) = Recorded By, (t) = Taken By, (c) = Cosigned By      Initials Name Provider Type    Evelio Wagoner, PT DPT Physical Therapist    Chay Escobar, PT Student PT Student                   Obj/Interventions       Row Name 03/12/24 1022          Range of Motion Comprehensive    General Range of Motion bilateral lower extremity ROM WFL  -ANA (r) AJ (t) ANA (c)       Row Name 03/12/24 1022          Strength Comprehensive (MMT)    General Manual Muscle Testing (MMT)  Assessment no strength deficits identified  -ANA (r) AJ (t) ANA (c)       Row Name 03/12/24 1022          Balance    Balance Assessment standing dynamic balance;sitting dynamic balance  -ANA (r) AJ (t) ANA (c)     Dynamic Sitting Balance independent  -ANA (r) AJ (t) ANA (c)     Position, Sitting Balance sitting in chair;supported  -ANA (r) AJ (t) ANA (c)     Dynamic Standing Balance independent;1 person to manage equipment  -ANA (r) AJ (t) ANA (c)     Position/Device Used, Standing Balance unsupported  -ANA (r) AJ (t) ANA (c)     Balance Interventions sitting;standing;sit to stand  -ANA (r) AJ (t) ANA (c)       Row Name 03/12/24 1022          Sensory Assessment (Somatosensory)    Sensory Assessment (Somatosensory) bilateral UE  -ANA (r) AJ (t) ANA (c)     Bilateral UE Sensory Assessment general sensation;impaired;other (see comments)  nueropathy  -ANA (r) AJ (t) ANA (c)               User Key  (r) = Recorded By, (t) = Taken By, (c) = Cosigned By      Initials Name Provider Type    Evelio Wagoner, PT DPT Physical Therapist    Chay Escobar, PT Student PT Student                   Goals/Plan       Row Name 03/12/24 1022          Gait Training Goal 1 (PT)    Activity/Assistive Device (Gait Training Goal 1, PT) gait (walking locomotion);improve balance and speed;increase endurance/gait distance;increase energy conservation  -ANA (r) AJ (t) ANA (c)     [place] Level (Gait Training Goal 1, PT) independent  -ANA (r) AJ (t) ANA (c)     Distance (Gait Training Goal 1, PT) 500'  -ANA (r) AJ (t) ANA (c)     Time Frame (Gait Training Goal 1, PT) long term goal (LTG);10 days  -ANA (r) AJ (t) ANA (c)     Progress/Outcome (Gait Training Goal 1, PT) new goal  -ANA (r) AJ (t) ANA (c)       Row Name 03/12/24 1022          Stairs Goal 1 (PT)    Activity/Assistive Device (Stairs Goal 1, PT) ascending stairs;descending stairs;using handrail, left;using handrail, right;improve balance and speed  -ANA (r) ADELA (t) ANA (c)     [place] Level/Cues Needed  (Stairs Goal 1, PT) modified independence  -ANA (r) AJ (t) ANA (c)     Number of Stairs (Stairs Goal 1, PT) 4  -ANA (r) AJ (t) ANA (c)     Time Frame (Stairs Goal 1, PT) long term goal (LTG);10 days  -ANA (r) AJ (t) ANA (c)     Progress/Outcome (Stairs Goal 1, PT) new goal  -ANA (r) AJ (t) ANA (c)       Row Name 03/12/24 1022          Therapy Assessment/Plan (PT)    Planned Therapy Interventions (PT) gait training;stair training;patient/family education;balance training;strengthening  -ANA               User Key  (r) = Recorded By, (t) = Taken By, (c) = Cosigned By      Initials Name Provider Type    Evelio Wagoner, PT DPT Physical Therapist    Chay Escobar, PT Student PT Student                   Clinical Impression       Row Name 03/12/24 1022          Pain    Pretreatment Pain Rating 0/10 - no pain  minor soreness in chest and thoracic area due to history of coughing and nausea and vomiting over past 3 days  -ANA (r) AJ (t) ANA (c)     Pain Intervention(s) Repositioned;Ambulation/increased activity;Nursing Notified  -ANA (r) AJ (t) ANA (c)       Row Name 03/12/24 1022          Pain Scale: FACES Pre/Post-Treatment    Pain: FACES Scale, Pretreatment 0-->no hurt  -ANA (r) AJ (t) ANA (c)     Posttreatment Pain Rating 0-->no hurt  -ANA (r) AJ (t) NAA (c)       Row Name 03/12/24 1022          Plan of Care Review    Plan of Care Reviewed With patient;spouse  -ANA (r) AJ (t) ANA (c)     Progress improving  -ANA (r) AJ (t) ANA (c)     Outcome Evaluation PT eval completed. Pt AOx4 while sitting in bedside chair. Pt reported minor soreness in chest and rib area due to dry heaving and coughing extensively over the past 3 days. Pt demo functional  5/5 strength in hip, knee and ankle. Pt demo normal ROM in both hip, knee, ankle but reported numbness and tingling in feet due to neuropathy. He states overall he feels much better and close to normal. Pt SpO2 94% on 8L/min prior to performing transfer and mobility. Pt sit to stand  independent but needed 1 person to manage equipment and monitor oxygen saturation. Pt tolerated 10' walk within room to use urinal and demo indepedence with standing static unsupported and demo SpO2 87%, which recovered within 1 minutes with proper breathing education and techniques. Pt demo functional gait training 300' CGA with desat to 86%, pt also asymptomatic and recovered at 2 minutes. Pt reported minor fatigue and RPE 2/10 at end of session and would benefit from performing stair negotiation with SpO2 monitoring to evaluate safe discharge. Pt sitting comfortably in bedside chair and educated to call for all OOB activites due to oxygen requirment and IV line. Pt goals to return home at d/c.  -ANA (r) AJ (t) ANA (c)       Row Name 03/12/24 1022          Therapy Assessment/Plan (PT)    Patient/Family Therapy Goals Statement (PT) go home and return to work  -ANA (r) AJ (t) ANA (c)     Rehab Potential (PT) good, to achieve stated therapy goals  -ANA (r) AJ (t) ANA (c)     Criteria for Skilled Interventions Met (PT) yes;meets criteria;skilled treatment is necessary  -ANA (r) AJ (t) ANA (c)     Therapy Frequency (PT) 2 times/day  -ANA (r) AJ (t) ANA (c)     Predicted Duration of Therapy Intervention (PT) until d/c  -ANA (r) AJ (t) ANA (c)       Row Name 03/12/24 1022          Vital Signs    Pre SpO2 (%) 94  -ANA (r) AJ (t) ANA (c)     O2 Delivery Pre Treatment supplemental O2  8L/min  -ANA (r) AJ (t) ANA (c)     Intra SpO2 (%) --  86-90%, 8L/min  -ANA (r) AJ (t) ANA (c)     O2 Delivery Intra Treatment supplemental O2  -ANA (r) AJ (t) ANA (c)     Post SpO2 (%) --  92% 8 L/min  -ANA (r) AJ (t) ANA (c)     O2 Delivery Post Treatment supplemental O2  -ANA (r) AJ (t) ANA (c)     Pre Patient Position Sitting  -ANA (r) AJ (t) ANA (c)     Intra Patient Position Standing  -ANA (r) AJ (t) ANA (c)     Post Patient Position Sitting  -ANA (r) AJ (t) ANA (c)     Recovery Time 2-3 minutes  -ANA (r) AJ (t) ANA (c)     Rest Breaks  2  -ANA (r) AJ (t) ANA (c)        Row Name 03/12/24 1022          Positioning and Restraints    Pre-Treatment Position sitting in chair/recliner  -ANA (r) AJ (t) ANA (c)     Post Treatment Position chair  -ANA (r) AJ (t) ANA (c)     In Chair notified nsg;sitting;call light within reach;encouraged to call for assist  -ANA (r) AJ (t) ANA (c)               User Key  (r) = Recorded By, (t) = Taken By, (c) = Cosigned By      Initials Name Provider Type    Evelio Wagoner, PT DPT Physical Therapist    Chay Escobar, PT Student PT Student                   Outcome Measures       Row Name 03/12/24 1200 03/12/24 1022       How much help from another person do you currently need...    Turning from your back to your side while in flat bed without using bedrails? 4  -AB 4  -ANA (r) AJ (t) ANA (c)    Moving from lying on back to sitting on the side of a flat bed without bedrails? 4  -AB 4  -ANA (r) AJ (t) ANA (c)    Moving to and from a bed to a chair (including a wheelchair)? 4  -AB 3  due to equipment  -ANA (r) AJ (t) ANA (c)    Standing up from a chair using your arms (e.g., wheelchair, bedside chair)? 4  -AB 4  -ANA (r) AJ (t) ANA (c)    Climbing 3-5 steps with a railing? 4  -AB 3  -ANA (r) AJ (t) ANA (c)    To walk in hospital room? 4  -AB 4  -ANA (r) AJ (t) ANA (c)    AM-PAC 6 Clicks Score (PT) 24  -AB 22  -ANA (r) AJ (t)    Highest Level of Mobility Goal 8 --> Walked 250 feet or more  -AB 7 --> Walk 25 feet or more  -ANA (r) AJ (t)      Row Name 03/12/24 0800 03/12/24 0400       How much help from another person do you currently need...    Turning from your back to your side while in flat bed without using bedrails? 4  -MG 4  -DR    Moving from lying on back to sitting on the side of a flat bed without bedrails? 4  -MG 4  -DR    Moving to and from a bed to a chair (including a wheelchair)? 4  -MG 4  -DR    Standing up from a chair using your arms (e.g., wheelchair, bedside chair)? 4  -MG 4  -DR    Climbing 3-5 steps with a railing? 4  -MG 4  -DR    To walk in  hospital room? 4  -MG 4  -DR    AM-PAC 6 Clicks Score (PT) 24  -MG 24  -DR    Highest Level of Mobility Goal 8 --> Walked 250 feet or more  -MG 8 --> Walked 250 feet or more  -DR      Row Name 03/12/24 1022          Functional Assessment    Outcome Measure Options AM-PAC 6 Clicks Basic Mobility (PT)  -ANA (r) AJ (t) ANA (c)               User Key  (r) = Recorded By, (t) = Taken By, (c) = Cosigned By      Initials Name Provider Type    Evelio Wagoner, PT DPT Physical Therapist    Duglas Sargent, RN Registered Nurse    Levar Duran, RN Registered Nurse    Clinton Weber RN Registered Nurse    Chay Escobar, PT Student PT Student                                 Physical Therapy Education       Title: PT OT SLP Therapies (Done)       Topic: Physical Therapy (Done)       Point: Mobility training (Done)       Learning Progress Summary             Patient Acceptance, E, VU by  at 3/12/2024 1152    Comment: stair negotiation safety with O2 demands, gait and long distance walks with proper breathing tecnhiques   Family Acceptance, E, VU by ADELA at 3/12/2024 1152    Comment: stair negotiation safety with O2 demands, gait and long distance walks with proper breathing tecnhiques                         Point: Precautions (Done)       Learning Progress Summary             Patient Acceptance, E, VU by ADELA at 3/12/2024 1152    Comment: stair negotiation safety with O2 demands, gait and long distance walks with proper breathing tecnhiques   Family Acceptance, E, VU by ADELA at 3/12/2024 1152    Comment: stair negotiation safety with O2 demands, gait and long distance walks with proper breathing tecnhiques                                         User Key       Initials Effective Dates Name Provider Type Discipline     02/22/24 -  Chay Mccollum, PT Student PT Student PT                  PT Recommendation and Plan     Plan of Care Reviewed With: patient, spouse  Progress: improving  Outcome Evaluation: PT lucinda completed.  Pt AOx4 while sitting in bedside chair. Pt reported minor soreness in chest and rib area due to dry heaving and coughing extensively over the past 3 days. Pt demo functional  5/5 strength in hip, knee and ankle. Pt demo normal ROM in both hip, knee, ankle but reported numbness and tingling in feet due to neuropathy. He states overall he feels much better and close to normal. Pt SpO2 94% on 8L/min prior to performing transfer and mobility. Pt sit to stand independent but needed 1 person to manage equipment and monitor oxygen saturation. Pt tolerated 10' walk within room to use urinal and demo indepedence with standing static unsupported and demo SpO2 87%, which recovered within 1 minutes with proper breathing education and techniques. Pt demo functional gait training 300' CGA with desat to 86%, pt also asymptomatic and recovered at 2 minutes. Pt reported minor fatigue and RPE 2/10 at end of session and would benefit from performing stair negotiation with SpO2 monitoring to evaluate safe discharge. Pt sitting comfortably in bedside chair and educated to call for all OOB activites due to oxygen requirment and IV line. Pt goals to return home at d/c.     Time Calculation:         PT Charges       Row Name 03/12/24 1153 03/12/24 1022          Time Calculation    Start Time 1050  -ANA (r) AJ (t) ANA (c) --     Stop Time 1121  +8 min for chart review  -ANA (r) AJ (t) ANA (c) --     Time Calculation (min) 31 min  -ANA (r) AJ (t) --     PT Received On 03/12/24  -ANA (r) AJ (t) ANA (c) --     PT Goal Re-Cert Due Date 03/22/24  -ANA (r) AJ (t) ANA (c) --        Time Calculation- PT    Total Timed Code Minutes- PT 39 minute(s)  -ANA (r) AJ (t) ANA (c) --        Timed Charges    17651 - Gait Training Minutes  -- 8  -ANA (r) AJ (t) ANA (c)        Total Minutes    Timed Charges Total Minutes -- 8  -ANA (r) AJ (t)      Total Minutes -- 8  -ANA (r) AJ (t)               User Key  (r) = Recorded By, (t) = Taken By, (c) = Cosigned By      Initials  Name Provider Type    Evelio Wagoner, PT DPT Physical Therapist    Chay Escobar, PT Student PT Student                      PT G-Codes  Outcome Measure Options: AM-PAC 6 Clicks Basic Mobility (PT)  AM-PAC 6 Clicks Score (PT): 24  PT Discharge Summary  Anticipated Discharge Disposition (PT): home, home with assist    Chay Mccollum, PT Student  3/12/2024

## 2024-03-12 NOTE — PROGRESS NOTES
[facility] Medicine Services  INPATIENT PROGRESS NOTE    Patient Name: Kolby Michel  Date of Admission: 3/11/2024  Today's Date: 03/12/24  Length of Stay: 1  Primary Care Physician: Marcos Figueroa MD    Subjective   Chief Complaint: Shortness of breath and vomiting  HPI   69-year-old male with history of diabetes mellitus type 2, hypertension, recent COVID-19 in February 2024, came to the hospital reporting symptoms for approximately 5 days, given by chills, fevers 101.2, shortness of breath, dry cough and episodes of vomiting. He went to another hospital ER, was found to be hypoxemic, started on BiPAP support, received Levaquin IV for management of pneumonia. He was transferred for higher level of care.     Feeling better today.  Patient remained in the ICU overnight.  On Vapotherm.  No fevers reported overnight.  No vomiting.  No chest pain.  Dyspnea has improved.      Review of Systems   All pertinent negatives and positives are as above. All other systems have been reviewed and are negative unless otherwise stated.     Objective    Temp:  [99 °F (37.2 °C)-100.5 °F (38.1 °C)] 99 °F (37.2 °C)  Heart Rate:  [] 99  Resp:  [22-24] 22  BP: ()/(56-82) 143/72  Physical Exam  Constitutional:       Appearance: Normal appearance.  Mild tachypnea.  On Vapotherm.  HENT:      Head: Normocephalic and atraumatic.      Nose: Nose normal.      Mouth/Throat:      Mouth: Mucous membranes are moist.      Pharynx: Oropharynx is clear.   Eyes:      Extraocular Movements: Extraocular movements intact.      Conjunctiva/sclera: Conjunctivae normal.      Pupils: Pupils are equal, round, and reactive to light.   Cardiovascular:      Rate and Rhythm: Normal rate and regular rhythm.      Pulses: Normal pulses.   Pulmonary:      Effort: No respiratory distress.      Breath sounds: Decreased breath sounds both bases, Rales in both bases.  No wheezing.    Abdominal:      General: Abdomen is  "obese..  Bowel sounds are normal.      Palpations: Abdomen is soft.      Tenderness: There is no guarding or rebound.   Musculoskeletal:         General: Normal range of motion.      Cervical back: Normal range of motion and neck supple.   Extremities:  No lower extremity edema.  Skin:     Capillary Refill: Capillary refill takes less than 2 seconds.      Coloration: Skin is not jaundiced.      Findings: No rash.   Neurological:      General: No focal deficit present.      Mental Status: Patient is alert, oriented to place time and person.     Sensory: No sensory deficit.      Motor: No weakness.      Coordination: Coordination normal.   Psychiatric:         Mood and Affect: Mood normal.         Behavior: Behavior normal.          Results Review:  I have reviewed the labs, radiology results, and diagnostic studies.    Laboratory Data:   Results from last 7 days   Lab Units 03/12/24  0242 03/11/24  1813   WBC 10*3/mm3 17.64* 18.60*   HEMOGLOBIN g/dL 13.3 12.8*   HEMATOCRIT % 41.3 38.8   PLATELETS 10*3/mm3 157 157        Results from last 7 days   Lab Units 03/12/24  0242 03/11/24  1812   SODIUM mmol/L 135* 133*   POTASSIUM mmol/L 3.9 4.1   CHLORIDE mmol/L 95* 94*   CO2 mmol/L 28.0 27.0   BUN mg/dL 37* 29*   CREATININE mg/dL 1.89* 1.93*   CALCIUM mg/dL 8.7 8.7   BILIRUBIN mg/dL 0.8  --    ALK PHOS U/L 92  --    ALT (SGPT) U/L 22  --    AST (SGOT) U/L 21  --    GLUCOSE mg/dL 154* 216*       Culture Data:   No results found for: \"BLOODCX\", \"URINECX\", \"WOUNDCX\", \"MRSACX\", \"RESPCX\", \"STOOLCX\"    Radiology Data:   Imaging Results (Last 24 Hours)       Procedure Component Value Units Date/Time    XR Chest 1 View [[id number]] Collected: 03/12/24 0650     Updated: 03/12/24 0655    Narrative:      EXAMINATION: XR CHEST 1 VW-     3/12/2024 2:30 AM     HISTORY: Resp failure     A frontal lordotic view of the chest is obtained. There is no previous  study for comparison.     The lungs are poorly expanded.     There are atelectatic " changes in the lungs bilaterally.     Left diaphragm is obscured and may suggest an adjacent infiltrate or  consolidation.     A probable small pleural effusion at left base.     There is no pulmonary congestion or pneumothorax.     The heart size is not optimally visualized or evaluated in this study.     No acute bony abnormality. There is a mild S-shaped scoliosis.       Impression:      1. Poor lung expansion. Atelectatic changes bilaterally. A probable left  lower lobar consolidation and a small left basal pleural effusion.              This report was signed and finalized on 3/12/2024 6:52 AM by Dr. Marva Schaffer MD.               I have reviewed the patient's current medications.     Assessment/Plan   Assessment  Active Hospital Problems    Diagnosis     **Acute respiratory failure with hypoxemia        Acute respiratory failure with hypoxemia  Community-acquired pneumonia  Acute kidney injury/renal insufficiency  Hypertension  Diabetes mellitus type 2  Morbid obesity, BMI 38.9        Initial blood gases performed March 11, 2024 at 10 AM, showed a pH of 7.53.  pCO2 35, pO2 45.  Saturation of oxygen at 86.2.  HCO3 29.6; this was performed on room air.      Blood gases performed on arrival to [facility].  pH 7.42.  pCO2 44.6.  pO2 70.  Bicarbonate 29.3.  O2 sat 94.6.  This was performed on 10 L high flow nasal cannula.     Last fever episode 6 PM yesterday.  38.1 Celsius      Treatment Plan  On high flow nasal cannula.  Continue IV fluids, follow renal function  Respiratory panel pending  Pulmonary consult requested this morning  Antibiotics ceftriaxone/azithromycin day 2  Can transfer to floor    Continue amlodipine.  Pepcid 40 mg daily for GI prophylaxis.  Restart Actos.  Continue statin.  Heparin subcutaneous for DVT prophylaxis      Medical Decision Making  Number and Complexity of problems:, Moderate complexity  Differential Diagnosis: See above    Conditions and Status        Condition is  improving.     [facility] Data  External documents reviewed: None  Cardiac tracing (EKG, telemetry) interpretation: Sinus rhythm  Radiology interpretation: Radiology reports reviewed  Labs reviewed: Yes  Any tests that were considered but not ordered: No     Decision rules/scores evaluated (example [id number], [place], etc): None     Discussed with: Patient and nurse staff     Care Planning  Shared decision making: With patient  Code status and discussions: Full code    Disposition  Social Determinants of Health that impact treatment or disposition: None  I expect the patient to be discharged to home in 2-3 days.     Electronically signed by Leobardo Avery MD, 03/12/24, 08:42 CDT.

## 2024-03-12 NOTE — CONSULTS
[facility] PULMONARY & CRITICAL CARE CONSULT - [facility]    24, 08:39 CDT  Patient Care Team:  Marcos Figueroa MD as PCP - General (Family Medicine)  Name: Kolby Michel  : 1955  MRN: [id number]  Contact Serial Number [id number]    Chief complaint: shortness of breath  HPI:  We have been consulted by Leobardo Avery MD to see this 69 y.o. male patient.  Mr. Michel was admitted with complaints of worsening shortness of breath and hypoxia. He tells me he had covid-19 around  that he felt was a light case and recovered without much issue. He reports 4 days ago he became very dyspneic with any activity. He associates nausea, vomiting and diarrhea with shortness of breath as well. He tells me this is the first time he has eaten in 4 days. He had covid-19 last year as well and required supplemental oxygen during that time. He denies any other lung history. He does not smoke. He does not vape. He is seen resting in bed. Oxygen saturation stable on 8l high flow cannula. He used BiPAP 18/8 overnight. He denies any history of christiano. ABG reviewed and stable Chest film reviewed. He is receiving azithromycin and ceftriaxone. No documented fevers. Recommendations per physician to follow.   Past Medical History:   has a past medical history of Diabetes mellitus, Hyperlipidemia, and Hypertension.   has no past surgical history on file.  Allergies   Allergen Reactions    Aspirin Angioedema    Penicillins Myalgia     Medications:  azithromycin, 500 mg, Oral, Q24H  cefTRIAXone, 1,000 mg, Intravenous, Q24H  famotidine, 40 mg, Oral, Daily  heparin (porcine), 5,000 Units, Subcutaneous, Q12H  insulin regular, 2-7 Units, Subcutaneous, Q6H  sodium chloride, 10 mL, Intravenous, Q12H         Family History:  History reviewed. No pertinent family history.  Father  of lung cancer, was a heavy smoker per patient report   Social History:   reports that he has never smoked. He has never used  smokeless tobacco. He reports that he does not drink alcohol and does not use drugs.  Review of Systems:  Positive for shortness of breath, cough, poor appetite, nausea, fever   Negative for weakness, pain   Physical Exam:  Temp:  [99 °F (37.2 °C)-100.5 °F (38.1 °C)] 99 °F (37.2 °C)  Heart Rate:  [] 99  Resp:  [22-24] 22  BP: ()/(56-82) 143/72  Intake/Output Summary (Last 24 hours) at 3/12/2024 0839  Last data filed at 3/12/2024 0529  Gross per 24 hour   Intake 100 ml   Output 300 ml   Net -200 ml         03/11/24  1800 03/12/24  0600   Weight: 134 kg (295 lb 3.1 oz) 134 kg (294 lb 8.6 oz)     SpO2 Percentage    03/12/24 0600 03/12/24 0630 03/12/24 0700   SpO2: 92% 92% 94%     Body mass index is 38.86 kg/m².   Physical Exam  Constitutional:       General: He is not in acute distress.     Interventions: Nasal cannula in place.      Comments: 8l hf    HENT:      Head: Normocephalic.      Nose: Nose normal.      Mouth/Throat:      Mouth: Mucous membranes are moist.   Eyes:      General: No scleral icterus.  Cardiovascular:      Rate and Rhythm: Tachycardia present. Rhythm irregular.   Pulmonary:      Effort: No respiratory distress.      Breath sounds: Rales present.   Abdominal:      General: There is no distension.   Musculoskeletal:      Right lower leg: No edema.      Left lower leg: No edema.   Neurological:      Mental Status: He is alert and oriented to person, place, and time.   Psychiatric:         Mood and Affect: Mood normal.         Behavior: Behavior is cooperative.       Electronically signed by JUSTYNA Garcia, 3/12/2024, 08:39 CDT       Physician Substantive Portion: Medical Decision Making  Result Review  Results from last 7 days   Lab Units 03/12/24  0242 03/11/24  1813   WBC 10*3/mm3 17.64* 18.60*   HEMOGLOBIN g/dL 13.3 12.8*   PLATELETS 10*3/mm3 157 157     Results from last 7 days   Lab Units 03/12/24  0242 03/11/24  1909 03/11/24  1812   SODIUM mmol/L 135*  --  133*   POTASSIUM  mmol/L 3.9  --  4.1   CO2 mmol/L 28.0  --  27.0   BUN mg/dL 37*  --  29*   CREATININE mg/dL 1.89*  --  1.93*   MAGNESIUM mg/dL 2.0  --  1.8   GLUCOSE mg/dL 154*  --  216*   LACTATE mmol/L  --  0.9  --      Results from last 7 days   Lab Units 03/11/24  1907   PH, ARTERIAL pH units 7.426   PCO2, ARTERIAL mm Hg 44.6   PO2 ART mm Hg 70.0*     Lab Results   Component Value Date    PROBNP 348.0 03/12/2024     Microbiology Results (last 10 days)       Procedure Component Value - Date/Time    S. Pneumo Ag Urine or CSF - Urine, Urine, Clean Catch [[id number]]  (Normal) Collected: 03/12/24 1134    Lab Status: Final result Specimen: Urine, Clean Catch Updated: 03/12/24 1209     Strep Pneumo Ag Negative    Respiratory Panel PCR w/COVID-19(SARS-CoV-2) JUDITH/SELENE/LYLE/PAD/COR/NEERU In-House, NP Swab in UTM/VTM, 2 HR TAT - Swab, Nasopharynx [[id number]]  (Normal) Collected: 03/12/24 0831    Lab Status: Final result Specimen: Swab from Nasopharynx Updated: 03/12/24 1007     ADENOVIRUS, PCR Not Detected     Coronavirus 229E Not Detected     Coronavirus HKU1 Not Detected     Coronavirus NL63 Not Detected     Coronavirus OC43 Not Detected     COVID19 Not Detected     Human Metapneumovirus Not Detected     Human Rhinovirus/Enterovirus Not Detected     Influenza A PCR Not Detected     Influenza B PCR Not Detected     Parainfluenza Virus 1 Not Detected     Parainfluenza Virus 2 Not Detected     Parainfluenza Virus 3 Not Detected     Parainfluenza Virus 4 Not Detected     RSV, PCR Not Detected     Bordetella pertussis pcr Not Detected     Bordetella parapertussis PCR Not Detected     Chlamydophila pneumoniae PCR Not Detected     Mycoplasma pneumo by PCR Not Detected    Narrative:      In the setting of a positive respiratory panel with a viral infection PLUS a negative procalcitonin without other underlying concern for bacterial infection, consider observing off antibiotics or discontinuation of antibiotics and continue supportive care. If the  respiratory panel is positive for atypical bacterial infection (Bordetella pertussis, Chlamydophila pneumoniae, or Mycoplasma pneumoniae), consider antibiotic de-escalation to target atypical bacterial infection.    Blood Culture - Blood, Arm, Left [[id number]]  (Normal) Collected: 03/11/24 1909    Lab Status: Preliminary result Specimen: Blood from Arm, Left Updated: 03/12/24 1930     Blood Culture No growth at 24 hours    Blood Culture - Blood, Arm, Right [[id number]]  (Normal) Collected: 03/11/24 1908    Lab Status: Preliminary result Specimen: Blood from Arm, Right Updated: 03/12/24 1930     Blood Culture No growth at 24 hours             Recent radiology:   Imaging Results (Last 72 Hours)       Procedure Component Value Units Date/Time    US Renal Bilateral [[id number]] Collected: 03/12/24 1547     Updated: 03/12/24 1551    Narrative:      EXAM/TECHNIQUE: US RENAL BILATERAL-     INDICATION: MALLIKA; R13.10-Dysphagia, unspecified; Z74.09-Other reduced  mobility     COMPARISON: None     FINDINGS:     Abdominal aorta and IVC appear unremarkable.     Right kidney is 13.9 cm in length and demonstrates normal cortical  thickness and echogenicity. No shadowing calculi or hydronephrosis.     Left kidney is 12.9 cm in length and demonstrates normal cortical  thickness and echogenicity. No shadowing calculi or hydronephrosis.     No focal urinary bladder abnormality.       Impression:      Negative for hydronephrosis or renal atrophy.     This report was signed and finalized on 3/12/2024 3:48 PM by Dr. Josué Oseguera MD.       XR Chest 1 View [[id number]] Collected: 03/12/24 0650     Updated: 03/12/24 0655    Narrative:      EXAMINATION: XR CHEST 1 VW-     3/12/2024 2:30 AM     HISTORY: Resp failure     A frontal lordotic view of the chest is obtained. There is no previous  study for comparison.     The lungs are poorly expanded.     There are atelectatic changes in the lungs bilaterally.     Left diaphragm is obscured and  may suggest an adjacent infiltrate or  consolidation.     A probable small pleural effusion at left base.     There is no pulmonary congestion or pneumothorax.     The heart size is not optimally visualized or evaluated in this study.     No acute bony abnormality. There is a mild S-shaped scoliosis.       Impression:      1. Poor lung expansion. Atelectatic changes bilaterally. A probable left  lower lobar consolidation and a small left basal pleural effusion.              This report was signed and finalized on 3/12/2024 6:52 AM by Dr. Marva Schaffer MD.               Personal review of imaging : CXR shows decreased lung volumes, atelectasis, mild  Other test results:  none available  Independent review of ekg: LAD, and q waves, present also in 2023ep      Pulmonary Assessment:  Shortness of breath  Acute resp failure with hypoxia, very severe, improving, but still threat to life and bodily function   Hx Covid, possible long covid  Atelectasis, acute, may account for part or most of the hypoxia  Possible pneumonia, presumptive dx, could also account for the hypoxia.  Other possibility post-Covid related organizing pneumonitis  Elevated creat  Leukocytosis  Chronically abn EKG  Hyponatremia improving    Recommend/plan:   Empiric steroids iv, empiric abx.  high risk of morbidity from additional diagnostic testing or treatment with risk of severe hyperglycemia, electrolyte disturbance  Mobilization as tolerated  Legionella antigen and pneumococcal antigen  Respiratory culture  Continue monitor o2 sat, supplemental oxygen as needed.  O2 need is lessening  Renal workup per others  Get hrct chest if process does not resolve acutely  He was needing bipap on arrival, can discontinue now      This visit was performed by both a physician and an [facility] RN.  I personally evaluated and examined the patient.  I performed all aspects of the medical decision making as documented.  Electronically signed by Esequiel  Sacha Zamudio MD, 3/12/2024, 20:41 CDT

## 2024-03-12 NOTE — PLAN OF CARE
Goal Outcome Evaluation:  Plan of Care Reviewed With: patient, spouse        Progress: improving  Outcome Evaluation: PT eval completed. Pt AOx4 while sitting in bedside chair. Pt reported minor soreness in chest and rib area due to dry heaving and coughing extensively over the past 3 days. Pt demo functional  5/5 strength in hip, knee and ankle. Pt demo normal ROM in both hip, knee, ankle but reported numbness and tingling in feet due to neuropathy. He states overall he feels much better and close to normal. Pt SpO2 94% on 8L/min prior to performing transfer and mobility. Pt sit to stand independent but needed 1 person to manage equipment and monitor oxygen saturation. Pt tolerated 10' walk within room to use urinal and demo indepedence with standing static unsupported and demo SpO2 87%, which recovered within 1 minutes with proper breathing education and techniques. Pt demo functional gait training 300' CGA with desat to 86%, pt also asymptomatic and recovered at 2 minutes. Pt reported minor fatigue and RPE 2/10 at end of session and would benefit from performing stair negotiation with SpO2 monitoring to evaluate safe discharge. Pt sitting comfortably in bedside chair and educated to call for all OOB activites due to oxygen requirment and IV line. Pt goals to return home at d/c.      Anticipated Discharge Disposition (PT): home, home with assist

## 2024-03-13 LAB
ALBUMIN SERPL-MCNC: 3.3 G/DL (ref 3.5–5.2)
ALBUMIN/GLOB SERPL: 0.9 G/DL
ALP SERPL-CCNC: 103 U/L (ref 39–117)
ALT SERPL W P-5'-P-CCNC: 22 U/L (ref 1–41)
ANION GAP SERPL CALCULATED.3IONS-SCNC: 9 MMOL/L (ref 5–15)
AST SERPL-CCNC: 20 U/L (ref 1–40)
BASOPHILS # BLD AUTO: 0.07 10*3/MM3 (ref 0–0.2)
BASOPHILS NFR BLD AUTO: 0.5 % (ref 0–1.5)
BILIRUB SERPL-MCNC: 0.5 MG/DL (ref 0–1.2)
BUN SERPL-MCNC: 34 MG/DL (ref 8–23)
BUN/CREAT SERPL: 31.5 (ref 7–25)
CALCIUM SPEC-SCNC: 8.6 MG/DL (ref 8.6–10.5)
CHLORIDE SERPL-SCNC: 100 MMOL/L (ref 98–107)
CO2 SERPL-SCNC: 28 MMOL/L (ref 22–29)
CREAT SERPL-MCNC: 1.08 MG/DL (ref 0.76–1.27)
DEPRECATED RDW RBC AUTO: 53 FL (ref 37–54)
EGFRCR SERPLBLD CKD-EPI 2021: 73.8 ML/MIN/1.73
EOSINOPHIL # BLD AUTO: 0.09 10*3/MM3 (ref 0–0.4)
EOSINOPHIL NFR BLD AUTO: 0.6 % (ref 0.3–6.2)
ERYTHROCYTE [DISTWIDTH] IN BLOOD BY AUTOMATED COUNT: 15.6 % (ref 12.3–15.4)
GLOBULIN UR ELPH-MCNC: 3.5 GM/DL
GLUCOSE BLDC GLUCOMTR-MCNC: 122 MG/DL (ref 70–130)
GLUCOSE BLDC GLUCOMTR-MCNC: 171 MG/DL (ref 70–130)
GLUCOSE BLDC GLUCOMTR-MCNC: 180 MG/DL (ref 70–130)
GLUCOSE BLDC GLUCOMTR-MCNC: 200 MG/DL (ref 70–130)
GLUCOSE BLDC GLUCOMTR-MCNC: 207 MG/DL (ref 70–130)
GLUCOSE SERPL-MCNC: 144 MG/DL (ref 65–99)
HCT VFR BLD AUTO: 38.8 % (ref 37.5–51)
HGB BLD-MCNC: 12.4 G/DL (ref 13–17.7)
IMM GRANULOCYTES # BLD AUTO: 0.18 10*3/MM3 (ref 0–0.05)
IMM GRANULOCYTES NFR BLD AUTO: 1.2 % (ref 0–0.5)
LYMPHOCYTES # BLD AUTO: 0.95 10*3/MM3 (ref 0.7–3.1)
LYMPHOCYTES NFR BLD AUTO: 6.2 % (ref 19.6–45.3)
M PNEUMO IGM SER IA-ACNC: <770 U/ML (ref 0–769)
MCH RBC QN AUTO: 29.3 PG (ref 26.6–33)
MCHC RBC AUTO-ENTMCNC: 32 G/DL (ref 31.5–35.7)
MCV RBC AUTO: 91.7 FL (ref 79–97)
MONOCYTES # BLD AUTO: 1.58 10*3/MM3 (ref 0.1–0.9)
MONOCYTES NFR BLD AUTO: 10.3 % (ref 5–12)
NEUTROPHILS NFR BLD AUTO: 12.5 10*3/MM3 (ref 1.7–7)
NEUTROPHILS NFR BLD AUTO: 81.2 % (ref 42.7–76)
NRBC BLD AUTO-RTO: 0 /100 WBC (ref 0–0.2)
PLATELET # BLD AUTO: 158 10*3/MM3 (ref 140–450)
PMV BLD AUTO: 9.2 FL (ref 6–12)
POTASSIUM SERPL-SCNC: 3.7 MMOL/L (ref 3.5–5.2)
PROT SERPL-MCNC: 6.8 G/DL (ref 6–8.5)
RBC # BLD AUTO: 4.23 10*6/MM3 (ref 4.14–5.8)
SODIUM SERPL-SCNC: 137 MMOL/L (ref 136–145)
WBC NRBC COR # BLD AUTO: 15.37 10*3/MM3 (ref 3.4–10.8)

## 2024-03-13 PROCEDURE — 63710000001 INSULIN LISPRO (HUMAN) PER 5 UNITS: Performed by: FAMILY MEDICINE

## 2024-03-13 PROCEDURE — 63710000001 INSULIN REGULAR HUMAN PER 5 UNITS: Performed by: FAMILY MEDICINE

## 2024-03-13 PROCEDURE — 25010000002 CEFTRIAXONE PER 250 MG: Performed by: FAMILY MEDICINE

## 2024-03-13 PROCEDURE — 85025 COMPLETE CBC W/AUTO DIFF WBC: CPT | Performed by: FAMILY MEDICINE

## 2024-03-13 PROCEDURE — 94799 UNLISTED PULMONARY SVC/PX: CPT

## 2024-03-13 PROCEDURE — 99232 SBSQ HOSP IP/OBS MODERATE 35: CPT | Performed by: INTERNAL MEDICINE

## 2024-03-13 PROCEDURE — 97110 THERAPEUTIC EXERCISES: CPT

## 2024-03-13 PROCEDURE — 82948 REAGENT STRIP/BLOOD GLUCOSE: CPT

## 2024-03-13 PROCEDURE — 80053 COMPREHEN METABOLIC PANEL: CPT | Performed by: FAMILY MEDICINE

## 2024-03-13 PROCEDURE — 25010000002 HEPARIN (PORCINE) PER 1000 UNITS: Performed by: FAMILY MEDICINE

## 2024-03-13 RX ORDER — LOPERAMIDE HYDROCHLORIDE 2 MG/1
2 CAPSULE ORAL ONCE
Status: COMPLETED | OUTPATIENT
Start: 2024-03-13 | End: 2024-03-13

## 2024-03-13 RX ORDER — INSULIN LISPRO 100 [IU]/ML
2-7 INJECTION, SOLUTION INTRAVENOUS; SUBCUTANEOUS
Status: DISCONTINUED | OUTPATIENT
Start: 2024-03-13 | End: 2024-03-15

## 2024-03-13 RX ADMIN — INSULIN LISPRO 3 UNITS: 100 INJECTION, SOLUTION INTRAVENOUS; SUBCUTANEOUS at 21:57

## 2024-03-13 RX ADMIN — SERTRALINE 50 MG: 50 TABLET, FILM COATED ORAL at 09:30

## 2024-03-13 RX ADMIN — LOPERAMIDE HYDROCHLORIDE 2 MG: 2 CAPSULE ORAL at 21:01

## 2024-03-13 RX ADMIN — HEPARIN SODIUM 5000 UNITS: 5000 INJECTION INTRAVENOUS; SUBCUTANEOUS at 09:31

## 2024-03-13 RX ADMIN — GABAPENTIN 600 MG: 300 CAPSULE ORAL at 21:02

## 2024-03-13 RX ADMIN — ROSUVASTATIN CALCIUM 10 MG: 10 TABLET, FILM COATED ORAL at 09:30

## 2024-03-13 RX ADMIN — INSULIN HUMAN 2 UNITS: 100 INJECTION, SOLUTION PARENTERAL at 01:23

## 2024-03-13 RX ADMIN — FAMOTIDINE 40 MG: 20 TABLET, FILM COATED ORAL at 09:30

## 2024-03-13 RX ADMIN — INSULIN HUMAN 3 UNITS: 100 INJECTION, SOLUTION PARENTERAL at 17:50

## 2024-03-13 RX ADMIN — GABAPENTIN 600 MG: 300 CAPSULE ORAL at 09:30

## 2024-03-13 RX ADMIN — CEFTRIAXONE SODIUM 1000 MG: 1 INJECTION, POWDER, FOR SOLUTION INTRAMUSCULAR; INTRAVENOUS at 21:01

## 2024-03-13 RX ADMIN — HEPARIN SODIUM 5000 UNITS: 5000 INJECTION INTRAVENOUS; SUBCUTANEOUS at 21:01

## 2024-03-13 RX ADMIN — AMLODIPINE BESYLATE 5 MG: 5 TABLET ORAL at 09:30

## 2024-03-13 RX ADMIN — Medication 10 ML: at 09:31

## 2024-03-13 RX ADMIN — INSULIN HUMAN 2 UNITS: 100 INJECTION, SOLUTION PARENTERAL at 12:11

## 2024-03-13 RX ADMIN — PIOGLITAZONE 30 MG: 30 TABLET ORAL at 09:30

## 2024-03-13 RX ADMIN — Medication 10 ML: at 21:02

## 2024-03-13 RX ADMIN — AZITHROMYCIN 500 MG: 250 TABLET, FILM COATED ORAL at 21:01

## 2024-03-13 NOTE — PLAN OF CARE
Goal Outcome Evaluation:      Patient actively works thru LE exercises. SATs on 3 lpm fom 95%-88% during activity. Worked on purse lip breathing.

## 2024-03-13 NOTE — NURSING NOTE
Pt was up to bathroom with the assistance of nurse aide.  Pt told to pull for assistance when done.  Pt states he decided to try to change his underwear before calling out and he got tangled up in his IV tubing.  Pt states he grabbed the IV pole and it rolled so he leaned against the wall and slid down to sit in the floor.  Pt states he did not hurt anything and simply sat in the floor.  Nurse did not see any signs of injury and VSS.  Will continue to monitor.

## 2024-03-13 NOTE — PROGRESS NOTES
[facility] Medicine Services  INPATIENT PROGRESS NOTE    Patient Name: Kolby Michel  Date of Admission: 3/11/2024  Today's Date: 03/13/24  Length of Stay: 2  Primary Care Physician: Marcos Figueroa MD    Subjective   Chief Complaint: Shortness of breath and vomiting  HPI   69-year-old male with history of diabetes mellitus type 2, hypertension, recent COVID-19 in February 2024, came to the hospital reporting symptoms for approximately 5 days, given by chills, fevers 101.2, shortness of breath, dry cough and episodes of vomiting. He went to another hospital ER, was found to be hypoxemic, started on BiPAP support, received Levaquin IV for management of pneumonia. He was transferred for higher level of care.     No fevers reported overnight.  No vomiting.  No chest pain.  State that he has improved.  He still on oxygen via nasal cannula.  Transferred to the floor.      Review of Systems   All pertinent negatives and positives are as above. All other systems have been reviewed and are negative unless otherwise stated.     Objective    Temp:  [97.7 °F (36.5 °C)-99.6 °F (37.6 °C)] 98.4 °F (36.9 °C)  Heart Rate:  [] 101  Resp:  [18-20] 20  BP: (124-148)/(58-75) 143/62  Physical Exam  Constitutional:       Appearance: Normal appearance.  On nasal cannula, sitting in recliner.  No significant distress.  Tremors in both upper extremities.  HENT:      Head: Normocephalic and atraumatic.      Nose: Nose normal.      Mouth/Throat:      Mouth: Mucous membranes are moist.      Pharynx: Oropharynx is clear.   Eyes:      Extraocular Movements: Extraocular movements intact.      Conjunctiva/sclera: Conjunctivae normal.      Pupils: Pupils are equal, round, and reactive to light.   Cardiovascular:      Rate and Rhythm: Normal rate and regular rhythm.      Pulses: Normal pulses.   Pulmonary:      Effort: No respiratory distress.      Breath sounds: Decreased breath sounds both bases, Rales  "in both bases.  No wheezing.    Abdominal:      General: Abdomen is obese..  Bowel sounds are normal.      Palpations: Abdomen is soft.      Tenderness: There is no guarding or rebound.   Musculoskeletal:         General: Normal range of motion.      Cervical back: Normal range of motion and neck supple.   Extremities:  No lower extremity edema.  Skin:     Capillary Refill: Capillary refill takes less than 2 seconds.      Coloration: Skin is not jaundiced.      Findings: No rash.   Neurological:      General: No focal deficit present.      Mental Status: Patient is alert, oriented to place time and person.     Sensory: No sensory deficit.      Motor: No weakness.      Coordination: Coordination normal.   Psychiatric:         Mood and Affect: Mood normal.         Behavior: Behavior normal.          Results Review:  I have reviewed the labs, radiology results, and diagnostic studies.    Laboratory Data:   Results from last 7 days   Lab Units 03/13/24  0144 03/12/24  0242 03/11/24  1813   WBC 10*3/mm3 15.37* 17.64* 18.60*   HEMOGLOBIN g/dL 12.4* 13.3 12.8*   HEMATOCRIT % 38.8 41.3 38.8   PLATELETS 10*3/mm3 158 157 157        Results from last 7 days   Lab Units 03/13/24  0144 03/12/24  0242 03/11/24  1812   SODIUM mmol/L 137 135* 133*   POTASSIUM mmol/L 3.7 3.9 4.1   CHLORIDE mmol/L 100 95* 94*   CO2 mmol/L 28.0 28.0 27.0   BUN mg/dL 34* 37* 29*   CREATININE mg/dL 1.08 1.89* 1.93*   CALCIUM mg/dL 8.6 8.7 8.7   BILIRUBIN mg/dL 0.5 0.8  --    ALK PHOS U/L 103 92  --    ALT (SGPT) U/L 22 22  --    AST (SGOT) U/L 20 21  --    GLUCOSE mg/dL 144* 154* 216*       Culture Data:   No results found for: \"BLOODCX\", \"URINECX\", \"WOUNDCX\", \"MRSACX\", \"RESPCX\", \"STOOLCX\"    Radiology Data:   Imaging Results (Last 24 Hours)       Procedure Component Value Units Date/Time    US Renal Bilateral [[id number]] Collected: 03/12/24 1547     Updated: 03/12/24 1551    Narrative:      EXAM/TECHNIQUE: US RENAL BILATERAL-     INDICATION: MALLIKA; " R13.10-Dysphagia, unspecified; Z74.09-Other reduced  mobility     COMPARISON: None     FINDINGS:     Abdominal aorta and IVC appear unremarkable.     Right kidney is 13.9 cm in length and demonstrates normal cortical  thickness and echogenicity. No shadowing calculi or hydronephrosis.     Left kidney is 12.9 cm in length and demonstrates normal cortical  thickness and echogenicity. No shadowing calculi or hydronephrosis.     No focal urinary bladder abnormality.       Impression:      Negative for hydronephrosis or renal atrophy.     This report was signed and finalized on 3/12/2024 3:48 PM by Dr. Josué Oseguera MD.               I have reviewed the patient's current medications.     Assessment/Plan   Assessment  Active Hospital Problems    Diagnosis     **Acute respiratory failure with hypoxemia     Atypical pneumonia     DM (diabetes mellitus), type 2     Essential hypertension, benign     Obesity (BMI 30-39.9)        Acute respiratory failure with hypoxemia  Community-acquired pneumonia  Acute kidney injury/renal insufficiency resolving  Hypertension  Diabetes mellitus type 2  Morbid obesity, BMI 39        ABGs March 11, 2024 at 10 AM, showed a pH of 7.53.  pCO2 35, pO2 45.  Saturation of oxygen at 86.2.  HCO3 29.6; this was performed on room air.      Blood gases performed on arrival to [facility].  pH 7.42.  pCO2 44.6.  pO2 70.  Bicarbonate 29.3.  O2 sat 94.6.  This was performed on 10 L high flow nasal cannula.     Last fever episode 6 PM 3/11/24.  38.1 Celsius    Currently on 3 L via nasal cannula.    Glucose controlled.  Creatinine down from 1.89 to 1.08.  No electrolyte normalities.  White blood cell count trending down but still present.  15,000      Treatment Plan    DC IV fluids.  Respiratory panel negative  Pulmonary commendations regarding hypoxemia  Antibiotics ceftriaxone/azithromycin day 3  Will require walking pulse oximetry when he is ready for discharge    Continue amlodipine.    Pepcid  40 mg daily for GI prophylaxis.  Restarted Actos.  Sliding scale of insulin  Continue statin.  Heparin subcutaneous for DVT prophylaxis  PT OT    Medical Decision Making  Number and Complexity of problems:, Moderate complexity  Differential Diagnosis: See above    Conditions and Status        Condition is improving.     [facility] Data  External documents reviewed: None  Cardiac tracing (EKG, telemetry) interpretation: Sinus rhythm  Radiology interpretation: Radiology reports reviewed  Labs reviewed: Yes  Any tests that were considered but not ordered: No     Decision rules/scores evaluated (example [id number], [place], etc): None     Discussed with: Patient and nurse staff     Care Planning  Shared decision making: With patient  Code status and discussions: Full code    Disposition  Social Determinants of Health that impact treatment or disposition: None  I expect the patient to be discharged to home in 2  days.     Electronically signed by Leobardo Avery MD, 03/13/24, 09:21 CDT.

## 2024-03-13 NOTE — PROGRESS NOTES
Patient placed on room air for walk ox.  Sat immediately started dropping.  Placed patient back on oxygen until sat came back up to 90%.  As soon as patient got up to walk his sat would dramatically drop.  Called to Dr Avery to notify him of this, and he stated to hold walk ox until 03/14 or 03/15.

## 2024-03-13 NOTE — PROGRESS NOTES
[facility] – [place], [place] PULMONARY AND CRITICAL CARE PROGRESS NOTE - [facility]    Patient: Kolby Michel  1955   MR# [id number]   Acct# [id number]  03/13/24   07:25 CDT  Referring Provider: Leobardo Avery MD    Chief Complaint: Shortness of breath    Interval history: He is afebrile. He is now on 3L NC with sat of 90%. WBC trending down at 15.37. Strep Pneumo and Legionella urine antigens negative. Respiratory viral panel negative. No new imaging for review. Remains on Azithromycin and Ceftriaxone. He is on no steroids. He is sitting up in the bedside chair and notes he is feeling better. He does note he still has a neb machine at home but no oxygen. He also notes he has had issues with desaturation since his first covid infection 3-4 years ago. He denies sleep apnea.     Meds:  amLODIPine, 5 mg, Oral, Daily  azithromycin, 500 mg, Oral, Q24H  cefTRIAXone, 1,000 mg, Intravenous, Q24H  famotidine, 40 mg, Oral, Daily  gabapentin, 600 mg, Oral, Q12H  heparin (porcine), 5,000 Units, Subcutaneous, Q12H  insulin regular, 2-7 Units, Subcutaneous, Q6H  pioglitazone, 30 mg, Oral, Daily  rosuvastatin, 10 mg, Oral, Daily  sertraline, 50 mg, Oral, Daily  sodium chloride, 10 mL, Intravenous, Q12H           Physical Exam:  SpO2 Percentage    03/13/24 0315 03/13/24 0604 03/13/24 0620   SpO2: 92% 93% 90%     Body mass index is 39.71 kg/m².   Temp:  [97.7 °F (36.5 °C)-99.6 °F (37.6 °C)] 98.5 °F (36.9 °C)  Heart Rate:  [] 101  Resp:  [18-20] 20  BP: (114-148)/(58-79) 128/74  Intake/Output Summary (Last 24 hours) at 3/13/2024 0725  Last data filed at 3/13/2024 0604  Gross per 24 hour   Intake 300 ml   Output 325 ml   Net -25 ml     Physical Exam  Constitutional:       Appearance: Normal appearance.      Interventions: Nasal cannula in place.      Comments: 3L   HENT:      Head: Normocephalic and atraumatic.   Cardiovascular:      Rate and Rhythm: Normal rate. Rhythm irregular.      Heart sounds: No murmur heard.     No  friction rub. No gallop.   Pulmonary:      Effort: Pulmonary effort is normal.      Breath sounds: Decreased breath sounds present.   Abdominal:      General: Bowel sounds are normal. There is no distension.      Palpations: Abdomen is soft.   Musculoskeletal:      Right lower leg: No edema.      Left lower leg: No edema.   Skin:     General: Skin is warm and dry.   Neurological:      General: No focal deficit present.      Mental Status: He is alert and oriented to person, place, and time.   Psychiatric:         Mood and Affect: Mood normal.         Behavior: Behavior normal.         Electronically signed by JUSTYNA Welch, 3/13/2024, 07:28 CDT      Physician Substantive Portion: Medical Decision Making to follow:      Result Review    Laboratory Data:  Results from last 7 days   Lab Units 03/13/24  0144 03/12/24 0242 03/11/24  1813   WBC 10*3/mm3 15.37* 17.64* 18.60*   HEMOGLOBIN g/dL 12.4* 13.3 12.8*   PLATELETS 10*3/mm3 158 157 157     Results from last 7 days   Lab Units 03/13/24  0144 03/12/24 0242 03/11/24  1909 03/11/24  1812   SODIUM mmol/L 137 135*  --  133*   POTASSIUM mmol/L 3.7 3.9  --  4.1   CHLORIDE mmol/L 100 95*  --  94*   CO2 mmol/L 28.0 28.0  --  27.0   BUN mg/dL 34* 37*  --  29*   CREATININE mg/dL 1.08 1.89*  --  1.93*   CALCIUM mg/dL 8.6 8.7  --  8.7   ALK PHOS U/L 103 92  --   --    ALT (SGPT) U/L 22 22  --   --    AST (SGOT) U/L 20 21  --   --    LACTATE mmol/L  --   --  0.9  --          Lab 03/13/24  0144 03/12/24 0242 03/11/24  1812   GLUCOSE 144* 154* 216*     Results from last 7 days   Lab Units 03/11/24  1907   PH, ARTERIAL pH units 7.426   PCO2, ARTERIAL mm Hg 44.6   PO2 ART mm Hg 70.0*         Lab 03/12/24  0242   PROBNP 348.0     Microbiology Results (last 10 days)       Procedure Component Value - Date/Time    S. Pneumo Ag Urine or CSF - Urine, Urine, Clean Catch [[id number]]  (Normal) Collected: 03/12/24 0837    Lab Status: Final result Specimen: Urine, Clean Catch  Updated: 03/12/24 1209     Strep Pneumo Ag Negative    Legionella Antigen, Urine - Urine, Urine, Clean Catch [[id number]]  (Normal) Collected: 03/12/24 1134    Lab Status: Final result Specimen: Urine, Clean Catch Updated: 03/12/24 2120     LEGIONELLA ANTIGEN, URINE Negative    Respiratory Panel PCR w/COVID-19(SARS-CoV-2) JUDITH/SELENE/LYLE/PAD/COR/NEERU In-House, NP Swab in UTM/VTM, 2 HR TAT - Swab, Nasopharynx [[id number]]  (Normal) Collected: 03/12/24 0831    Lab Status: Final result Specimen: Swab from Nasopharynx Updated: 03/12/24 1007     ADENOVIRUS, PCR Not Detected     Coronavirus 229E Not Detected     Coronavirus HKU1 Not Detected     Coronavirus NL63 Not Detected     Coronavirus OC43 Not Detected     COVID19 Not Detected     Human Metapneumovirus Not Detected     Human Rhinovirus/Enterovirus Not Detected     Influenza A PCR Not Detected     Influenza B PCR Not Detected     Parainfluenza Virus 1 Not Detected     Parainfluenza Virus 2 Not Detected     Parainfluenza Virus 3 Not Detected     Parainfluenza Virus 4 Not Detected     RSV, PCR Not Detected     Bordetella pertussis pcr Not Detected     Bordetella parapertussis PCR Not Detected     Chlamydophila pneumoniae PCR Not Detected     Mycoplasma pneumo by PCR Not Detected    Narrative:      In the setting of a positive respiratory panel with a viral infection PLUS a negative procalcitonin without other underlying concern for bacterial infection, consider observing off antibiotics or discontinuation of antibiotics and continue supportive care. If the respiratory panel is positive for atypical bacterial infection (Bordetella pertussis, Chlamydophila pneumoniae, or Mycoplasma pneumoniae), consider antibiotic de-escalation to target atypical bacterial infection.    Blood Culture - Blood, Arm, Left [[id number]]  (Normal) Collected: 03/11/24 1909    Lab Status: Preliminary result Specimen: Blood from Arm, Left Updated: 03/13/24 1930     Blood Culture No growth at 2 days     Mycoplasma Pneumoniae Antibody, IgM - Blood, Arm, Left [[id number]] Collected: 03/11/24 1909    Lab Status: Final result Specimen: Blood from Arm, Left Updated: 03/13/24 1513     M pneumoniae IgM Abs <770 U/mL      Comment:                              Negative            <770  Clinically significant amount of M. pneumoniae antibody  not detected.                               Low Positive   770 - 950  M. pneumoniae specific IgM presumptively detected.  It  is recommended that another sample be collected 1-2  weeks later to assure reactivity.                               Positive            >950  Highly significant amount of M. pneumoniae specific  IgM antibody detected.       Narrative:      Performed at:  [address] [place] [address]  [id number]  : Enrrique Ernandez PhD, Phone:  [phone]    Blood Culture - Blood, Arm, Right [[id number]]  (Normal) Collected: 03/11/24 1908    Lab Status: Preliminary result Specimen: Blood from Arm, Right Updated: 03/13/24 1930     Blood Culture No growth at 2 days           Recent films:  US Renal Bilateral    Result Date: 3/12/2024  EXAM/TECHNIQUE: US RENAL BILATERAL-  INDICATION: MALLIKA; R13.10-Dysphagia, unspecified; Z74.09-Other reduced mobility  COMPARISON: None  FINDINGS:  Abdominal aorta and IVC appear unremarkable.  Right kidney is 13.9 cm in length and demonstrates normal cortical thickness and echogenicity. No shadowing calculi or hydronephrosis.  Left kidney is 12.9 cm in length and demonstrates normal cortical thickness and echogenicity. No shadowing calculi or hydronephrosis.  No focal urinary bladder abnormality.      Impression: Negative for hydronephrosis or renal atrophy.  This report was signed and finalized on 3/12/2024 3:48 PM by Dr. Josué Oseguera MD.      XR Chest 1 View    Result Date: 3/12/2024  EXAMINATION: XR CHEST 1 VW-  3/12/2024 2:30 AM  HISTORY: Resp failure  A frontal lordotic view of the chest is obtained. There is no  previous study for comparison.  The lungs are poorly expanded.  There are atelectatic changes in the lungs bilaterally.  Left diaphragm is obscured and may suggest an adjacent infiltrate or consolidation.  A probable small pleural effusion at left base.  There is no pulmonary congestion or pneumothorax.  The heart size is not optimally visualized or evaluated in this study.  No acute bony abnormality. There is a mild S-shaped scoliosis.      Impression: 1. Poor lung expansion. Atelectatic changes bilaterally. A probable left lower lobar consolidation and a small left basal pleural effusion.     This report was signed and finalized on 3/12/2024 6:52 AM by Dr. Marva Schaffer MD.            Pulmonary Assessment:  Acute resp failure with hypoxia better.  O2 needs markedly better  Scoliosis  Atelectasis  Hx Covid  Elevated creat better  Hyponatremia resolved    Recommend/plan:   Continue supportive care  Continue empiric abx  Continue monitoring oxygen, titrate off oxygen if possible.    This visit was performed by both a physician and an Advanced Practice RN.  I performed all aspects of the medical decision making as documented.  Electronically signed by Esequiel Zamudio MD, 3/13/2024, 21:06 CDT

## 2024-03-14 ENCOUNTER — APPOINTMENT (OUTPATIENT)
Dept: NUCLEAR MEDICINE | Facility: HOSPITAL | Age: 69
DRG: 189 | End: 2024-03-14
Payer: MEDICARE

## 2024-03-14 PROBLEM — J18.9 PNEUMONIA, UNSPECIFIED ORGANISM: Status: ACTIVE | Noted: 2024-03-14

## 2024-03-14 LAB
ANION GAP SERPL CALCULATED.3IONS-SCNC: 7 MMOL/L (ref 5–15)
BUN SERPL-MCNC: 29 MG/DL (ref 8–23)
BUN/CREAT SERPL: 30.2 (ref 7–25)
CALCIUM SPEC-SCNC: 8.7 MG/DL (ref 8.6–10.5)
CHLORIDE SERPL-SCNC: 102 MMOL/L (ref 98–107)
CO2 SERPL-SCNC: 29 MMOL/L (ref 22–29)
CREAT SERPL-MCNC: 0.96 MG/DL (ref 0.76–1.27)
DEPRECATED RDW RBC AUTO: 52.2 FL (ref 37–54)
EGFRCR SERPLBLD CKD-EPI 2021: 85 ML/MIN/1.73
ERYTHROCYTE [DISTWIDTH] IN BLOOD BY AUTOMATED COUNT: 15.5 % (ref 12.3–15.4)
GLUCOSE BLDC GLUCOMTR-MCNC: 146 MG/DL (ref 70–130)
GLUCOSE BLDC GLUCOMTR-MCNC: 187 MG/DL (ref 70–130)
GLUCOSE BLDC GLUCOMTR-MCNC: 216 MG/DL (ref 70–130)
GLUCOSE BLDC GLUCOMTR-MCNC: 267 MG/DL (ref 70–130)
GLUCOSE SERPL-MCNC: 169 MG/DL (ref 65–99)
HCT VFR BLD AUTO: 37.3 % (ref 37.5–51)
HGB BLD-MCNC: 12 G/DL (ref 13–17.7)
MCH RBC QN AUTO: 29.4 PG (ref 26.6–33)
MCHC RBC AUTO-ENTMCNC: 32.2 G/DL (ref 31.5–35.7)
MCV RBC AUTO: 91.4 FL (ref 79–97)
PLATELET # BLD AUTO: 176 10*3/MM3 (ref 140–450)
PMV BLD AUTO: 9 FL (ref 6–12)
POTASSIUM SERPL-SCNC: 4 MMOL/L (ref 3.5–5.2)
RBC # BLD AUTO: 4.08 10*6/MM3 (ref 4.14–5.8)
SODIUM SERPL-SCNC: 138 MMOL/L (ref 136–145)
WBC NRBC COR # BLD AUTO: 13.15 10*3/MM3 (ref 3.4–10.8)

## 2024-03-14 PROCEDURE — 85027 COMPLETE CBC AUTOMATED: CPT | Performed by: FAMILY MEDICINE

## 2024-03-14 PROCEDURE — 63710000001 INSULIN LISPRO (HUMAN) PER 5 UNITS: Performed by: FAMILY MEDICINE

## 2024-03-14 PROCEDURE — 25010000002 HEPARIN (PORCINE) PER 1000 UNITS: Performed by: FAMILY MEDICINE

## 2024-03-14 PROCEDURE — 25010000002 METHYLPREDNISOLONE PER 40 MG: Performed by: NURSE PRACTITIONER

## 2024-03-14 PROCEDURE — 99232 SBSQ HOSP IP/OBS MODERATE 35: CPT | Performed by: INTERNAL MEDICINE

## 2024-03-14 PROCEDURE — 94618 PULMONARY STRESS TESTING: CPT

## 2024-03-14 PROCEDURE — 97116 GAIT TRAINING THERAPY: CPT

## 2024-03-14 PROCEDURE — 80048 BASIC METABOLIC PNL TOTAL CA: CPT | Performed by: FAMILY MEDICINE

## 2024-03-14 PROCEDURE — 82948 REAGENT STRIP/BLOOD GLUCOSE: CPT

## 2024-03-14 RX ORDER — METHYLPREDNISOLONE SODIUM SUCCINATE 40 MG/ML
40 INJECTION, POWDER, LYOPHILIZED, FOR SOLUTION INTRAMUSCULAR; INTRAVENOUS EVERY 6 HOURS
Status: DISCONTINUED | OUTPATIENT
Start: 2024-03-14 | End: 2024-03-16

## 2024-03-14 RX ORDER — DOXYCYCLINE 100 MG/1
100 TABLET ORAL EVERY 12 HOURS SCHEDULED
Qty: 6 TABLET | Refills: 0 | Status: SHIPPED | OUTPATIENT
Start: 2024-03-14 | End: 2024-03-19 | Stop reason: HOSPADM

## 2024-03-14 RX ORDER — DOXYCYCLINE 100 MG/1
100 TABLET ORAL EVERY 12 HOURS SCHEDULED
Status: COMPLETED | OUTPATIENT
Start: 2024-03-14 | End: 2024-03-16

## 2024-03-14 RX ADMIN — GABAPENTIN 600 MG: 300 CAPSULE ORAL at 08:28

## 2024-03-14 RX ADMIN — Medication 10 ML: at 22:49

## 2024-03-14 RX ADMIN — HEPARIN SODIUM 5000 UNITS: 5000 INJECTION INTRAVENOUS; SUBCUTANEOUS at 08:27

## 2024-03-14 RX ADMIN — DOXYCYCLINE 100 MG: 100 TABLET, FILM COATED ORAL at 13:57

## 2024-03-14 RX ADMIN — ROSUVASTATIN CALCIUM 10 MG: 10 TABLET, FILM COATED ORAL at 08:28

## 2024-03-14 RX ADMIN — SERTRALINE 50 MG: 50 TABLET, FILM COATED ORAL at 08:27

## 2024-03-14 RX ADMIN — DOXYCYCLINE 100 MG: 100 TABLET, FILM COATED ORAL at 22:49

## 2024-03-14 RX ADMIN — HEPARIN SODIUM 5000 UNITS: 5000 INJECTION INTRAVENOUS; SUBCUTANEOUS at 22:48

## 2024-03-14 RX ADMIN — FAMOTIDINE 40 MG: 20 TABLET, FILM COATED ORAL at 08:28

## 2024-03-14 RX ADMIN — Medication 10 ML: at 08:28

## 2024-03-14 RX ADMIN — INSULIN LISPRO 2 UNITS: 100 INJECTION, SOLUTION INTRAVENOUS; SUBCUTANEOUS at 17:33

## 2024-03-14 RX ADMIN — METHYLPREDNISOLONE SODIUM SUCCINATE 40 MG: 40 INJECTION, POWDER, FOR SOLUTION INTRAMUSCULAR; INTRAVENOUS at 18:13

## 2024-03-14 RX ADMIN — INSULIN LISPRO 4 UNITS: 100 INJECTION, SOLUTION INTRAVENOUS; SUBCUTANEOUS at 22:48

## 2024-03-14 RX ADMIN — INSULIN LISPRO 3 UNITS: 100 INJECTION, SOLUTION INTRAVENOUS; SUBCUTANEOUS at 12:33

## 2024-03-14 RX ADMIN — PIOGLITAZONE 30 MG: 30 TABLET ORAL at 08:27

## 2024-03-14 RX ADMIN — GABAPENTIN 600 MG: 300 CAPSULE ORAL at 22:48

## 2024-03-14 RX ADMIN — AMLODIPINE BESYLATE 5 MG: 5 TABLET ORAL at 08:28

## 2024-03-14 NOTE — PROCEDURES
Exercise Oximetry    Patient Name:Kolby Michel   MRN: [id number]   Date: 03/14/24             ROOM AIR BASELINE   SpO2% 86   Heart Rate    Blood Pressure      EXERCISE ON ROOM AIR SpO2% EXERCISE ON O2 @ 12 LPM SpO2%   1 MINUTE  1 MINUTE    90   2 MINUTES  2 MINUTES    90   3 MINUTES  3 MINUTES    4 MINUTES  4 MINUTES    5 MINUTES  5 MINUTES    6 MINUTES  6 MINUTES               Distance Walked   Distance Walked 300 feet   Dyspnea (Daisy Scale)   Dyspnea (Daisy Scale)   Fatigue (Daisy Scale)   Fatigue (Daisy Scale)   SpO2% Post Exercise   SpO2% Post Exercise   HR Post Exercise  HR Post Exercise   Time to Recovery   Time to Recovery       Comments:  Pt sat dropped very quickly when placed on room air to 86%.  Placed patient back on oxygen.  While walking, his oxygen had to be increased to 12 LPM NC to keep sat at 90%.  Patient was very short of breath when finished but stated he did not walk that far at home..  MALACHI Gamble notified.

## 2024-03-14 NOTE — CASE MANAGEMENT/SOCIAL WORK
Continued Stay Note  ENRICO Williamson     Patient Name: Kolby Michel  MRN: [id number]  Today's Date: 3/14/2024    Admit Date: 3/11/2024    Plan: Home with 02   Discharge Plan       Row Name 03/14/24 1619       Plan    Plan Home with 02    Patient/Family in Agreement with Plan yes    Provided Post Acute Provider List? Yes    Post Acute Provider List DME Supplier    Delivered To Patient    Method of Delivery Telephone    Plan Comments Pt has been setup with 02 from [facility].  They will bring portable tank to pt's room shortly.                   Discharge Codes    No documentation.                 Expected Discharge Date and Time       Expected Discharge Date Expected Discharge Time    Mar 14, 2024               ARNULFO MathiasW

## 2024-03-14 NOTE — DISCHARGE PLACEMENT REQUEST
"Gary Torres (69 y.o. Male)       Date of Birth   1955    Social Security Number       Address   [address] [facility] [address]    Home Phone   [phone]    MRN   [id number]       Amish   Religion    Marital Status                               Admission Date   3/11/24    Admission Type   Urgent    Admitting Provider   Leobardo Avery MD    Attending Provider   Leobardo Avery MD    Department, Room/Bed   [address], 411/1       Discharge Date       Discharge Disposition       Discharge Destination                                 Attending Provider: Leobardo Avery MD    Allergies: Aspirin, Penicillins    Isolation: None   Infection: None   Code Status: CPR    Ht: 185.4 cm (73\")   Wt: 137 kg (301 lb 3.2 oz)    Admission Cmt: None   Principal Problem: Acute respiratory failure with hypoxemia [J96.01]                   Active Insurance as of 3/11/2024       Primary Coverage       Payor Plan Insurance Group Employer/Plan Group    MEDICARE MEDICARE A & B        Payor Plan Address Payor Plan Phone Number Payor Plan Fax Number Effective Dates    [address] [phone]  1/1/2019 - None Entered    [facility] [id number]         Subscriber Name Subscriber Birth Date Member ID       GARY TORRES 1955 [id number]               Secondary Coverage       Payor Plan Insurance Group Employer/Plan Group    [facility]   [facility] SUP              [id number]       Coverage Address Coverage Phone Number Coverage Fax Number Effective Dates    [address] [address] [phone]  6/1/2023 - None Entered    [facility] [address]         Subscriber Name Subscriber Birth Date Member ID       GARY TORRES 1955 [id number]                     Emergency Contacts        (Rel.) Home Phone Work Phone Mobile Phone    crispin torres (Spouse) [phone] -- --          Home Oxygen Therapy [EQ60] (Order [id number])  Order  Date: 3/14/2024 Department: [address] " Ordering/Authorizing: Leobardo Avery MD     Order History  Outpatient  Date/Time Action Taken User Additional Information   03/14/24 1156 Pend Leobardo Avery MD    03/14/24 1324 Sign Leobardo Avery MD      Order Details    Frequency Duration Priority Order Class   None None Routine External     Start Date/Time    Start Date   03/14/24     Order Information    Order Date Service Start Date Start Time   03/14/24 Medicine 03/14/24      Reference Links    Associated Reports   View Encounter     Order Questions    Question Answer   Delivery Modality Nasal Cannula   Liters Per Minute: 3   Duration: Continuous   Equipment  Oxygen Concentrator &  &  Portable Gaseous Oxygen System & Portable Oxygen Contents Gaseous &  Conserving Regulator   Face to Face Evaluation Date 3/14/2024   Length of Need 3 Months            Source Order Set / Preference List    Order Set   [facility] GEN EXPRESS DISCHARGE [[id number]]           Clinical Indications     ICD-10-CM ICD-9-CM   Acute respiratory failure with hypoxemia    J96.01 518.81                             Reprint Order Requisition    Home Oxygen Therapy (Order #[id number]) on 3/14/24         Encounter    View Encounter                Order Provider Info        Office phone Pager E-mail   Ordering User  Leobardo Avery MD  [phone] -- --   Authorizing Provider  Leobardo Avery MD  [phone] -- --   Attending Provider  Leobardo Avery MD  [phone] -- --   Pended By (on 03/14/2024 1156)  Leobardo Avery MD  [phone] -- --     Tracking Reports    Cosign Tracking Order Transmittal Tracking     Authorized by:  Leobardo Avery MD  (NPI: [id number])                Lab Component SmartPhrase Guide    Home Oxygen Therapy (Order #[id number]) on 3/14/24          History & Physical        Leobardo Avery MD at 03/11/24 1826              [facility] Medicine Services  HISTORY AND PHYSICAL    Date of  Admission: 3/11/2024  Primary Care Physician: Marcos Figueroa MD    Subjective   Primary Historian: Patient    Chief Complaint: Shortness of breath and fevers    History of Present Illness  69-year-old male with history of diabetes mellitus type 2, hypertension, recent COVID-19 in February 2024, came to the hospital reporting symptoms for approximately 5 days, given by chills, fevers 101.2, shortness of breath, dry cough and episodes of vomiting.  He went to another hospital ER, was found to be hypoxemic, started on BiPAP support, received Levaquin IV for management of pneumonia.  He was transferred for higher level of care.  Initially admitted to the intensive care unit.  Patient found on BiPAP support, which was removed, placed on nasal cannula, saturating 98 to 91% on 4 L.  He was started on Vapotherm with improvement.  He reports mild dyspnea.  Laboratory tests from the other facility were reviewed.        Review of Systems   Otherwise complete ROS reviewed and negative except as mentioned in the HPI.    Past Medical History:   Past Medical History:   Diagnosis Date    Diabetes mellitus     Hyperlipidemia     Hypertension      Past Surgical History:  Left upper extremity fracture ORIF.  History reviewed. No pertinent surgical history.  Social History:  reports that he has never smoked. He has never used smokeless tobacco. He reports that he does not drink alcohol and does not use drugs.    Family History: family history is not on file.   Reviewed    Allergies:  Allergies   Allergen Reactions    Aspirin Angioedema    Penicillins Myalgia       Medications:  Prior to Admission medications    Medication Sig Start Date End Date Taking? Authorizing Provider   amLODIPine (NORVASC) 5 MG tablet Take 1 tablet by mouth Daily.    Provider, MD Sean   gabapentin (NEURONTIN) 600 MG tablet Take 1 tablet by mouth 2 (Two) Times a Day.    ProviderSean MD   glipizide (GLUCOTROL) 10 MG tablet Take 1 tablet by  mouth Daily.    Sean Cronin MD   lisinopril-hydrochlorothiazide (PRINZIDE,ZESTORETIC) 20-25 MG per tablet Take 1 tablet by mouth Daily.    Sean Cronin MD   pioglitazone (ACTOS) 30 MG tablet Take 1 tablet by mouth Daily.    Sean Cronin MD   rosuvastatin (CRESTOR) 10 MG tablet Take 1 tablet by mouth Daily.    Sean Cronin MD   sertraline (ZOLOFT) 50 MG tablet Take 1 tablet by mouth Daily.    Sean Cronin MD     I have utilized all available immediate resources to obtain, update, or review the patient's current medications (including all prescriptions, over-the-counter products, herbals, cannabis/cannabidiol products, and vitamin/mineral/dietary (nutritional) supplements).    Objective     Vital Signs: /61   Pulse 106   Temp 100.5 °F (38.1 °C) (Oral)   Wt 134 kg (295 lb 3.1 oz)   SpO2 94%   BMI 38.95 kg/m²   Physical Exam   Constitutional:       Appearance: Normal appearance.  Mild tachypnea.  On Vapotherm.  HENT:      Head: Normocephalic and atraumatic.      Nose: Nose normal.      Mouth/Throat:      Mouth: Mucous membranes are moist.      Pharynx: Oropharynx is clear.   Eyes:      Extraocular Movements: Extraocular movements intact.      Conjunctiva/sclera: Conjunctivae normal.      Pupils: Pupils are equal, round, and reactive to light.   Cardiovascular:      Rate and Rhythm: Normal rate and regular rhythm.      Pulses: Normal pulses.   Pulmonary:      Effort: No respiratory distress.      Breath sounds: Decreased breath sounds on the right base, Rales in both bases.  No wheezing.    Abdominal:      General: Abdomen is obese..  Bowel sounds are normal.      Palpations: Abdomen is soft.      Tenderness: There is no guarding or rebound.   Musculoskeletal:         General: Normal range of motion.      Cervical back: Normal range of motion and neck supple.   Extremities:  No lower extremity edema.  Skin:     Capillary Refill: Capillary refill takes less than 2  seconds.      Coloration: Skin is not jaundiced.      Findings: No rash.   Neurological:      General: No focal deficit present.      Mental Status: Patient is alert, oriented to place time and person.     Sensory: No sensory deficit.      Motor: No weakness.      Coordination: Coordination normal.   Psychiatric:         Mood and Affect: Mood normal.         Behavior: Behavior normal.           Results Reviewed:  Lab Results (last 24 hours)       Procedure Component Value Units Date/Time    CBC & Differential [[id number]]  (Abnormal) Collected: 03/11/24 1813    Specimen: Blood Updated: 03/11/24 1823    Narrative:      The following orders were created for panel order CBC & Differential.  Procedure                               Abnormality         Status                     ---------                               -----------         ------                     CBC Auto Differential[[id number]]        Abnormal            Final result                 Please view results for these tests on the individual orders.    CBC Auto Differential [[id number]]  (Abnormal) Collected: 03/11/24 1813    Specimen: Blood Updated: 03/11/24 1823     WBC 18.60 10*3/mm3      RBC 4.34 10*6/mm3      Hemoglobin 12.8 g/dL      Hematocrit 38.8 %      MCV 89.4 fL      MCH 29.5 pg      MCHC 33.0 g/dL      RDW 15.7 %      RDW-SD 51.8 fl      MPV 9.1 fL      Platelets 157 10*3/mm3      Neutrophil % 87.5 %      Lymphocyte % 2.7 %      Monocyte % 8.4 %      Eosinophil % 0.0 %      Basophil % 0.3 %      Immature Grans % 1.1 %      Neutrophils, Absolute 16.26 10*3/mm3      Lymphocytes, Absolute 0.51 10*3/mm3      Monocytes, Absolute 1.56 10*3/mm3      Eosinophils, Absolute 0.00 10*3/mm3      Basophils, Absolute 0.06 10*3/mm3      Immature Grans, Absolute 0.21 10*3/mm3      nRBC 0.0 /100 WBC     Basic Metabolic Panel [[id number]] Collected: 03/11/24 1812    Specimen: Blood Updated: 03/11/24 1819    Magnesium [[id number]] Collected: 03/11/24 1812    Specimen:  Blood Updated: 03/11/24 1819    Hemoglobin A1c [[id number]] Collected: 03/11/24 1812    Specimen: Blood Updated: 03/11/24 1819          Imaging Results (Last 24 Hours)       ** No results found for the last 24 hours. **          I have personally reviewed and interpreted the radiology studies and ECG obtained at time of admission.     Assessment / Plan   Assessment:   Active Hospital Problems    Diagnosis     **Acute respiratory failure with hypoxemia      Acute respiratory failure with hypoxemia  Community-acquired pneumonia  Hypertension  Diabetes mellitus type 2  Morbid obesity, BMI 38.9      Initial blood gases performed March 11, 2024 at 10 AM, showed a pH of 7.53.  pCO2 35, pO2 45.  Saturation of oxygen at 86.2.  HCO3 29.6; this was performed on room air.    At 2 PM repeat ABGs showed a pH of 7.44.  pCO2 43.  pO2 82.  Saturation of oxygen 96.4%.  FiO2 was 50% at that time.    Glucose was 256.  Creatinine 1.2.  Potassium 3.7.  Sodium 133.  CO2 27.3.  AST 18 ALT 26.  Total bilirubin 1.3 alkaline phosphatase 107.      Treatment Plan  The patient will be admitted to my service here at [facility].  I have requested laboratory test to be performed on arrival.  CBC, BMP; I have requested a chest x-ray and blood gases stat.  Patient will continue oxygen supplementation, Vapotherm 35% FiO2.  Nebulizations with Atrovent as needed.  Patient stated he is allergic to penicillins, but flulike symptoms when he was a child.  For this reason, no history of anaphylactic reactions, will start ceftriaxone and azithromycin.  Will follow laboratory tests, ABGs and chest x-ray requested.  The patient may be able to transfer to the floor, not requiring of noninvasive ventilatory support.  Sliding scale of insulin.  Dysphagia screening.  Start diet diabetic.  Heparin subcutaneous for DVT prophylaxis.    Medical Decision Making  Number and Complexity of problems: 5, Moderate complexity  Differential Diagnosis: See  above    Conditions and Status        Condition is improving.     [facility] Data  External documents reviewed: Yes, see above  Cardiac tracing (EKG, telemetry) interpretation: Electrocardiogram requested  Radiology interpretation: Radiology reports pending  Labs reviewed: Yes from the other facility  Any tests that were considered but not ordered: None     Decision rules/scores evaluated (example [id number], Wells, etc): None     Discussed with: Patient and nurse staff     Care Planning  Shared decision making: With patient  Code status and discussions: Full code    Disposition  Social Determinants of Health that impact treatment or disposition: None  Estimated length of stay is 2 days.     I confirmed that the patient's advanced care plan is present, code status is documented, and a surrogate decision maker is listed in the patient's medical record.     The patient's surrogate decision maker is family member.     The patient was seen and examined by me on March 11, 2024 at 6:15 PM.    Electronically signed by Leobardo Avery MD, 03/11/24, 18:26 CDT.               Electronically signed by Leobardo Avery MD at 03/11/24 1833          Physician Progress Notes (most recent note)        Leobardo Avery MD at 03/14/24 1156              [facility] Medicine Services  INPATIENT PROGRESS NOTE    Patient Name: Kolby Michel  Date of Admission: 3/11/2024  Today's Date: 03/14/24  Length of Stay: 3  Primary Care Physician: Marcos Figueroa MD    Subjective   Chief Complaint: Shortness of breath and vomiting  HPI   69-year-old male with history of diabetes mellitus type 2, hypertension, recent COVID-19 in February 2024, came to the hospital reporting symptoms for approximately 5 days, given by chills, fevers 101.2, shortness of breath, dry cough and episodes of vomiting. He went to another hospital ER, was found to be hypoxemic, started on BiPAP support, received Levaquin IV for management  of pneumonia. He was transferred for higher level of care.     No fevers reported overnight.    No vomiting.  No chest pain.  He still on oxygen via nasal cannula.  Ambulating. Oxygen drops significantly on exertion.      Review of Systems   All pertinent negatives and positives are as above. All other systems have been reviewed and are negative unless otherwise stated.     Objective    Temp:  [97.8 °F (36.6 °C)-99 °F (37.2 °C)] 97.9 °F (36.6 °C)  Heart Rate:  [] 100  Resp:  [18-20] 18  BP: (131-146)/(61-72) 146/61  Physical Exam  Constitutional:       Appearance: Normal appearance.  On nasal cannula, sitting in recliner.  No significant distress.  Tremors in both upper extremities.  HENT:      Head: Normocephalic and atraumatic.      Nose: Nose normal.      Mouth/Throat:      Mouth: Mucous membranes are moist.      Pharynx: Oropharynx is clear.   Eyes:      Extraocular Movements: Extraocular movements intact.      Conjunctiva/sclera: Conjunctivae normal.      Pupils: Pupils are equal, round, and reactive to light.   Cardiovascular:      Rate and Rhythm: Normal rate and regular rhythm.      Pulses: Normal pulses.   Pulmonary:      Effort: No respiratory distress.      Breath sounds: Decreased breath sounds mostly right base. Rales in both bases.  No wheezing.    Abdominal:      General: Abdomen is obese..  Bowel sounds are normal.      Palpations: Abdomen is soft.      Tenderness: There is no guarding or rebound.   Musculoskeletal:         General: Normal range of motion.      Cervical back: Normal range of motion and neck supple.   Extremities:  No lower extremity edema.  Skin:     Capillary Refill: Capillary refill takes less than 2 seconds.      Coloration: Skin is not jaundiced.      Findings: No rash.   Neurological:      General: No focal deficit present.      Mental Status: Patient is alert, oriented to place time and person.     Sensory: No sensory deficit.      Motor: No weakness.      Coordination:  "Coordination normal.   Psychiatric:         Mood and Affect: Mood normal.         Behavior: Behavior normal.          Results Review:  I have reviewed the labs, radiology results, and diagnostic studies.    Laboratory Data:   Results from last 7 days   Lab Units 03/14/24 0426 03/13/24 0144 03/12/24  0242   WBC 10*3/mm3 13.15* 15.37* 17.64*   HEMOGLOBIN g/dL 12.0* 12.4* 13.3   HEMATOCRIT % 37.3* 38.8 41.3   PLATELETS 10*3/mm3 176 158 157        Results from last 7 days   Lab Units 03/14/24 0426 03/13/24  0144 03/12/24  0242   SODIUM mmol/L 138 137 135*   POTASSIUM mmol/L 4.0 3.7 3.9   CHLORIDE mmol/L 102 100 95*   CO2 mmol/L 29.0 28.0 28.0   BUN mg/dL 29* 34* 37*   CREATININE mg/dL 0.96 1.08 1.89*   CALCIUM mg/dL 8.7 8.6 8.7   BILIRUBIN mg/dL  --  0.5 0.8   ALK PHOS U/L  --  103 92   ALT (SGPT) U/L  --  22 22   AST (SGOT) U/L  --  20 21   GLUCOSE mg/dL 169* 144* 154*       Culture Data:   No results found for: \"BLOODCX\", \"URINECX\", \"WOUNDCX\", \"MRSACX\", \"RESPCX\", \"STOOLCX\"    Radiology Data:   Imaging Results (Last 24 Hours)       ** No results found for the last 24 hours. **            I have reviewed the patient's current medications.     Assessment/Plan   Assessment  Active Hospital Problems    Diagnosis     **Acute respiratory failure with hypoxemia     Pneumonia, unspecified organism     Atypical pneumonia     DM (diabetes mellitus), type 2     Essential hypertension, benign     Obesity (BMI 30-39.9)        Acute respiratory failure with hypoxemia  Community-acquired pneumonia, atypical  Acute kidney injury/renal insufficiency resolving  Hypertension  Diabetes mellitus type 2  Morbid obesity, BMI 39        ABGs March 11, 2024 at 10 AM, showed a pH of 7.53.  pCO2 35, pO2 45.  Saturation of oxygen at 86.2.  HCO3 29.6; this was performed on room air.      Blood gases performed on arrival to [facility].  pH 7.42.  pCO2 44.6.  pO2 70.  Bicarbonate 29.3.  O2 sat 94.6.  This was performed on 10 L high flow nasal " cannula.     No more fevers. Last fever episode 6 PM 3/11/24.      Glucose controlled.  Creatinine down from 1.89 to 1.08.  No electrolyte normalities.  White blood cell count trending down but still present.  15,000      Treatment Plan    Respiratory panel negative    Pulmonary commendations noted.    Antibiotics ceftriaxone day 4/azithromycin completed 3 days    Unable to perform imaging studies as patient does not tolerate decubitus position; have tried and does not want to do it.    Arrange Oxygen for home  Needs Pulmonary follow up in 2 weeks; patient should have bronchoscopy and additional imaging if possible to evaluate cause of persistent hypoxia.    Continue antihypertensive medications.  Continue Actos.   Continue statin.  Heparin subcutaneous for DVT prophylaxis      Evaluate oxygen need with walking test and discharge home.    Medical Decision Making  Number and Complexity of problems:, Moderate complexity  Differential Diagnosis: See above    Conditions and Status        Condition is improving.     [facility] Data  External documents reviewed: None  Cardiac tracing (EKG, telemetry) interpretation: Sinus rhythm  Radiology interpretation: Radiology reports reviewed  Labs reviewed: Yes  Any tests that were considered but not ordered: No     Decision rules/scores evaluated (example [id number], Wells, etc): None     Discussed with: Patient and nurse staff     Care Planning  Shared decision making: With patient  Code status and discussions: Full code    Disposition  Social Determinants of Health that impact treatment or disposition: None  I expect the patient to be discharged to home in 2  days.     Electronically signed by Leobardo Avery MD, 03/14/24, 11:56 CDT.         Addendum.  3/14/2024, 2:50 PM    Patient requiring 12 L via nasal cannula on exertion.  Patient cannot be discharged home.  I asked nurse to reconsult pulmonary specialist for additional recommendations.    Patient will not be discharged  today.    Electronically signed by Leobardo Avery MD at 24 1451          Consult Notes (most recent note)        Esequiel Zamudio MD at 24 0839        Consult Orders    1. Inpatient Pulmonology Consult [[id number]] ordered by Leobardo Avery MD at 24 0714                       [facility] PULMONARY & CRITICAL CARE CONSULT - [facility]    24, 08:39 CDT  Patient Care Team:  Marcos Figueroa MD as PCP - General (Family Medicine)  Name: Kolby Michel  : 1955  MRN: [id number]  Contact Serial Number [id number]    Chief complaint: shortness of breath  HPI:  We have been consulted by Leobardo Avery MD to see this 69 y.o. male patient.  Mr. Michel was admitted with complaints of worsening shortness of breath and hypoxia. He tells me he had covid-19 around  that he felt was a light case and recovered without much issue. He reports 4 days ago he became very dyspneic with any activity. He associates nausea, vomiting and diarrhea with shortness of breath as well. He tells me this is the first time he has eaten in 4 days. He had covid-19 last year as well and required supplemental oxygen during that time. He denies any other lung history. He does not smoke. He does not vape. He is seen resting in bed. Oxygen saturation stable on 8l high flow cannula. He used BiPAP 18/8 overnight. He denies any history of christiano. ABG reviewed and stable Chest film reviewed. He is receiving azithromycin and ceftriaxone. No documented fevers. Recommendations per physician to follow.   Past Medical History:   has a past medical history of Diabetes mellitus, Hyperlipidemia, and Hypertension.   has no past surgical history on file.  Allergies   Allergen Reactions    Aspirin Angioedema    Penicillins Myalgia     Medications:  azithromycin, 500 mg, Oral, Q24H  cefTRIAXone, 1,000 mg, Intravenous, Q24H  famotidine, 40 mg, Oral, Daily  heparin (porcine), 5,000 Units, Subcutaneous,  Q12H  insulin regular, 2-7 Units, Subcutaneous, Q6H  sodium chloride, 10 mL, Intravenous, Q12H         Family History:  History reviewed. No pertinent family history.  Father  of lung cancer, was a heavy smoker per patient report   Social History:   reports that he has never smoked. He has never used smokeless tobacco. He reports that he does not drink alcohol and does not use drugs.  Review of Systems:  Positive for shortness of breath, cough, poor appetite, nausea, fever   Negative for weakness, pain   Physical Exam:  Temp:  [99 °F (37.2 °C)-100.5 °F (38.1 °C)] 99 °F (37.2 °C)  Heart Rate:  [] 99  Resp:  [22-24] 22  BP: ()/(56-82) 143/72  Intake/Output Summary (Last 24 hours) at 3/12/2024 0839  Last data filed at 3/12/2024 0529  Gross per 24 hour   Intake 100 ml   Output 300 ml   Net -200 ml         24  1800 24  0600   Weight: 134 kg (295 lb 3.1 oz) 134 kg (294 lb 8.6 oz)     SpO2 Percentage    24 0600 24 0630 24 0700   SpO2: 92% 92% 94%     Body mass index is 38.86 kg/m².   Physical Exam  Constitutional:       General: He is not in acute distress.     Interventions: Nasal cannula in place.      Comments: 8l hf    HENT:      Head: Normocephalic.      Nose: Nose normal.      Mouth/Throat:      Mouth: Mucous membranes are moist.   Eyes:      General: No scleral icterus.  Cardiovascular:      Rate and Rhythm: Tachycardia present. Rhythm irregular.   Pulmonary:      Effort: No respiratory distress.      Breath sounds: Rales present.   Abdominal:      General: There is no distension.   Musculoskeletal:      Right lower leg: No edema.      Left lower leg: No edema.   Neurological:      Mental Status: He is alert and oriented to person, place, and time.   Psychiatric:         Mood and Affect: Mood normal.         Behavior: Behavior is cooperative.       Electronically signed by JUSTYNA Garcia, 3/12/2024, 08:39 CDT       Physician Substantive Portion: Medical Decision  Making  Result Review  Results from last 7 days   Lab Units 03/12/24  0242 03/11/24  1813   WBC 10*3/mm3 17.64* 18.60*   HEMOGLOBIN g/dL 13.3 12.8*   PLATELETS 10*3/mm3 157 157     Results from last 7 days   Lab Units 03/12/24  0242 03/11/24  1909 03/11/24  1812   SODIUM mmol/L 135*  --  133*   POTASSIUM mmol/L 3.9  --  4.1   CO2 mmol/L 28.0  --  27.0   BUN mg/dL 37*  --  29*   CREATININE mg/dL 1.89*  --  1.93*   MAGNESIUM mg/dL 2.0  --  1.8   GLUCOSE mg/dL 154*  --  216*   LACTATE mmol/L  --  0.9  --      Results from last 7 days   Lab Units 03/11/24  1907   PH, ARTERIAL pH units 7.426   PCO2, ARTERIAL mm Hg 44.6   PO2 ART mm Hg 70.0*     Lab Results   Component Value Date    PROBNP 348.0 03/12/2024     Microbiology Results (last 10 days)       Procedure Component Value - Date/Time    S. Pneumo Ag Urine or CSF - Urine, Urine, Clean Catch [[id number]]  (Normal) Collected: 03/12/24 1134    Lab Status: Final result Specimen: Urine, Clean Catch Updated: 03/12/24 1209     Strep Pneumo Ag Negative    Respiratory Panel PCR w/COVID-19(SARS-CoV-2) JUDITH/SELENE/LYLE/PAD/COR/NEERU In-House, NP Swab in UTM/VTM, 2 HR TAT - Swab, Nasopharynx [[id number]]  (Normal) Collected: 03/12/24 0831    Lab Status: Final result Specimen: Swab from Nasopharynx Updated: 03/12/24 1007     ADENOVIRUS, PCR Not Detected     Coronavirus 229E Not Detected     Coronavirus HKU1 Not Detected     Coronavirus NL63 Not Detected     Coronavirus OC43 Not Detected     COVID19 Not Detected     Human Metapneumovirus Not Detected     Human Rhinovirus/Enterovirus Not Detected     Influenza A PCR Not Detected     Influenza B PCR Not Detected     Parainfluenza Virus 1 Not Detected     Parainfluenza Virus 2 Not Detected     Parainfluenza Virus 3 Not Detected     Parainfluenza Virus 4 Not Detected     RSV, PCR Not Detected     Bordetella pertussis pcr Not Detected     Bordetella parapertussis PCR Not Detected     Chlamydophila pneumoniae PCR Not Detected     Mycoplasma  pneumo by PCR Not Detected    Narrative:      In the setting of a positive respiratory panel with a viral infection PLUS a negative procalcitonin without other underlying concern for bacterial infection, consider observing off antibiotics or discontinuation of antibiotics and continue supportive care. If the respiratory panel is positive for atypical bacterial infection (Bordetella pertussis, Chlamydophila pneumoniae, or Mycoplasma pneumoniae), consider antibiotic de-escalation to target atypical bacterial infection.    Blood Culture - Blood, Arm, Left [[id number]]  (Normal) Collected: 03/11/24 1909    Lab Status: Preliminary result Specimen: Blood from Arm, Left Updated: 03/12/24 1930     Blood Culture No growth at 24 hours    Blood Culture - Blood, Arm, Right [[id number]]  (Normal) Collected: 03/11/24 1908    Lab Status: Preliminary result Specimen: Blood from Arm, Right Updated: 03/12/24 1930     Blood Culture No growth at 24 hours             Recent radiology:   Imaging Results (Last 72 Hours)       Procedure Component Value Units Date/Time    US Renal Bilateral [[id number]] Collected: 03/12/24 1547     Updated: 03/12/24 1551    Narrative:      EXAM/TECHNIQUE: US RENAL BILATERAL-     INDICATION: MALLIKA; R13.10-Dysphagia, unspecified; Z74.09-Other reduced  mobility     COMPARISON: None     FINDINGS:     Abdominal aorta and IVC appear unremarkable.     Right kidney is 13.9 cm in length and demonstrates normal cortical  thickness and echogenicity. No shadowing calculi or hydronephrosis.     Left kidney is 12.9 cm in length and demonstrates normal cortical  thickness and echogenicity. No shadowing calculi or hydronephrosis.     No focal urinary bladder abnormality.       Impression:      Negative for hydronephrosis or renal atrophy.     This report was signed and finalized on 3/12/2024 3:48 PM by Dr. Josué Oseguera MD.       XR Chest 1 View [[id number]] Collected: 03/12/24 0650     Updated: 03/12/24 0655    Narrative:       EXAMINATION: XR CHEST 1 VW-     3/12/2024 2:30 AM     HISTORY: Resp failure     A frontal lordotic view of the chest is obtained. There is no previous  study for comparison.     The lungs are poorly expanded.     There are atelectatic changes in the lungs bilaterally.     Left diaphragm is obscured and may suggest an adjacent infiltrate or  consolidation.     A probable small pleural effusion at left base.     There is no pulmonary congestion or pneumothorax.     The heart size is not optimally visualized or evaluated in this study.     No acute bony abnormality. There is a mild S-shaped scoliosis.       Impression:      1. Poor lung expansion. Atelectatic changes bilaterally. A probable left  lower lobar consolidation and a small left basal pleural effusion.              This report was signed and finalized on 3/12/2024 6:52 AM by Dr. Marva Schaffer MD.               Personal review of imaging : CXR shows decreased lung volumes, atelectasis, mild  Other test results:  none available  Independent review of ekg: LAD, and q waves, present also in 2023ep      Pulmonary Assessment:  Shortness of breath  Acute resp failure with hypoxia, very severe, improving, but still threat to life and bodily function   Hx Covid, possible long covid  Atelectasis, acute, may account for part or most of the hypoxia  Possible pneumonia, presumptive dx, could also account for the hypoxia.  Other possibility post-Covid related organizing pneumonitis  Elevated creat  Leukocytosis  Chronically abn EKG  Hyponatremia improving    Recommend/plan:   Empiric steroids iv, empiric abx.  high risk of morbidity from additional diagnostic testing or treatment with risk of severe hyperglycemia, electrolyte disturbance  Mobilization as tolerated  Legionella antigen and pneumococcal antigen  Respiratory culture  Continue monitor o2 sat, supplemental oxygen as needed.  O2 need is lessening  Renal workup per others  Get hrct chest if process does  not resolve acutely  He was needing bipap on arrival, can discontinue now      This visit was performed by both a physician and an [facility] Practice RN.  I personally evaluated and examined the patient.  I performed all aspects of the medical decision making as documented.  Electronically signed by Esequiel Zamudio MD, 3/12/2024, 20:41 CDT           Electronically signed by Esequiel Zamudio MD at 24 2102          Physical Therapy Notes (most recent note)        Nicolasa Prabhakar, [facility] at 24 1216  Version 1 of 1         Acute Care - Physical Therapy Treatment Note  [facility]     Patient Name: Kolby Michel  : 1955  MRN: [id number]  Today's Date: 3/14/2024      Visit Dx:     ICD-10-CM ICD-9-CM   1. Dysphagia, unspecified type [R13.10]  R13.10 787.20   2. Impaired mobility [Z74.09]  Z74.09 799.89   3. Acute respiratory failure with hypoxemia  J96.01 518.81     Patient Active Problem List   Diagnosis    Acute respiratory failure with hypoxemia    Atypical pneumonia    DM (diabetes mellitus), type 2    Essential hypertension, benign    Obesity (BMI 30-39.9)    Pneumonia, unspecified organism     Past Medical History:   Diagnosis Date    Diabetes mellitus     Hyperlipidemia     Hypertension      History reviewed. No pertinent surgical history.  PT Assessment (Last 12 Hours)       PT Evaluation and Treatment       Row Name 24 1015          Physical Therapy Time and Intention    Subjective Information complains of;fatigue  -     Document Type therapy note (daily note)  -     Mode of Treatment physical therapy  -       Row Name 24 1015          General Information    Existing Precautions/Restrictions fall;oxygen therapy device and L/min  -       Row Name 24 1015          Pain    Posttreatment Pain Rating 0/10 - no pain  -       Row Name 24 1015          Sit-Stand Transfer    Sit-Stand [place] (Transfers) standby assist  -       Row Name 24 1015           Stand-Sit Transfer    Stand-Sit [place] (Transfers) standby assist  -BESSY       Row Name 03/14/24 1015          Gait/Stairs (Locomotion)    [place] Level (Gait) standby assist  -BESSY     Distance in Feet (Gait) 300  -BESSY     Pattern (Gait) swing-through  -BESSY     Deviations/Abnormal Patterns (Gait) base of support, narrow  -BESSY       Row Name 03/14/24 1015          Motor Skills    Comments, Therapeutic Exercise AP/LAQ x 15  -BESSY       Row Name 03/14/24 1015          Positioning and Restraints    Pre-Treatment Position sitting in chair/recliner  -BESSY     Post Treatment Position chair  -BESSY     In Chair reclined;call light within reach;encouraged to call for assist;with family/caregiver  -BESSY               User Key  (r) = Recorded By, (t) = Taken By, (c) = Cosigned By      Initials Name Provider Type    Nicolasa Navarrete, ROMEL Physical Therapist Assistant                    Physical Therapy Education       Title: PT OT SLP Therapies (In Progress)       Topic: Physical Therapy (In Progress)       Point: Mobility training (In Progress)       Learning Progress Summary             Patient Acceptance, E,D, NR by BESSY at 3/14/2024 1206    Comment: increasing Base of support    Acceptance, E, VU by ADELA at 3/12/2024 1152    Comment: stair negotiation safety with O2 demands, gait and long distance walks with proper breathing tecnhiques   Family Acceptance, E, VU by ADELA at 3/12/2024 1152    Comment: stair negotiation safety with O2 demands, gait and long distance walks with proper breathing tecnhiques                         Point: Precautions (In Progress)       Learning Progress Summary             Patient Acceptance, E,D, NR by BESSY at 3/13/2024 1239    Comment: purse lip breathing    Acceptance, E, VU by ADELA at 3/12/2024 1152    Comment: stair negotiation safety with O2 demands, gait and long distance walks with proper breathing tecnhiques   Family Acceptance, E, VU by ADELA at 3/12/2024 1152    Comment: stair negotiation safety  with O2 demands, gait and long distance walks with proper breathing tecnhiques                                         User Key       Initials Effective Dates Name Provider Type Discipline     02/03/23 -  Nicolasa Prabhakar PTA Physical Therapist Assistant PT    AJ 02/22/24 -  Chay Mccollum, OTF Student PT Student PT                  PT Recommendation and Plan         Outcome Measures       Row Name 03/14/24 1200 03/13/24 1200          How much help from another person do you currently need...    Turning from your back to your side while in flat bed without using bedrails? 4  -BESSY 4  -BESSY     Moving from lying on back to sitting on the side of a flat bed without bedrails? 4  -BESSY 4  -BESSY     Moving to and from a bed to a chair (including a wheelchair)? 4  -BESSY 4  -BESSY     Standing up from a chair using your arms (e.g., wheelchair, bedside chair)? 4  -BESSY 4  -BESSY     Climbing 3-5 steps with a railing? 3  -BESSY 4  -BESSY     To walk in hospital room? 4  -BESSY 4  -BESSY     AM-PAC 6 Clicks Score (PT) 23  -BESSY 24  -BESSY     Highest Level of Mobility Goal 7 --> Walk 25 feet or more  -BESSY 8 --> Walked 250 feet or more  -BESSY               User Key  (r) = Recorded By, (t) = Taken By, (c) = Cosigned By      Initials Name Provider Type    BESSY Nicolasa Prabhakar PTA Physical Therapist Assistant                     Time Calculation:    PT Charges       Row Name 03/14/24 1214             Time Calculation    Start Time 1015  -BESSY      Stop Time 1030  -BESSY      Time Calculation (min) 15 min  -BESSY         Timed Charges    94474 - Gait Training Minutes  15  -BESSY         Total Minutes    Timed Charges Total Minutes 15  -BESSY       Total Minutes 15  -BESSY                User Key  (r) = Recorded By, (t) = Taken By, (c) = Cosigned By      Initials Name Provider Type    Nicolasa Navarrete PTA Physical Therapist Assistant                  Therapy Charges for Today       Code Description Service Date Service Provider Modifiers Qty    83074510791  PT THER PROC EA 15  MIN 3/13/2024 Nicolasa Prabhakar, PTA GP 1    21279140135 HC GAIT TRAINING EA 15 MIN 3/14/2024 Nicolasa Prabhakar PTA GP 1            PT G-Codes  Outcome Measure Options: AM-PAC 6 Clicks Basic Mobility (PT)  AM-PAC 6 Clicks Score (PT): 23    Nicolasa Prabhakar PTA  3/14/2024      Electronically signed by Nicolasa Prabhakar PTA at 03/14/24 1216          Respiratory Therapy Notes (most recent note)        Tucker Gutierrez, RRT at 03/12/24 0632            Problem: Noninvasive Ventilation Acute  Goal: Effective Unassisted Ventilation and Oxygenation  Outcome: Ongoing, Progressing   Goal Outcome Evaluation:      Continue use and weaning of BIPAP as needed and tolerated                                          Electronically signed by Tucker Gutierrez, RRT at 03/12/24 0692

## 2024-03-14 NOTE — PLAN OF CARE
Goal Outcome Evaluation:  Plan of Care Reviewed With: patient        Progress: no change  Outcome Evaluation: VSS, NSR, No C/O pain, pt has had multiple episodes of diarhea over the last few days, discussed with Dr. Medina and pt was given 1 dose of immodium. Pt had only 1 more episode after that and has been resting comfortably. Urine PNA panel sent down, pt is still unable to cough any sputum up for a sample. ABX remain in place. Unable to wean O2 as pt's sat's remain in the low 90s on 3liters, IS at bedside and pt was educated on it's use safety maintained, MD will be notified of any changes.

## 2024-03-14 NOTE — PROGRESS NOTES
[facility] Medicine Services  INPATIENT PROGRESS NOTE    Patient Name: Kolby Michel  Date of Admission: 3/11/2024  Today's Date: 03/14/24  Length of Stay: 3  Primary Care Physician: Marcos Figueroa MD    Subjective   Chief Complaint: Shortness of breath and vomiting  HPI   69-year-old male with history of diabetes mellitus type 2, hypertension, recent COVID-19 in February 2024, came to the hospital reporting symptoms for approximately 5 days, given by chills, fevers 101.2, shortness of breath, dry cough and episodes of vomiting. He went to another hospital ER, was found to be hypoxemic, started on BiPAP support, received Levaquin IV for management of pneumonia. He was transferred for higher level of care.     No fevers reported overnight.    No vomiting.  No chest pain.  He still on oxygen via nasal cannula.  Ambulating. Oxygen drops significantly on exertion.      Review of Systems   All pertinent negatives and positives are as above. All other systems have been reviewed and are negative unless otherwise stated.     Objective    Temp:  [97.8 °F (36.6 °C)-99 °F (37.2 °C)] 97.9 °F (36.6 °C)  Heart Rate:  [] 100  Resp:  [18-20] 18  BP: (131-146)/(61-72) 146/61  Physical Exam  Constitutional:       Appearance: Normal appearance.  On nasal cannula, sitting in recliner.  No significant distress.  Tremors in both upper extremities.  HENT:      Head: Normocephalic and atraumatic.      Nose: Nose normal.      Mouth/Throat:      Mouth: Mucous membranes are moist.      Pharynx: Oropharynx is clear.   Eyes:      Extraocular Movements: Extraocular movements intact.      Conjunctiva/sclera: Conjunctivae normal.      Pupils: Pupils are equal, round, and reactive to light.   Cardiovascular:      Rate and Rhythm: Normal rate and regular rhythm.      Pulses: Normal pulses.   Pulmonary:      Effort: No respiratory distress.      Breath sounds: Decreased breath sounds mostly right base.  "Rales in both bases.  No wheezing.    Abdominal:      General: Abdomen is obese..  Bowel sounds are normal.      Palpations: Abdomen is soft.      Tenderness: There is no guarding or rebound.   Musculoskeletal:         General: Normal range of motion.      Cervical back: Normal range of motion and neck supple.   Extremities:  No lower extremity edema.  Skin:     Capillary Refill: Capillary refill takes less than 2 seconds.      Coloration: Skin is not jaundiced.      Findings: No rash.   Neurological:      General: No focal deficit present.      Mental Status: Patient is alert, oriented to place time and person.     Sensory: No sensory deficit.      Motor: No weakness.      Coordination: Coordination normal.   Psychiatric:         Mood and Affect: Mood normal.         Behavior: Behavior normal.          Results Review:  I have reviewed the labs, radiology results, and diagnostic studies.    Laboratory Data:   Results from last 7 days   Lab Units 03/14/24  0426 03/13/24  0144 03/12/24  0242   WBC 10*3/mm3 13.15* 15.37* 17.64*   HEMOGLOBIN g/dL 12.0* 12.4* 13.3   HEMATOCRIT % 37.3* 38.8 41.3   PLATELETS 10*3/mm3 176 158 157        Results from last 7 days   Lab Units 03/14/24  0426 03/13/24  0144 03/12/24  0242   SODIUM mmol/L 138 137 135*   POTASSIUM mmol/L 4.0 3.7 3.9   CHLORIDE mmol/L 102 100 95*   CO2 mmol/L 29.0 28.0 28.0   BUN mg/dL 29* 34* 37*   CREATININE mg/dL 0.96 1.08 1.89*   CALCIUM mg/dL 8.7 8.6 8.7   BILIRUBIN mg/dL  --  0.5 0.8   ALK PHOS U/L  --  103 92   ALT (SGPT) U/L  --  22 22   AST (SGOT) U/L  --  20 21   GLUCOSE mg/dL 169* 144* 154*       Culture Data:   No results found for: \"BLOODCX\", \"URINECX\", \"WOUNDCX\", \"MRSACX\", \"RESPCX\", \"STOOLCX\"    Radiology Data:   Imaging Results (Last 24 Hours)       ** No results found for the last 24 hours. **            I have reviewed the patient's current medications.     Assessment/Plan   Assessment  Active Hospital Problems    Diagnosis     **Acute respiratory " failure with hypoxemia     Pneumonia, unspecified organism     Atypical pneumonia     DM (diabetes mellitus), type 2     Essential hypertension, benign     Obesity (BMI 30-39.9)        Acute respiratory failure with hypoxemia  Community-acquired pneumonia, atypical  Acute kidney injury/renal insufficiency resolving  Hypertension  Diabetes mellitus type 2  Morbid obesity, BMI 39        ABGs March 11, 2024 at 10 AM, showed a pH of 7.53.  pCO2 35, pO2 45.  Saturation of oxygen at 86.2.  HCO3 29.6; this was performed on room air.      Blood gases performed on arrival to [facility].  pH 7.42.  pCO2 44.6.  pO2 70.  Bicarbonate 29.3.  O2 sat 94.6.  This was performed on 10 L high flow nasal cannula.     No more fevers. Last fever episode 6 PM 3/11/24.      Glucose controlled.  Creatinine down from 1.89 to 1.08.  No electrolyte normalities.  White blood cell count trending down but still present.  15,000      Treatment Plan    Respiratory panel negative    Pulmonary commendations noted.    Antibiotics ceftriaxone day 4/azithromycin completed 3 days    Unable to perform imaging studies as patient does not tolerate decubitus position; have tried and does not want to do it.    Arrange Oxygen for home  Needs Pulmonary follow up in 2 weeks; patient should have bronchoscopy and additional imaging if possible to evaluate cause of persistent hypoxia.    Continue antihypertensive medications.  Continue Actos.   Continue statin.  Heparin subcutaneous for DVT prophylaxis      Evaluate oxygen need with walking test and discharge home.    Medical Decision Making  Number and Complexity of problems:, Moderate complexity  Differential Diagnosis: See above    Conditions and Status        Condition is improving.     MDM Data  External documents reviewed: None  Cardiac tracing (EKG, telemetry) interpretation: Sinus rhythm  Radiology interpretation: Radiology reports reviewed  Labs reviewed: Yes  Any tests that were considered but not  ordered: No     Decision rules/scores evaluated (example [id number], [place], etc): None     Discussed with: Patient and nurse staff     Care Planning  Shared decision making: With patient  Code status and discussions: Full code    Disposition  Social Determinants of Health that impact treatment or disposition: None  I expect the patient to be discharged to home in 2  days.     Electronically signed by Leobardo Avery MD, 03/14/24, 11:56 CDT.         Addendum.  3/14/2024, 2:50 PM    Patient requiring 12 L via nasal cannula on exertion.  Patient cannot be discharged home.  I asked nurse to reconsult pulmonary specialist for additional recommendations.    Patient will not be discharged today.   Cheiloplasty (Less Than 50%) Text: A decision was made to reconstruct the defect with a  cheiloplasty.  The defect was undermined extensively.  Additional obicularis oris muscle was excised with a 15 blade scalpel.  The defect was converted into a full thickness wedge, of less than 50% of the vertical height of the lip, to facilite a better cosmetic result.  Small vessels were then tied off with 5-0 monocyrl. The obicularis oris, superficial fascia, adipose and dermis were then reapproximated.  After the deeper layers were approximated the epidermis was reapproximated with particular care given to realign the vermilion border.

## 2024-03-14 NOTE — PLAN OF CARE
Goal Outcome Evaluation:           Progress: no change  Outcome Evaluation: VSS on 4L o2. Patient resting comfortably in chair this shift. IV solumedrol added this afternoon. No bowel movements reported this shift. S/ST  on tele.

## 2024-03-14 NOTE — PROGRESS NOTES
[facility] PULMONARY AND CRITICAL CARE PROGRESS NOTE - [facility]    Patient: Kolby Michel  1955   MR# [id number]   Acct# [id number]  03/14/24   07:36 CDT  Referring Provider: Leobardo Avery MD    Chief Complaint: Shortness of breath    Interval history: He is afebrile. He is 3L NC with sat of 93%. WBC trending down at 13.15. No new imaging for review. Azithromycin completed. Remains on Ceftriaxone. He is on no steroids. He is sitting up in the bedside chair and notes he is feeling  much better. Recommend Home O2 eval prior to discharge. He has used [facility] in [place] in the past for oxygen. He will need an office follow up with pulmonology within a week to reassess CXR, oxygen needs and ability to return to work. He will need a pulmonary function study in the near future.     Meds:  amLODIPine, 5 mg, Oral, Daily  cefTRIAXone, 1,000 mg, Intravenous, Q24H  famotidine, 40 mg, Oral, Daily  gabapentin, 600 mg, Oral, Q12H  heparin (porcine), 5,000 Units, Subcutaneous, Q12H  insulin lispro, 2-7 Units, Subcutaneous, 4x Daily AC & at Bedtime  pioglitazone, 30 mg, Oral, Daily  rosuvastatin, 10 mg, Oral, Daily  sertraline, 50 mg, Oral, Daily  sodium chloride, 10 mL, Intravenous, Q12H           Physical Exam:  SpO2 Percentage    03/13/24 1955 03/13/24 2330 03/14/24 0327   SpO2: 92% 92% 93%     Body mass index is 39.74 kg/m².   Temp:  [97.8 °F (36.6 °C)-99 °F (37.2 °C)] 97.8 °F (36.6 °C)  Heart Rate:  [] 96  Resp:  [18-20] 18  BP: (131-146)/(62-72) 144/65  Intake/Output Summary (Last 24 hours) at 3/14/2024 0736  Last data filed at 3/14/2024 0327  Gross per 24 hour   Intake 420 ml   Output --   Net 420 ml     Physical Exam  Constitutional:       Appearance: Normal appearance.      Interventions: Nasal cannula in place.      Comments: 3L   HENT:      Head: Normocephalic and atraumatic.   Cardiovascular:      Rate and Rhythm: Normal rate. Rhythm irregular.      Heart sounds: No murmur heard.      No friction rub. No gallop.   Pulmonary:      Effort: Pulmonary effort is normal.      Breath sounds: Decreased breath sounds present.   Abdominal:      General: Bowel sounds are normal. There is no distension.      Palpations: Abdomen is soft.   Musculoskeletal:      Right lower leg: No edema.      Left lower leg: No edema.   Skin:     General: Skin is warm and dry.   Neurological:      General: No focal deficit present.      Mental Status: He is alert and oriented to person, place, and time.   Psychiatric:         Mood and Affect: Mood normal.         Behavior: Behavior normal.       Electronically signed by JUSTYNA Welch, 3/14/2024, 07:36 CDT      Physician Substantive Portion: Medical Decision Making to follow:      Result Review    Laboratory Data:  Results from last 7 days   Lab Units 03/14/24 0426 03/13/24 0144 03/12/24 0242   WBC 10*3/mm3 13.15* 15.37* 17.64*   HEMOGLOBIN g/dL 12.0* 12.4* 13.3   PLATELETS 10*3/mm3 176 158 157     Results from last 7 days   Lab Units 03/14/24 0426 03/13/24 0144 03/12/24 0242 03/11/24  1909   SODIUM mmol/L 138 137 135*  --    POTASSIUM mmol/L 4.0 3.7 3.9  --    CHLORIDE mmol/L 102 100 95*  --    CO2 mmol/L 29.0 28.0 28.0  --    BUN mg/dL 29* 34* 37*  --    CREATININE mg/dL 0.96 1.08 1.89*  --    CALCIUM mg/dL 8.7 8.6 8.7  --    ALK PHOS U/L  --  103 92  --    ALT (SGPT) U/L  --  22 22  --    AST (SGOT) U/L  --  20 21  --    LACTATE mmol/L  --   --   --  0.9         Lab 03/14/24 0426 03/13/24 0144 03/12/24  0242   GLUCOSE 169* 144* 154*     Results from last 7 days   Lab Units 03/11/24  1907   PH, ARTERIAL pH units 7.426   PCO2, ARTERIAL mm Hg 44.6   PO2 ART mm Hg 70.0*         Lab 03/12/24  0242   PROBNP 348.0     Microbiology Results (last 10 days)       Procedure Component Value - Date/Time    S. Pneumo Ag Urine or CSF - Urine, Urine, Clean Catch [[id number]]  (Normal) Collected: 03/12/24 1134    Lab Status: Final result Specimen: Urine, Clean Catch  Updated: 03/12/24 1209     Strep Pneumo Ag Negative    Legionella Antigen, Urine - Urine, Urine, Clean Catch [[id number]]  (Normal) Collected: 03/12/24 1134    Lab Status: Final result Specimen: Urine, Clean Catch Updated: 03/12/24 2120     LEGIONELLA ANTIGEN, URINE Negative    Respiratory Panel PCR w/COVID-19(SARS-CoV-2) JUDITH/SELENE/LYLE/PAD/COR/NEERU In-House, NP Swab in UTM/VTM, 2 HR TAT - Swab, Nasopharynx [[id number]]  (Normal) Collected: 03/12/24 0831    Lab Status: Final result Specimen: Swab from Nasopharynx Updated: 03/12/24 1007     ADENOVIRUS, PCR Not Detected     Coronavirus 229E Not Detected     Coronavirus HKU1 Not Detected     Coronavirus NL63 Not Detected     Coronavirus OC43 Not Detected     COVID19 Not Detected     Human Metapneumovirus Not Detected     Human Rhinovirus/Enterovirus Not Detected     Influenza A PCR Not Detected     Influenza B PCR Not Detected     Parainfluenza Virus 1 Not Detected     Parainfluenza Virus 2 Not Detected     Parainfluenza Virus 3 Not Detected     Parainfluenza Virus 4 Not Detected     RSV, PCR Not Detected     Bordetella pertussis pcr Not Detected     Bordetella parapertussis PCR Not Detected     Chlamydophila pneumoniae PCR Not Detected     Mycoplasma pneumo by PCR Not Detected    Narrative:      In the setting of a positive respiratory panel with a viral infection PLUS a negative procalcitonin without other underlying concern for bacterial infection, consider observing off antibiotics or discontinuation of antibiotics and continue supportive care. If the respiratory panel is positive for atypical bacterial infection (Bordetella pertussis, Chlamydophila pneumoniae, or Mycoplasma pneumoniae), consider antibiotic de-escalation to target atypical bacterial infection.    Blood Culture - Blood, Arm, Left [[id number]]  (Normal) Collected: 03/11/24 1909    Lab Status: Preliminary result Specimen: Blood from Arm, Left Updated: 03/13/24 1930     Blood Culture No growth at 2 days     Mycoplasma Pneumoniae Antibody, IgM - Blood, Arm, Left [[id number]] Collected: 03/11/24 1909    Lab Status: Final result Specimen: Blood from Arm, Left Updated: 03/13/24 1513     M pneumoniae IgM Abs <770 U/mL      Comment:                              Negative            <770  Clinically significant amount of M. pneumoniae antibody  not detected.                               Low Positive   770 - 950  M. pneumoniae specific IgM presumptively detected.  It  is recommended that another sample be collected 1-2  weeks later to assure reactivity.                               Positive            >950  Highly significant amount of M. pneumoniae specific  IgM antibody detected.       Narrative:      Performed at:  [address] [place] [address]  [id number]  : Enrrique Ernandez PhD, Phone:  [phone]    Blood Culture - Blood, Arm, Right [[id number]]  (Normal) Collected: 03/11/24 1908    Lab Status: Preliminary result Specimen: Blood from Arm, Right Updated: 03/13/24 1930     Blood Culture No growth at 2 days           Recent films:  US Renal Bilateral    Result Date: 3/12/2024  EXAM/TECHNIQUE: US RENAL BILATERAL-  INDICATION: MALLIKA; R13.10-Dysphagia, unspecified; Z74.09-Other reduced mobility  COMPARISON: None  FINDINGS:  Abdominal aorta and IVC appear unremarkable.  Right kidney is 13.9 cm in length and demonstrates normal cortical thickness and echogenicity. No shadowing calculi or hydronephrosis.  Left kidney is 12.9 cm in length and demonstrates normal cortical thickness and echogenicity. No shadowing calculi or hydronephrosis.  No focal urinary bladder abnormality.      Impression: Negative for hydronephrosis or renal atrophy.  This report was signed and finalized on 3/12/2024 3:48 PM by Dr. Josué Oseguera MD.            Pulmonary Assessment:  Recurrent acute resp failure  Scoliosis  History of Covid  atelectasis    Recommend/plan:   Ok to home from my standpoint today  Check rest and  exercise oxygen saturation to determine home o2 need if any  Follow up with pft    This visit was performed by both a physician and an Advanced Practice RN.  I performed all aspects of the medical decision making as documented.  Electronically signed by Esequiel Zamudio MD, 3/14/2024, 10:05 CDT        ADDENDUM:  Pt required tremendous amount of oxygen with exertion, so he will not be ready to go home.  Continue tx and reassess in am.  Electronically signed by Esequiel Zamudio MD, 3/14/2024, 16:51 CDT

## 2024-03-14 NOTE — THERAPY TREATMENT NOTE
Acute Care - Physical Therapy Treatment Note  [facility]     Patient Name: Kolby Michel  : 1955  MRN: [id number]  Today's Date: 3/14/2024      Visit Dx:     ICD-10-CM ICD-9-CM   1. Dysphagia, unspecified type [R13.10]  R13.10 787.20   2. Impaired mobility [Z74.09]  Z74.09 799.89   3. Acute respiratory failure with hypoxemia  J96.01 518.81     Patient Active Problem List   Diagnosis    Acute respiratory failure with hypoxemia    Atypical pneumonia    DM (diabetes mellitus), type 2    Essential hypertension, benign    Obesity (BMI 30-39.9)    Pneumonia, unspecified organism     Past Medical History:   Diagnosis Date    Diabetes mellitus     Hyperlipidemia     Hypertension      History reviewed. No pertinent surgical history.  PT Assessment (Last 12 Hours)       PT Evaluation and Treatment       Row Name 24 1015          Physical Therapy Time and Intention    Subjective Information complains of;fatigue  -BESSY     Document Type therapy note (daily note)  -     Mode of Treatment physical therapy  -       Row Name 24 1015          General Information    Existing Precautions/Restrictions fall;oxygen therapy device and L/min  -       Row Name 24 1015          Pain    Posttreatment Pain Rating 0/10 - no pain  -BESSY       Row Name 24 1015          Sit-Stand Transfer    Sit-Stand [place] (Transfers) standby assist  -       Row Name 24 1015          Stand-Sit Transfer    Stand-Sit [place] (Transfers) standby assist  -       Row Name 24 1015          Gait/Stairs (Locomotion)    [place] Level (Gait) standby assist  -BESSY     Distance in Feet (Gait) 300  -BESSY     Pattern (Gait) swing-through  -BESSY     Deviations/Abnormal Patterns (Gait) base of support, narrow  -BESSY       Row Name 24 1015          Motor Skills    Comments, Therapeutic Exercise AP/LAQ x 15  -BESSY       Row Name 24 1015          Positioning and Restraints    Pre-Treatment Position sitting in  chair/recliner  -BESSY     Post Treatment Position chair  -BESSY     In Chair reclined;call light within reach;encouraged to call for assist;with family/caregiver  -BESSY               User Key  (r) = Recorded By, (t) = Taken By, (c) = Cosigned By      Initials Name Provider Type    BESSY Nicolasa Prabhakar PTA Physical Therapist Assistant                    Physical Therapy Education       Title: PT OT SLP Therapies (In Progress)       Topic: Physical Therapy (In Progress)       Point: Mobility training (In Progress)       Learning Progress Summary             Patient Acceptance, E,D, NR by BESSY at 3/14/2024 1206    Comment: increasing Base of support    Acceptance, E, VU by ADELA at 3/12/2024 1152    Comment: stair negotiation safety with O2 demands, gait and long distance walks with proper breathing tecnhiques   Family Acceptance, E, VU by ADELA at 3/12/2024 1152    Comment: stair negotiation safety with O2 demands, gait and long distance walks with proper breathing tecnhiques                         Point: Precautions (In Progress)       Learning Progress Summary             Patient Acceptance, E,D, NR by BESSY at 3/13/2024 1239    Comment: purse lip breathing    Acceptance, E, VU by ADELA at 3/12/2024 1152    Comment: stair negotiation safety with O2 demands, gait and long distance walks with proper breathing tecnhiques   Family Acceptance, E, VU by ADELA at 3/12/2024 1152    Comment: stair negotiation safety with O2 demands, gait and long distance walks with proper breathing tecnhiques                                         User Key       Initials Effective Dates Name Provider Type Discipline     02/03/23 -  Nicolasa Prabhakar PTA Physical Therapist Assistant PT    ADELA 02/22/24 -  Chay Mccollum PT Student PT Student PT                  PT Recommendation and Plan         Outcome Measures       Row Name 03/14/24 1200 03/13/24 1200          How much help from another person do you currently need...    Turning from your back to your side  while in flat bed without using bedrails? 4  -BESSY 4  -BESSY     Moving from lying on back to sitting on the side of a flat bed without bedrails? 4  -BESSY 4  -BESSY     Moving to and from a bed to a chair (including a wheelchair)? 4  -BESSY 4  -BESSY     Standing up from a chair using your arms (e.g., wheelchair, bedside chair)? 4  -BESSY 4  -BESSY     Climbing 3-5 steps with a railing? 3  -BESSY 4  -BESSY     To walk in hospital room? 4  -BESSY 4  -BESSY     AM-PAC 6 Clicks Score (PT) 23  -BESSY 24  -BESSY     Highest Level of Mobility Goal 7 --> Walk 25 feet or more  -BESSY 8 --> Walked 250 feet or more  -BESSY               User Key  (r) = Recorded By, (t) = Taken By, (c) = Cosigned By      Initials Name Provider Type    Nicolasa Navarrete PTA Physical Therapist Assistant                     Time Calculation:    PT Charges       Row Name 03/14/24 1214             Time Calculation    Start Time 1015  -BESSY      Stop Time 1030  -BESSY      Time Calculation (min) 15 min  -BESSY         Timed Charges    17190 - Gait Training Minutes  15  -BESSY         Total Minutes    Timed Charges Total Minutes 15  -BESSY       Total Minutes 15  -BESSY                User Key  (r) = Recorded By, (t) = Taken By, (c) = Cosigned By      Initials Name Provider Type    Nicolasa Navarrete PTA Physical Therapist Assistant                  Therapy Charges for Today       Code Description Service Date Service Provider Modifiers Qty    98222192884 HC PT THER PROC EA 15 MIN 3/13/2024 Nicolasa Prabhakar PTA GP 1    27613496572 HC GAIT TRAINING EA 15 MIN 3/14/2024 Nicolasa Prabhakar PTA GP 1            PT G-Codes  Outcome Measure Options: AM-PAC 6 Clicks Basic Mobility (PT)  AM-PAC 6 Clicks Score (PT): 23    Nicolasa Prabhakar PTA  3/14/2024

## 2024-03-14 NOTE — PLAN OF CARE
Goal Outcome Evaluation:      Patient stood and ambulated 300' with CGA. SATs on 3 lpm 95 -87 -93%.                                          (1) body pink, extremities blue

## 2024-03-15 ENCOUNTER — APPOINTMENT (OUTPATIENT)
Dept: GENERAL RADIOLOGY | Facility: HOSPITAL | Age: 69
DRG: 189 | End: 2024-03-15
Payer: MEDICARE

## 2024-03-15 ENCOUNTER — APPOINTMENT (OUTPATIENT)
Dept: CARDIOLOGY | Facility: HOSPITAL | Age: 69
DRG: 189 | End: 2024-03-15
Payer: MEDICARE

## 2024-03-15 PROBLEM — Z86.16 HISTORY OF COVID-19: Status: ACTIVE | Noted: 2024-03-15

## 2024-03-15 PROBLEM — R06.01 ORTHOPNEA: Status: ACTIVE | Noted: 2024-03-15

## 2024-03-15 LAB
BH CV ECHO MEAS - AO MAX PG: 7.5 MMHG
BH CV ECHO MEAS - AO MEAN PG: 4 MMHG
BH CV ECHO MEAS - AO ROOT DIAM: 3.1 CM
BH CV ECHO MEAS - AO V2 MAX: 137 CM/SEC
BH CV ECHO MEAS - AO V2 VTI: 23.5 CM
BH CV ECHO MEAS - AVA(I,D): 3.7 CM2
BH CV ECHO MEAS - EDV(CUBED): 157.5 ML
BH CV ECHO MEAS - EDV(MOD-SP4): 113 ML
BH CV ECHO MEAS - EF(MOD-SP4): 72.6 %
BH CV ECHO MEAS - ESV(CUBED): 24.4 ML
BH CV ECHO MEAS - ESV(MOD-SP4): 31 ML
BH CV ECHO MEAS - FS: 46.3 %
BH CV ECHO MEAS - IVS/LVPW: 0.92 CM
BH CV ECHO MEAS - IVSD: 1.1 CM
BH CV ECHO MEAS - LA DIMENSION: 3.7 CM
BH CV ECHO MEAS - LAT PEAK E' VEL: 9.1 CM/SEC
BH CV ECHO MEAS - LV DIASTOLIC VOL/BSA (35-75): 44 CM2
BH CV ECHO MEAS - LV MASS(C)D: 249.4 GRAMS
BH CV ECHO MEAS - LV MAX PG: 3 MMHG
BH CV ECHO MEAS - LV MEAN PG: 2 MMHG
BH CV ECHO MEAS - LV SYSTOLIC VOL/BSA (12-30): 12.1 CM2
BH CV ECHO MEAS - LV V1 MAX: 85.9 CM/SEC
BH CV ECHO MEAS - LV V1 VTI: 16.3 CM
BH CV ECHO MEAS - LVIDD: 5.4 CM
BH CV ECHO MEAS - LVIDS: 2.9 CM
BH CV ECHO MEAS - LVOT AREA: 5.3 CM2
BH CV ECHO MEAS - LVOT DIAM: 2.6 CM
BH CV ECHO MEAS - LVPWD: 1.2 CM
BH CV ECHO MEAS - MED PEAK E' VEL: 7.6 CM/SEC
BH CV ECHO MEAS - MV A MAX VEL: 73.3 CM/SEC
BH CV ECHO MEAS - MV DEC TIME: 0.16 SEC
BH CV ECHO MEAS - MV E MAX VEL: 63.4 CM/SEC
BH CV ECHO MEAS - MV E/A: 0.86
BH CV ECHO MEAS - PA V2 MAX: 86.9 CM/SEC
BH CV ECHO MEAS - RAP SYSTOLE: 8 MMHG
BH CV ECHO MEAS - RVSP: 18.2 MMHG
BH CV ECHO MEAS - SI(MOD-SP4): 31.9 ML/M2
BH CV ECHO MEAS - SV(LVOT): 86.5 ML
BH CV ECHO MEAS - SV(MOD-SP4): 82 ML
BH CV ECHO MEAS - TR MAX PG: 10.2 MMHG
BH CV ECHO MEAS - TR MAX VEL: 160 CM/SEC
BH CV ECHO MEASUREMENTS AVERAGE E/E' RATIO: 7.59
BH CV XLRA - TDI S': 16.2 CM/SEC
GLUCOSE BLDC GLUCOMTR-MCNC: 259 MG/DL (ref 70–130)
GLUCOSE BLDC GLUCOMTR-MCNC: 324 MG/DL (ref 70–130)
GLUCOSE BLDC GLUCOMTR-MCNC: 369 MG/DL (ref 70–130)
GLUCOSE BLDC GLUCOMTR-MCNC: 417 MG/DL (ref 70–130)
QT INTERVAL: 338 MS
QTC INTERVAL: 436 MS

## 2024-03-15 PROCEDURE — 25010000002 HEPARIN (PORCINE) PER 1000 UNITS: Performed by: FAMILY MEDICINE

## 2024-03-15 PROCEDURE — 25010000002 METHYLPREDNISOLONE PER 40 MG: Performed by: NURSE PRACTITIONER

## 2024-03-15 PROCEDURE — 99233 SBSQ HOSP IP/OBS HIGH 50: CPT | Performed by: INTERNAL MEDICINE

## 2024-03-15 PROCEDURE — 97116 GAIT TRAINING THERAPY: CPT

## 2024-03-15 PROCEDURE — 93306 TTE W/DOPPLER COMPLETE: CPT | Performed by: INTERNAL MEDICINE

## 2024-03-15 PROCEDURE — 82948 REAGENT STRIP/BLOOD GLUCOSE: CPT

## 2024-03-15 PROCEDURE — 93306 TTE W/DOPPLER COMPLETE: CPT

## 2024-03-15 PROCEDURE — 25510000001 PERFLUTREN 6.52 MG/ML SUSPENSION: Performed by: INTERNAL MEDICINE

## 2024-03-15 PROCEDURE — 71046 X-RAY EXAM CHEST 2 VIEWS: CPT

## 2024-03-15 PROCEDURE — 63710000001 INSULIN LISPRO (HUMAN) PER 5 UNITS: Performed by: INTERNAL MEDICINE

## 2024-03-15 PROCEDURE — 63710000001 INSULIN LISPRO (HUMAN) PER 5 UNITS: Performed by: FAMILY MEDICINE

## 2024-03-15 RX ORDER — FUROSEMIDE 10 MG/ML
40 INJECTION INTRAMUSCULAR; INTRAVENOUS DAILY
Status: DISCONTINUED | OUTPATIENT
Start: 2024-03-16 | End: 2024-03-17

## 2024-03-15 RX ORDER — INSULIN LISPRO 100 [IU]/ML
3-14 INJECTION, SOLUTION INTRAVENOUS; SUBCUTANEOUS
Status: DISCONTINUED | OUTPATIENT
Start: 2024-03-15 | End: 2024-03-19 | Stop reason: HOSPADM

## 2024-03-15 RX ADMIN — METHYLPREDNISOLONE SODIUM SUCCINATE 40 MG: 40 INJECTION, POWDER, FOR SOLUTION INTRAMUSCULAR; INTRAVENOUS at 23:30

## 2024-03-15 RX ADMIN — METHYLPREDNISOLONE SODIUM SUCCINATE 40 MG: 40 INJECTION, POWDER, FOR SOLUTION INTRAMUSCULAR; INTRAVENOUS at 12:39

## 2024-03-15 RX ADMIN — METHYLPREDNISOLONE SODIUM SUCCINATE 40 MG: 40 INJECTION, POWDER, FOR SOLUTION INTRAMUSCULAR; INTRAVENOUS at 00:28

## 2024-03-15 RX ADMIN — PIOGLITAZONE 30 MG: 30 TABLET ORAL at 09:56

## 2024-03-15 RX ADMIN — DOXYCYCLINE 100 MG: 100 TABLET, FILM COATED ORAL at 09:56

## 2024-03-15 RX ADMIN — Medication 10 ML: at 22:21

## 2024-03-15 RX ADMIN — HEPARIN SODIUM 5000 UNITS: 5000 INJECTION INTRAVENOUS; SUBCUTANEOUS at 22:20

## 2024-03-15 RX ADMIN — PERFLUTREN 6.52 MG: 6.52 INJECTION, SUSPENSION INTRAVENOUS at 14:52

## 2024-03-15 RX ADMIN — INSULIN LISPRO 12 UNITS: 100 INJECTION, SOLUTION INTRAVENOUS; SUBCUTANEOUS at 22:20

## 2024-03-15 RX ADMIN — Medication 10 ML: at 09:56

## 2024-03-15 RX ADMIN — INSULIN LISPRO 4 UNITS: 100 INJECTION, SOLUTION INTRAVENOUS; SUBCUTANEOUS at 09:59

## 2024-03-15 RX ADMIN — INSULIN LISPRO 12 UNITS: 100 INJECTION, SOLUTION INTRAVENOUS; SUBCUTANEOUS at 17:05

## 2024-03-15 RX ADMIN — METHYLPREDNISOLONE SODIUM SUCCINATE 40 MG: 40 INJECTION, POWDER, FOR SOLUTION INTRAMUSCULAR; INTRAVENOUS at 05:58

## 2024-03-15 RX ADMIN — SERTRALINE 50 MG: 50 TABLET, FILM COATED ORAL at 09:56

## 2024-03-15 RX ADMIN — METHYLPREDNISOLONE SODIUM SUCCINATE 40 MG: 40 INJECTION, POWDER, FOR SOLUTION INTRAMUSCULAR; INTRAVENOUS at 17:34

## 2024-03-15 RX ADMIN — HEPARIN SODIUM 5000 UNITS: 5000 INJECTION INTRAVENOUS; SUBCUTANEOUS at 09:56

## 2024-03-15 RX ADMIN — FAMOTIDINE 40 MG: 20 TABLET, FILM COATED ORAL at 09:57

## 2024-03-15 RX ADMIN — DOXYCYCLINE 100 MG: 100 TABLET, FILM COATED ORAL at 22:20

## 2024-03-15 RX ADMIN — GABAPENTIN 600 MG: 300 CAPSULE ORAL at 09:59

## 2024-03-15 RX ADMIN — ROSUVASTATIN CALCIUM 10 MG: 10 TABLET, FILM COATED ORAL at 09:56

## 2024-03-15 RX ADMIN — INSULIN LISPRO 10 UNITS: 100 INJECTION, SOLUTION INTRAVENOUS; SUBCUTANEOUS at 12:39

## 2024-03-15 RX ADMIN — GABAPENTIN 600 MG: 300 CAPSULE ORAL at 22:20

## 2024-03-15 RX ADMIN — AMLODIPINE BESYLATE 5 MG: 5 TABLET ORAL at 09:56

## 2024-03-15 NOTE — PROGRESS NOTES
[facility] PULMONARY AND CRITICAL CARE PROGRESS NOTE - [facility]    Patient: Kolby Michel  1955   MR# [id number]   Acct# [id number]  03/15/24   07:20 CDT  Referring Provider: Андрей Ledesma MD    Chief Complaint: Shortness of breath    Interval history: He is afebrile. He is 3L NC with sat of 94%. Plans were to do home O2 eval and discharge home soon on oxygen. He unfortunately required 12L with exertion so pulmonary stayed on the case and ordered solu medrol. Blood Glucose is 267. WBC is titrated down to 13.15. His antibiotic has been changed to Doxycycline oral. Discussed outpatient work up with he and his wife. Further recommendations per Dr. Contreras.        Meds:  amLODIPine, 5 mg, Oral, Daily  doxycycline, 100 mg, Oral, Q12H  famotidine, 40 mg, Oral, Daily  gabapentin, 600 mg, Oral, Q12H  heparin (porcine), 5,000 Units, Subcutaneous, Q12H  insulin lispro, 2-7 Units, Subcutaneous, 4x Daily AC & at Bedtime  methylPREDNISolone sodium succinate, 40 mg, Intravenous, Q6H  pioglitazone, 30 mg, Oral, Daily  rosuvastatin, 10 mg, Oral, Daily  sertraline, 50 mg, Oral, Daily  sodium chloride, 10 mL, Intravenous, Q12H           Physical Exam:  SpO2 Percentage    03/14/24 1804 03/14/24 2017 03/15/24 0314   SpO2: 92% 90% 94%     Body mass index is 40.01 kg/m².   Temp:  [97.9 °F (36.6 °C)-99 °F (37.2 °C)] 99 °F (37.2 °C)  Heart Rate:  [] 79  Resp:  [18-20] 20  BP: (131-151)/(61-74) 151/74No intake or output data in the 24 hours ending 03/15/24 0720    Physical Exam  Constitutional:       Appearance: Normal appearance.      Interventions: Nasal cannula in place.      Comments: 3L   HENT:      Head: Normocephalic and atraumatic.   Cardiovascular:      Rate and Rhythm: Normal rate and regular rhythm.      Heart sounds: No murmur heard.     No friction rub. No gallop.   Pulmonary:      Effort: Pulmonary effort is normal.      Breath sounds: Decreased breath sounds present.   Abdominal:      General:  Bowel sounds are normal. There is no distension.      Palpations: Abdomen is soft.   Musculoskeletal:      Right lower leg: No edema.      Left lower leg: No edema.   Skin:     General: Skin is warm and dry.   Neurological:      General: No focal deficit present.      Mental Status: He is alert and oriented to person, place, and time.   Psychiatric:         Mood and Affect: Mood normal.         Behavior: Behavior normal.       Electronically signed by JUSTYNA Welch, 3/15/2024, 07:25 CDT      Physician Substantive Portion: Medical Decision Making to follow:    Physician substantive portion: medical decision making  Result Review  Laboratory Data:  Results from last 7 days   Lab Units 03/14/24  0426 03/13/24  0144 03/12/24  0242   WBC 10*3/mm3 13.15* 15.37* 17.64*   HEMOGLOBIN g/dL 12.0* 12.4* 13.3   PLATELETS 10*3/mm3 176 158 157     Results from last 7 days   Lab Units 03/14/24  0426 03/13/24  0144 03/12/24  0242 03/11/24  1909   SODIUM mmol/L 138 137 135*  --    POTASSIUM mmol/L 4.0 3.7 3.9  --    CO2 mmol/L 29.0 28.0 28.0  --    BUN mg/dL 29* 34* 37*  --    CREATININE mg/dL 0.96 1.08 1.89*  --    LACTATE mmol/L  --   --   --  0.9     Results from last 7 days   Lab Units 03/11/24  1907   PH, ARTERIAL pH units 7.426   PCO2, ARTERIAL mm Hg 44.6   PO2 ART mm Hg 70.0*     Microbiology Results (last 10 days)       Procedure Component Value - Date/Time    S. Pneumo Ag Urine or CSF - Urine, Urine, Clean Catch [[id number]]  (Normal) Collected: 03/12/24 1134    Lab Status: Final result Specimen: Urine, Clean Catch Updated: 03/12/24 1209     Strep Pneumo Ag Negative    Legionella Antigen, Urine - Urine, Urine, Clean Catch [[id number]]  (Normal) Collected: 03/12/24 1134    Lab Status: Final result Specimen: Urine, Clean Catch Updated: 03/12/24 2120     LEGIONELLA ANTIGEN, URINE Negative    Respiratory Panel PCR w/COVID-19(SARS-CoV-2) JUDITH/SELENE/LYLE/PAD/COR/NEERU In-House, NP Swab in UTM/VTM, 2 HR TAT - Swab, Nasopharynx  [[id number]]  (Normal) Collected: 03/12/24 0831    Lab Status: Final result Specimen: Swab from Nasopharynx Updated: 03/12/24 1007     ADENOVIRUS, PCR Not Detected     Coronavirus 229E Not Detected     Coronavirus HKU1 Not Detected     Coronavirus NL63 Not Detected     Coronavirus OC43 Not Detected     COVID19 Not Detected     Human Metapneumovirus Not Detected     Human Rhinovirus/Enterovirus Not Detected     Influenza A PCR Not Detected     Influenza B PCR Not Detected     Parainfluenza Virus 1 Not Detected     Parainfluenza Virus 2 Not Detected     Parainfluenza Virus 3 Not Detected     Parainfluenza Virus 4 Not Detected     RSV, PCR Not Detected     Bordetella pertussis pcr Not Detected     Bordetella parapertussis PCR Not Detected     Chlamydophila pneumoniae PCR Not Detected     Mycoplasma pneumo by PCR Not Detected    Narrative:      In the setting of a positive respiratory panel with a viral infection PLUS a negative procalcitonin without other underlying concern for bacterial infection, consider observing off antibiotics or discontinuation of antibiotics and continue supportive care. If the respiratory panel is positive for atypical bacterial infection (Bordetella pertussis, Chlamydophila pneumoniae, or Mycoplasma pneumoniae), consider antibiotic de-escalation to target atypical bacterial infection.    Blood Culture - Blood, Arm, Left [[id number]]  (Normal) Collected: 03/11/24 1909    Lab Status: Preliminary result Specimen: Blood from Arm, Left Updated: 03/15/24 1930     Blood Culture No growth at 4 days    Mycoplasma Pneumoniae Antibody, IgM - Blood, Arm, Left [[id number]] Collected: 03/11/24 1909    Lab Status: Final result Specimen: Blood from Arm, Left Updated: 03/13/24 1513     M pneumoniae IgM Abs <770 U/mL      Comment:                              Negative            <770  Clinically significant amount of M. pneumoniae antibody  not detected.                               Low Positive   770 - 950  M.  pneumoniae specific IgM presumptively detected.  It  is recommended that another sample be collected 1-2  weeks later to assure reactivity.                               Positive            >950  Highly significant amount of M. pneumoniae specific  IgM antibody detected.       Narrative:      Performed at:   - [address]  [id number]  : Enrrique Ernandez PhD, Phone:  [phone]    Blood Culture - Blood, Arm, Right [[id number]]  (Normal) Collected: 03/11/24 1908    Lab Status: Preliminary result Specimen: Blood from Arm, Right Updated: 03/15/24 1930     Blood Culture No growth at 4 days           Recent films:  XR Chest PA & Lateral    Result Date: 3/15/2024  EXAMINATION: XR CHEST PA AND LATERAL- 3/15/2024 2:00 PM  HISTORY: hypoxemia; R13.10-Dysphagia, unspecified; Z74.09-Other reduced mobility; J96.01-Acute respiratory failure with hypoxia.  REPORT: Frontal and lateral views of the chest were obtained.  COMPARISON: Chest x-rays 3/12/2024.  The lungs are grossly hypoaerated, with basilar atelectasis as before. No lobar consolidation is identified. There is central and basilar vascular congestion which is also unchanged and likely related to the low lung volumes. The heart margins are mostly obscured. No pneumothorax is identified. There is no definite pleural effusion. No acute osseous abnormality. There is mild to moderate dextroscoliosis of the midthoracic spine. The upper abdomen appears unremarkable.      Impression: Gross hypoaeration of the lungs with extensive basilar atelectasis as well as central and basilar vascular congestion. More mild lower lobe is not excluded with this appearance.  This report was signed and finalized on 3/15/2024 2:02 PM by Dr. Mundo Sigala MD.      Walking Oximetry    Result Date: 3/15/2024  Zainab Louise CRT     3/15/2024  9:16 AM Exercise Oximetry Patient Name:Kolby Michel MRN: [id number] Date: 03/15/24         ROOM AIR  BASELINE SpO2% 84 Heart Rate 91 Blood Pressure  EXERCISE ON ROOM AIR SpO2% EXERCISE ON O2 @ 2 LPM SpO2% 1 MINUTE  1 MINUTE                    3    84 2 MINUTES  2 MINUTES                  4    86 3 MINUTES  3 MINUTES                  6    92 4 MINUTES  4 MINUTES                  6    91 5 MINUTES  5 MINUTES                 6    93 6 MINUTES  6 MINUTES           Distance Walked   Distance Walked                100ft Dyspnea (Daisy Scale)   Dyspnea (Daisy Scale) Fatigue (Daisy Scale)   Fatigue (Daisy Scale) SpO2% Post Exercise   SpO2% Post Exercise        93 HR Post Exercise   HR Post Exercise               98 Time to Recovery   Time to Recovery Comments:  Patient dropped to 84% on room air. 2L was applied to patient, after a few steps had to increase to 4 L, SPO2 only increased to 86%. Increased to 6L, patient walked 100 ft and SPO2 remained at 91% or better. Patient will need 4L of oxygen at rest and 6L of oxygen when ambulating.     Walking Oximetry    Result Date: 3/14/2024  Aida Andrew, STEPHANY     3/14/2024  2:41 PM Exercise Oximetry Patient Name:Kolby Michel MRN: [id number] Date: 03/14/24         ROOM AIR BASELINE SpO2% 86 Heart Rate  Blood Pressure  EXERCISE ON ROOM AIR SpO2% EXERCISE ON O2 @ 12 LPM SpO2% 1 MINUTE  1 MINUTE    90 2 MINUTES  2 MINUTES    90 3 MINUTES  3 MINUTES  4 MINUTES  4 MINUTES  5 MINUTES  5 MINUTES  6 MINUTES  6 MINUTES           Distance Walked   Distance Walked 300 feet Dyspnea (Daisy Scale)   Dyspnea (Daisy Scale) Fatigue (Daisy Scale)   Fatigue (Daisy Scale) SpO2% Post Exercise   SpO2% Post Exercise HR Post Exercise  HR Post Exercise Time to Recovery   Time to Recovery   Comments:  Pt sat dropped very quickly when placed on room air to 86%.  Placed patient back on oxygen.  While walking, his oxygen had to be increased to 12 LPM NC to keep sat at 90%.  Patient was very short of breath when finished but stated he did not walk that far at home..  RN Thea Gamble notified.    Personal  review of imaging : CXR shows bibasilar atelectasis.  Low lung volumes and fluid overload      Pulmonary Assessment:    Recurrent acute on chronic respiratory failure  Dependent on supplemental oxygen  Congestive diastolic heart failure fluid overload  Bibasilar atelectasis  History of COVID infection  Obesity with possible sleep apnea    Recommend/plan:   Patient was seen in the follow-up visit in pulmonology medical floor today  He was sitting up in the chair on 4 L of oxygen needs 6 L on walking  He his wife was at the bedside.  Patient started on Solu-Medrol  Blood glucose running high and needs to be monitored closely  Insulin for blood glucose control.  Bronchodilator treatment routine respiratory care  On doxycycline for antibiotic coverage.  Diuresis as needed  Patient has bilateral ankle edema.  I added Lasix 40 daily.  Chest x-ray shows low lung volume  Bibasilar crackles also noted continue titrating oxygen keep saturation more than 92%  Patient may have some sleep apnea for which workup will be needed when discharged.  DVT and stress ulcer prophylaxis pain and anxiety control  Repeat labs and urine studies from time to time.  PT/OT/nutritional support  CODE STATUS: Full.  Overall prognosis: Guarded  We will follow.    Time spent by me: 35 min    This visit was performed by both a physician and an [facility] RN.  I performed all aspects of the medical decision making as documented.    Electronically signed by     Jagruti Contreras MD,  Pulmonologist/Intensivist   3/15/2024, 20:06 CDT

## 2024-03-15 NOTE — PLAN OF CARE
Goal Outcome Evaluation:   Pt resting in chair through the night, up ad law to the bathroom, wife at bedside. Pt is A&o x4 through shift, NSR on tele. No complaints of pain through the night.  Problem: Adult Inpatient Plan of Care  Goal: Plan of Care Review  Outcome: Ongoing, Progressing  Goal: Patient-Specific Goal (Individualized)  Outcome: Ongoing, Progressing  Goal: Absence of Hospital-Acquired Illness or Injury  Outcome: Ongoing, Progressing  Intervention: Identify and Manage Fall Risk  Recent Flowsheet Documentation  Taken 3/15/2024 0200 by Zeny Gomez RN  Safety Promotion/Fall Prevention: safety round/check completed  Taken 3/15/2024 0000 by Zeny Gomez RN  Safety Promotion/Fall Prevention: safety round/check completed  Taken 3/14/2024 2200 by Zeny Gomez RN  Safety Promotion/Fall Prevention: safety round/check completed  Taken 3/14/2024 2100 by Zeny Gomez RN  Safety Promotion/Fall Prevention: safety round/check completed  Taken 3/14/2024 2000 by Zeny Gomez RN  Safety Promotion/Fall Prevention: safety round/check completed  Taken 3/14/2024 1900 by Zeny Gomez RN  Safety Promotion/Fall Prevention: safety round/check completed  Intervention: Prevent Skin Injury  Recent Flowsheet Documentation  Taken 3/14/2024 2000 by Zeny Gomez RN  Body Position: position changed independently  Intervention: Prevent and Manage VTE (Venous Thromboembolism) Risk  Recent Flowsheet Documentation  Taken 3/14/2024 2000 by Zeny Gomez RN  Activity Management: activity encouraged  Goal: Optimal Comfort and Wellbeing  Outcome: Ongoing, Progressing  Goal: Readiness for Transition of Care  Outcome: Ongoing, Progressing     Problem: Noninvasive Ventilation Acute  Goal: Effective Unassisted Ventilation and Oxygenation  Outcome: Ongoing, Progressing     Problem: Fall Injury Risk  Goal: Absence of Fall and Fall-Related Injury  Outcome: Ongoing, Progressing  Intervention: Promote  Injury-Free Environment  Recent Flowsheet Documentation  Taken 3/15/2024 0200 by Zeny Gomez RN  Safety Promotion/Fall Prevention: safety round/check completed  Taken 3/15/2024 0000 by Zeny Gomez RN  Safety Promotion/Fall Prevention: safety round/check completed  Taken 3/14/2024 2200 by Zeny Gomez RN  Safety Promotion/Fall Prevention: safety round/check completed  Taken 3/14/2024 2100 by Zeny Gomez RN  Safety Promotion/Fall Prevention: safety round/check completed  Taken 3/14/2024 2000 by Zeny Gomez RN  Safety Promotion/Fall Prevention: safety round/check completed  Taken 3/14/2024 1900 by Zeny Gomez RN  Safety Promotion/Fall Prevention: safety round/check completed

## 2024-03-15 NOTE — THERAPY TREATMENT NOTE
Acute Care - Physical Therapy Treatment Note  [facility]     Patient Name: Kolby Michel  : 1955  MRN: [id number]  Today's Date: 3/15/2024      Visit Dx:     ICD-10-CM ICD-9-CM   1. Dysphagia, unspecified type [R13.10]  R13.10 787.20   2. Impaired mobility [Z74.09]  Z74.09 799.89   3. Acute respiratory failure with hypoxemia  J96.01 518.81     Patient Active Problem List   Diagnosis    Acute respiratory failure with hypoxemia    Atypical pneumonia    DM (diabetes mellitus), type 2    Essential hypertension, benign    Obesity (BMI 30-39.9)    Pneumonia, unspecified organism    History of COVID-19    Orthopnea     Past Medical History:   Diagnosis Date    Diabetes mellitus     Hyperlipidemia     Hypertension      History reviewed. No pertinent surgical history.  PT Assessment (Last 12 Hours)       PT Evaluation and Treatment       Row Name 03/15/24 1505          Physical Therapy Time and Intention    Subjective Information no complaints  -     Document Type therapy note (daily note)  -     Mode of Treatment physical therapy  -       Row Name 03/15/24 1505          General Information    Existing Precautions/Restrictions fall;oxygen therapy device and L/min  -       Row Name 03/15/24 1505          Pain    Pretreatment Pain Rating 0/10 - no pain  -     Posttreatment Pain Rating 0/10 - no pain  -       Row Name 03/15/24 1505          Sit-Stand Transfer    Sit-Stand [place] (Transfers) standby assist  -       Row Name 03/15/24 1505          Stand-Sit Transfer    Stand-Sit [place] (Transfers) standby assist  -       Row Name 03/15/24 1505          Gait/Stairs (Locomotion)    [place] Level (Gait) standby assist  -     Distance in Feet (Gait) 250  -BESSY       Row Name 03/15/24 1505          Positioning and Restraints    Pre-Treatment Position sitting in chair/recliner  -     In Chair reclined;call light within reach;encouraged to call for assist;with family/caregiver  -                User Key  (r) = Recorded By, (t) = Taken By, (c) = Cosigned By      Initials Name Provider Type    BESSY Nicolasa Prabhakar PTA Physical Therapist Assistant                    Physical Therapy Education       Title: PT OT SLP Therapies (In Progress)       Topic: Physical Therapy (In Progress)       Point: Mobility training (In Progress)       Learning Progress Summary             Patient Acceptance, E, NR by BESSY at 3/15/2024 1543    Comment: energy conservation    Acceptance, E,D, NR by BESSY at 3/14/2024 1206    Comment: increasing Base of support    Acceptance, E, VU by ADELA at 3/12/2024 1152    Comment: stair negotiation safety with O2 demands, gait and long distance walks with proper breathing tecnhiques   Family Acceptance, E, VU by ADELA at 3/12/2024 1152    Comment: stair negotiation safety with O2 demands, gait and long distance walks with proper breathing tecnhiques                         Point: Precautions (In Progress)       Learning Progress Summary             Patient Acceptance, E,D, NR by BESSY at 3/13/2024 1239    Comment: purse lip breathing    Acceptance, E, VU by ADELA at 3/12/2024 1152    Comment: stair negotiation safety with O2 demands, gait and long distance walks with proper breathing tecnhiques   Family Acceptance, E, VU by ADELA at 3/12/2024 1152    Comment: stair negotiation safety with O2 demands, gait and long distance walks with proper breathing tecnhiques                                         User Key       Initials Effective Dates Name Provider Type Discipline     02/03/23 -  Nicolasa Prabhakar PTA Physical Therapist Assistant PT    ADELA 02/22/24 -  Chay Mccollum, OTF Student PT Student PT                  PT Recommendation and Plan         Outcome Measures       Row Name 03/15/24 1500 03/14/24 1200 03/13/24 1200       How much help from another person do you currently need...    Turning from your back to your side while in flat bed without using bedrails? 4  -BESSY 4  -BESSY 4  -BESSY    Moving from lying on  back to sitting on the side of a flat bed without bedrails? 4  -BESSY 4  -BESSY 4  -BESSY    Moving to and from a bed to a chair (including a wheelchair)? 4  -BESSY 4  -BESSY 4  -BESSY    Standing up from a chair using your arms (e.g., wheelchair, bedside chair)? 4  -BESSY 4  -BESSY 4  -BESSY    Climbing 3-5 steps with a railing? 4  -BESSY 3  -BESSY 4  -BESSY    To walk in hospital room? 4  -BESSY 4  -BESSY 4  -BESSY    AM-PAC 6 Clicks Score (PT) 24  -BESSY 23  -BESSY 24  -BESSY    Highest Level of Mobility Goal 8 --> Walked 250 feet or more  -BESSY 7 --> Walk 25 feet or more  -BESSY 8 --> Walked 250 feet or more  -BESSY              User Key  (r) = Recorded By, (t) = Taken By, (c) = Cosigned By      Initials Name Provider Type    Nicolasa Navarrete PTA Physical Therapist Assistant                     Time Calculation:    PT Charges       Row Name 03/15/24 1544             Time Calculation    Start Time 1505  -BESSY      Stop Time 1520  -BESSY      Time Calculation (min) 15 min  -BESSY         Timed Charges    11362 - Gait Training Minutes  15  -BESSY         Total Minutes    Timed Charges Total Minutes 15  -BESSY       Total Minutes 15  -BESSY                User Key  (r) = Recorded By, (t) = Taken By, (c) = Cosigned By      Initials Name Provider Type    Nicolasa Navarrete PTA Physical Therapist Assistant                  Therapy Charges for Today       Code Description Service Date Service Provider Modifiers Qty    31071069947 HC GAIT TRAINING EA 15 MIN 3/14/2024 Nicolasa Prabhakar PTA GP 1    69190905551 HC GAIT TRAINING EA 15 MIN 3/15/2024 Nicolasa Prabhakar PTA GP 1            PT G-Codes  Outcome Measure Options: AM-PAC 6 Clicks Basic Mobility (PT)  AM-PAC 6 Clicks Score (PT): 24    Nicolasa Prabhakar PTA  3/15/2024

## 2024-03-15 NOTE — PROGRESS NOTES
[facility] Medicine Services  INPATIENT PROGRESS NOTE    Patient Name: Kolby Michel  Date of Admission: 3/11/2024  Today's Date: 03/15/24  Length of Stay: 4  Primary Care Physician: Marcos Figueroa MD    Subjective   Chief Complaint: Shortness of breath  HPI   Patient states that overall he is feeling better.  His GI symptoms including vomiting have resolved.  He is no longer having fever.  He does not report purulent sputum production.  He does have dyspnea with exertion.  He denies any chest pain or chest pressure.  He does describe ongoing symptoms of orthopnea dating back an extended period of time.  He does report having a similar presentation about a year ago when he was also diagnosed with COVID, describes initially having symptoms of a head cold, but in the aftermath began having symptoms of shortness of breath and even reported that he required outpatient supplemental oxygen for a period of time.    Review of Systems   All pertinent negatives and positives are as above. All other systems have been reviewed and are negative unless otherwise stated.     Objective    Temp:  [97.7 °F (36.5 °C)-99 °F (37.2 °C)] 97.8 °F (36.6 °C)  Heart Rate:  [79-94] 86  Resp:  [18-20] 18  BP: (131-162)/(61-74) 142/63  Physical Exam  Vitals reviewed.   Constitutional:       Appearance: He is not ill-appearing or toxic-appearing.   HENT:      Head: Normocephalic.      Mouth/Throat:      Mouth: Mucous membranes are moist.   Eyes:      Pupils: Pupils are equal, round, and reactive to light.   Cardiovascular:      Rate and Rhythm: Normal rate.   Pulmonary:      Effort: Pulmonary effort is normal. No respiratory distress.      Comments: Diminished at bases; R>L; on supp 02 via nasal cannula  Musculoskeletal:      Right lower leg: Edema present.      Left lower leg: Edema present.   Skin:     General: Skin is warm.   Neurological:      General: No focal deficit present.      Mental Status: He  is alert.   Psychiatric:         Mood and Affect: Mood normal.             Results Review:  I have reviewed the labs, radiology results, and diagnostic studies.    Laboratory Data:   Results from last 7 days   Lab Units 03/14/24 0426 03/13/24 0144 03/12/24  0242   WBC 10*3/mm3 13.15* 15.37* 17.64*   HEMOGLOBIN g/dL 12.0* 12.4* 13.3   HEMATOCRIT % 37.3* 38.8 41.3   PLATELETS 10*3/mm3 176 158 157        Results from last 7 days   Lab Units 03/14/24 0426 03/13/24  0144 03/12/24  0242   SODIUM mmol/L 138 137 135*   POTASSIUM mmol/L 4.0 3.7 3.9   CHLORIDE mmol/L 102 100 95*   CO2 mmol/L 29.0 28.0 28.0   BUN mg/dL 29* 34* 37*   CREATININE mg/dL 0.96 1.08 1.89*   CALCIUM mg/dL 8.7 8.6 8.7   BILIRUBIN mg/dL  --  0.5 0.8   ALK PHOS U/L  --  103 92   ALT (SGPT) U/L  --  22 22   AST (SGOT) U/L  --  20 21   GLUCOSE mg/dL 169* 144* 154*       Culture Data:   Blood Culture   Date Value Ref Range Status   03/11/2024 No growth at 3 days  Preliminary   03/11/2024 No growth at 3 days  Preliminary       Radiology Data:   Imaging Results (Last 24 Hours)       ** No results found for the last 24 hours. **            I have reviewed the patient's current medications.     Assessment/Plan   Assessment  Active Hospital Problems    Diagnosis     **Acute respiratory failure with hypoxemia     History of COVID-19     Orthopnea     Pneumonia, unspecified organism     Atypical pneumonia     DM (diabetes mellitus), type 2     Essential hypertension, benign     Obesity (BMI 30-39.9)        Treatment Plan  Pulmonary following and appreciate their assistance  PA and LAT CXR today  Walking oximetry study as follows: Patient dropped to 84% on room air. 2L was applied to patient, after a few steps had to increase to 4 L, SPO2 only increased to 86%. Increased to 6L, patient walked 100 ft and SPO2 remained at 91% or better. Patient will need 4L of oxygen at rest and 6L of oxygen when ambulating.   4.   IV Solumedrol  5.   PO Doxycycline (completed 3  days of Azithromycin and Ceftriaxone, then transitioned to Doxycyline)  6.   Incentive spirometry  7.   Hyperglycemia exacerbated by ongoing steroid administration.  Adjust SSI coverage.    8.   Would really like CT Chest. Patient reports that multiple providers have recommended getting a CT Chest; he has attempted multiple times but failed each time due to what sounds like orthopnea.  ProBNP normal.  He does have lower extremity edema on exam.  I plan to get Echocardiogram today.    Medical Decision Making  Number and Complexity of problems: 2 acute; high complexity      Conditions and Status        Condition is improving.     [facility] Data  External documents reviewed: none  Cardiac tracing (EKG, telemetry) interpretation: no new EKGs  Radiology interpretation: no new radiology studies  Labs reviewed: as above  Any tests that were considered but not ordered: CT Chest     Decision rules/scores evaluated (example [id number], Wells, etc): none     Discussed with: patient     Care Planning  Shared decision making: Discussed at length with patient with agreement to proceed with treatment plan as outlined  Code status and discussions: Full Code    Disposition  Social Determinants of Health that impact treatment or disposition: none  I expect the patient to be discharged to home in 2-3 days    Electronically signed by Андрей Ledesma MD, 03/15/24, 11:50 CDT.

## 2024-03-15 NOTE — PROCEDURES
Exercise Oximetry    Patient Name:Kolby Michel   MRN: [id number]   Date: 03/15/24             ROOM AIR BASELINE   SpO2% 84   Heart Rate 91   Blood Pressure      EXERCISE ON ROOM AIR SpO2% EXERCISE ON O2 @ 2 LPM SpO2%   1 MINUTE  1 MINUTE                    3    84   2 MINUTES  2 MINUTES                  4    86   3 MINUTES  3 MINUTES                  6    92   4 MINUTES  4 MINUTES                  6    91   5 MINUTES  5 MINUTES                 6    93   6 MINUTES  6 MINUTES               Distance Walked   Distance Walked                100ft   Dyspnea (Daisy Scale)   Dyspnea (Daisy Scale)   Fatigue (Daisy Scale)   Fatigue (Daisy Scale)   SpO2% Post Exercise   SpO2% Post Exercise        93   HR Post Exercise   HR Post Exercise               98   Time to Recovery   Time to Recovery     Comments:  Patient dropped to 84% on room air. 2L was applied to patient, after a few steps had to increase to 4 L, SPO2 only increased to 86%. Increased to 6L, patient walked 100 ft and SPO2 remained at 91% or better. Patient will need 4L of oxygen at rest and 6L of oxygen when ambulating.

## 2024-03-15 NOTE — PLAN OF CARE
Goal Outcome Evaluation:              Outcome Evaluation: VSS, on 4L/NC, walk ox determined pt needs 6L/NC with ambulation, IV steroids continue, states had BM today and voided several times, up in chair all day, safety maintained

## 2024-03-15 NOTE — PLAN OF CARE
Goal Outcome Evaluation:      Patient stands and ambulates 250' with SBA.

## 2024-03-16 ENCOUNTER — APPOINTMENT (OUTPATIENT)
Dept: ULTRASOUND IMAGING | Facility: HOSPITAL | Age: 69
DRG: 189 | End: 2024-03-16
Payer: MEDICARE

## 2024-03-16 PROBLEM — R16.0 LIVER MASS: Status: ACTIVE | Noted: 2024-03-16

## 2024-03-16 LAB
ANION GAP SERPL CALCULATED.3IONS-SCNC: 5 MMOL/L (ref 5–15)
BACTERIA SPEC AEROBE CULT: NORMAL
BACTERIA SPEC AEROBE CULT: NORMAL
BUN SERPL-MCNC: 36 MG/DL (ref 8–23)
BUN/CREAT SERPL: 35 (ref 7–25)
CALCIUM SPEC-SCNC: 9.5 MG/DL (ref 8.6–10.5)
CHLORIDE SERPL-SCNC: 101 MMOL/L (ref 98–107)
CO2 SERPL-SCNC: 34 MMOL/L (ref 22–29)
CREAT SERPL-MCNC: 1.03 MG/DL (ref 0.76–1.27)
DEPRECATED RDW RBC AUTO: 50.8 FL (ref 37–54)
EGFRCR SERPLBLD CKD-EPI 2021: 78.1 ML/MIN/1.73
ERYTHROCYTE [DISTWIDTH] IN BLOOD BY AUTOMATED COUNT: 15.1 % (ref 12.3–15.4)
GLUCOSE BLDC GLUCOMTR-MCNC: 296 MG/DL (ref 70–130)
GLUCOSE BLDC GLUCOMTR-MCNC: 301 MG/DL (ref 70–130)
GLUCOSE BLDC GLUCOMTR-MCNC: 387 MG/DL (ref 70–130)
GLUCOSE BLDC GLUCOMTR-MCNC: 408 MG/DL (ref 70–130)
GLUCOSE SERPL-MCNC: 366 MG/DL (ref 65–99)
HCT VFR BLD AUTO: 40.8 % (ref 37.5–51)
HGB BLD-MCNC: 13 G/DL (ref 13–17.7)
LYMPHOCYTES # BLD MANUAL: 0.83 10*3/MM3 (ref 0.7–3.1)
LYMPHOCYTES NFR BLD MANUAL: 6.1 % (ref 5–12)
MCH RBC QN AUTO: 29.2 PG (ref 26.6–33)
MCHC RBC AUTO-ENTMCNC: 31.9 G/DL (ref 31.5–35.7)
MCV RBC AUTO: 91.7 FL (ref 79–97)
METAMYELOCYTES NFR BLD MANUAL: 2 % (ref 0–0)
MONOCYTES # BLD: 1.01 10*3/MM3 (ref 0.1–0.9)
MYELOCYTES NFR BLD MANUAL: 2 % (ref 0–0)
NEUTROPHILS # BLD AUTO: 14.11 10*3/MM3 (ref 1.7–7)
NEUTROPHILS NFR BLD MANUAL: 83.8 % (ref 42.7–76)
NEUTS BAND NFR BLD MANUAL: 1 % (ref 0–5)
NRBC BLD AUTO-RTO: 0 /100 WBC (ref 0–0.2)
PLAT MORPH BLD: NORMAL
PLATELET # BLD AUTO: 247 10*3/MM3 (ref 140–450)
PMV BLD AUTO: 8.9 FL (ref 6–12)
POLYCHROMASIA BLD QL SMEAR: ABNORMAL
POTASSIUM SERPL-SCNC: 4.7 MMOL/L (ref 3.5–5.2)
RBC # BLD AUTO: 4.45 10*6/MM3 (ref 4.14–5.8)
SODIUM SERPL-SCNC: 140 MMOL/L (ref 136–145)
VARIANT LYMPHS NFR BLD MANUAL: 1 % (ref 0–5)
VARIANT LYMPHS NFR BLD MANUAL: 4 % (ref 19.6–45.3)
WBC MORPH BLD: NORMAL
WBC NRBC COR # BLD AUTO: 16.63 10*3/MM3 (ref 3.4–10.8)

## 2024-03-16 PROCEDURE — 76705 ECHO EXAM OF ABDOMEN: CPT

## 2024-03-16 PROCEDURE — 63710000001 INSULIN DETEMIR PER 5 UNITS: Performed by: INTERNAL MEDICINE

## 2024-03-16 PROCEDURE — 63710000001 INSULIN LISPRO (HUMAN) PER 5 UNITS: Performed by: INTERNAL MEDICINE

## 2024-03-16 PROCEDURE — 85007 BL SMEAR W/DIFF WBC COUNT: CPT | Performed by: INTERNAL MEDICINE

## 2024-03-16 PROCEDURE — 80048 BASIC METABOLIC PNL TOTAL CA: CPT | Performed by: INTERNAL MEDICINE

## 2024-03-16 PROCEDURE — 82948 REAGENT STRIP/BLOOD GLUCOSE: CPT

## 2024-03-16 PROCEDURE — 99233 SBSQ HOSP IP/OBS HIGH 50: CPT | Performed by: INTERNAL MEDICINE

## 2024-03-16 PROCEDURE — 25010000002 METHYLPREDNISOLONE PER 40 MG: Performed by: NURSE PRACTITIONER

## 2024-03-16 PROCEDURE — 25010000002 FUROSEMIDE PER 20 MG: Performed by: INTERNAL MEDICINE

## 2024-03-16 PROCEDURE — 25010000002 HEPARIN (PORCINE) PER 1000 UNITS: Performed by: FAMILY MEDICINE

## 2024-03-16 PROCEDURE — 85025 COMPLETE CBC W/AUTO DIFF WBC: CPT | Performed by: INTERNAL MEDICINE

## 2024-03-16 RX ORDER — METHYLPREDNISOLONE SODIUM SUCCINATE 40 MG/ML
40 INJECTION, POWDER, LYOPHILIZED, FOR SOLUTION INTRAMUSCULAR; INTRAVENOUS EVERY 8 HOURS
Status: DISCONTINUED | OUTPATIENT
Start: 2024-03-16 | End: 2024-03-17

## 2024-03-16 RX ADMIN — AMLODIPINE BESYLATE 5 MG: 5 TABLET ORAL at 10:18

## 2024-03-16 RX ADMIN — FAMOTIDINE 40 MG: 20 TABLET, FILM COATED ORAL at 10:18

## 2024-03-16 RX ADMIN — ROSUVASTATIN CALCIUM 10 MG: 10 TABLET, FILM COATED ORAL at 10:18

## 2024-03-16 RX ADMIN — GABAPENTIN 600 MG: 300 CAPSULE ORAL at 21:18

## 2024-03-16 RX ADMIN — INSULIN DETEMIR 15 UNITS: 100 INJECTION, SOLUTION SUBCUTANEOUS at 12:00

## 2024-03-16 RX ADMIN — INSULIN LISPRO 14 UNITS: 100 INJECTION, SOLUTION INTRAVENOUS; SUBCUTANEOUS at 21:18

## 2024-03-16 RX ADMIN — HEPARIN SODIUM 5000 UNITS: 5000 INJECTION INTRAVENOUS; SUBCUTANEOUS at 21:18

## 2024-03-16 RX ADMIN — INSULIN LISPRO 12 UNITS: 100 INJECTION, SOLUTION INTRAVENOUS; SUBCUTANEOUS at 12:00

## 2024-03-16 RX ADMIN — SERTRALINE 50 MG: 50 TABLET, FILM COATED ORAL at 10:18

## 2024-03-16 RX ADMIN — PIOGLITAZONE 30 MG: 30 TABLET ORAL at 10:18

## 2024-03-16 RX ADMIN — GABAPENTIN 600 MG: 300 CAPSULE ORAL at 10:10

## 2024-03-16 RX ADMIN — INSULIN LISPRO 10 UNITS: 100 INJECTION, SOLUTION INTRAVENOUS; SUBCUTANEOUS at 17:49

## 2024-03-16 RX ADMIN — FUROSEMIDE 40 MG: 10 INJECTION, SOLUTION INTRAMUSCULAR; INTRAVENOUS at 10:56

## 2024-03-16 RX ADMIN — METHYLPREDNISOLONE SODIUM SUCCINATE 40 MG: 40 INJECTION, POWDER, LYOPHILIZED, FOR SOLUTION INTRAMUSCULAR; INTRAVENOUS at 22:09

## 2024-03-16 RX ADMIN — HEPARIN SODIUM 5000 UNITS: 5000 INJECTION INTRAVENOUS; SUBCUTANEOUS at 10:17

## 2024-03-16 RX ADMIN — Medication 10 ML: at 10:17

## 2024-03-16 RX ADMIN — DOXYCYCLINE 100 MG: 100 TABLET, FILM COATED ORAL at 21:18

## 2024-03-16 RX ADMIN — DOXYCYCLINE 100 MG: 100 TABLET, FILM COATED ORAL at 10:18

## 2024-03-16 RX ADMIN — Medication 10 ML: at 21:18

## 2024-03-16 RX ADMIN — METHYLPREDNISOLONE SODIUM SUCCINATE 40 MG: 40 INJECTION, POWDER, LYOPHILIZED, FOR SOLUTION INTRAMUSCULAR; INTRAVENOUS at 15:28

## 2024-03-16 RX ADMIN — INSULIN LISPRO 8 UNITS: 100 INJECTION, SOLUTION INTRAVENOUS; SUBCUTANEOUS at 10:16

## 2024-03-16 RX ADMIN — METHYLPREDNISOLONE SODIUM SUCCINATE 40 MG: 40 INJECTION, POWDER, FOR SOLUTION INTRAMUSCULAR; INTRAVENOUS at 06:16

## 2024-03-16 NOTE — PROGRESS NOTES
[facility] Medicine Services  INPATIENT PROGRESS NOTE    Patient Name: Kolby Michel  Date of Admission: 3/11/2024  Today's Date: 03/16/24  Length of Stay: 5  Primary Care Physician: Marcos Figueroa MD    Subjective   Chief Complaint: Shortness of breath  HPI   Patient states that overall he is feeling better.  Remains on 4 L of oxygen via nasal cannula.  Does report some worsening lower extremity edema.  We discussed the results of his echocardiogram.  He denies any pain symptoms.  Reports that he has been using his incentive spirometer.  Voices no new complaints this morning.    Review of Systems   All pertinent negatives and positives are as above. All other systems have been reviewed and are negative unless otherwise stated.     Objective    Temp:  [97.5 °F (36.4 °C)-98.3 °F (36.8 °C)] 97.5 °F (36.4 °C)  Heart Rate:  [79-92] 84  Resp:  [18] 18  BP: (135-147)/(63-72) 135/72  Physical Exam  Vitals reviewed.   Constitutional:       Appearance: He is not ill-appearing or toxic-appearing.   HENT:      Head: Normocephalic.      Mouth/Throat:      Mouth: Mucous membranes are moist.   Eyes:      Pupils: Pupils are equal, round, and reactive to light.   Cardiovascular:      Rate and Rhythm: Normal rate.   Pulmonary:      Effort: Pulmonary effort is normal. No respiratory distress.      Comments: Diminished at bases; R>L; on supp 02 via nasal cannula  Musculoskeletal:      Right lower leg: Edema present.      Left lower leg: Edema present.   Skin:     General: Skin is warm.   Neurological:      General: No focal deficit present.      Mental Status: He is alert.   Psychiatric:         Mood and Affect: Mood normal.             Results Review:  I have reviewed the labs, radiology results, and diagnostic studies.    Laboratory Data:   Results from last 7 days   Lab Units 03/16/24  0349 03/14/24  0426 03/13/24  0144   WBC 10*3/mm3 16.63* 13.15* 15.37*   HEMOGLOBIN g/dL 13.0 12.0* 12.4*    HEMATOCRIT % 40.8 37.3* 38.8   PLATELETS 10*3/mm3 247 176 158        Results from last 7 days   Lab Units 03/16/24  0349 03/14/24  0426 03/13/24  0144 03/12/24  0242   SODIUM mmol/L 140 138 137 135*   POTASSIUM mmol/L 4.7 4.0 3.7 3.9   CHLORIDE mmol/L 101 102 100 95*   CO2 mmol/L 34.0* 29.0 28.0 28.0   BUN mg/dL 36* 29* 34* 37*   CREATININE mg/dL 1.03 0.96 1.08 1.89*   CALCIUM mg/dL 9.5 8.7 8.6 8.7   BILIRUBIN mg/dL  --   --  0.5 0.8   ALK PHOS U/L  --   --  103 92   ALT (SGPT) U/L  --   --  22 22   AST (SGOT) U/L  --   --  20 21   GLUCOSE mg/dL 366* 169* 144* 154*       Culture Data:   Blood Culture   Date Value Ref Range Status   03/11/2024 No growth at 3 days  Preliminary   03/11/2024 No growth at 3 days  Preliminary       Radiology Data:   Imaging Results (Last 24 Hours)       Procedure Component Value Units Date/Time    XR Chest PA & Lateral [[id number]] Collected: 03/15/24 1400     Updated: 03/15/24 1405    Narrative:      EXAMINATION: XR CHEST PA AND LATERAL- 3/15/2024 2:00 PM     HISTORY: hypoxemia; R13.10-Dysphagia, unspecified; Z74.09-Other reduced  mobility; J96.01-Acute respiratory failure with hypoxia.     REPORT: Frontal and lateral views of the chest were obtained.     COMPARISON: Chest x-rays 3/12/2024.     The lungs are grossly hypoaerated, with basilar atelectasis as before.  No lobar consolidation is identified. There is central and basilar  vascular congestion which is also unchanged and likely related to the  low lung volumes. The heart margins are mostly obscured. No pneumothorax  is identified. There is no definite pleural effusion. No acute osseous  abnormality. There is mild to moderate dextroscoliosis of the  midthoracic spine. The upper abdomen appears unremarkable.       Impression:      Gross hypoaeration of the lungs with extensive basilar  atelectasis as well as central and basilar vascular congestion. More  mild lower lobe is not excluded with this appearance.     This report was  signed and finalized on 3/15/2024 2:02 PM by Dr. Mundo Sigala MD.               I have reviewed the patient's current medications.     Assessment/Plan   Assessment  Active Hospital Problems    Diagnosis     **Acute respiratory failure with hypoxemia     History of COVID-19     Orthopnea     Pneumonia, unspecified organism     Atypical pneumonia     DM (diabetes mellitus), type 2     Essential hypertension, benign     Obesity (BMI 30-39.9)        Treatment Plan  Pulmonary following and appreciate their assistance  PA and LAT CXR yesterday with suspected atelectasis and some central vascular congestion:    3.   Trial of IV Lasix  4.   Echocardiogram results as follows:  Results for orders placed during the hospital encounter of 03/11/24    Adult Transthoracic Echo Complete W/ Cont if Necessary Per Protocol    Interpretation Summary    Left ventricular systolic function is hyperdynamic (EF > 70%). Left ventricular ejection fraction appears to be greater than 70%.    Left ventricular diastolic function was normal.    Estimated right ventricular systolic pressure from tricuspid regurgitation is normal (<35 mmHg).    Normal size and function of the right ventricle.    No significant valvular pathology.    There is an incidental finding of a lesion/mass in the liver.  This could be a cyst or potentially hemangioma.  It looks to be approximately 5 x 3 cm.  Recommend further imaging if clinically indicated.    5.   Liver US today  6.   Walking oximetry study as follows: Patient dropped to 84% on room air. 2L was applied to patient, after a few steps had to increase to 4 L, SPO2 only increased to 86%. Increased to 6L, patient walked 100 ft and SPO2 remained at 91% or better. Patient will need 4L of oxygen at rest and 6L of oxygen when ambulating.   7.   IV Solumedrol with taper  8.   PO Doxycycline (completed 3 days of Azithromycin and Ceftriaxone, then transitioned to Doxycyline)  9.   Incentive spirometry  10.    Hyperglycemia exacerbated by ongoing steroid administration.  Adjust SSI coverage. Plan to add SQ insulin Levemir today       Medical Decision Making  Number and Complexity of problems: 2 acute; high complexity      Conditions and Status        Condition is improving.     [facility] Data  External documents reviewed: none  Cardiac tracing (EKG, telemetry) interpretation: no new EKGs  Radiology interpretation: no new radiology studies  Labs reviewed: as above  Any tests that were considered but not ordered: CT Chest     Decision rules/scores evaluated (example [id number], Wells, etc): none     Discussed with: patient and spouse, Eugenia     Care Planning  Shared decision making: Discussed at length with patient with agreement to proceed with treatment plan as outlined  Code status and discussions: Full Code    Disposition  Social Determinants of Health that impact treatment or disposition: none  I expect the patient to be discharged to home in 2-3 days    Electronically signed by Андрей Ledesma MD, 03/16/24, 10:54 CDT.

## 2024-03-16 NOTE — PROGRESS NOTES
[facility] [place] PULMONARY AND CRITICAL CARE PROGRESS NOTE - [facility]    Patient: Kolby Michel  1955   MR# [id number]   Acct# [id number]  03/16/24   07:05 CDT  Referring Provider: Андрей Ledesma MD    Chief Complaint: Shortness of breath    Interval history: Afebrile.  Saturation 96 on 4 L.  He is sitting up in the chair.  He reports feeling much better.  Exertional O2 need yesterday 6 L.  White count 16.6.  He is still on doxycycline.  Back on IV Solu-Medrol.  Will taper dose today.  Blood sugar 366.  He has some lower extremity edema.  He is receiving Lasix.  He believes that was helpful.  Echo reviewed.  Nothing significant other than a possible hemangioma in the liver.  Defer workup to primary.    Meds:  amLODIPine, 5 mg, Oral, Daily  doxycycline, 100 mg, Oral, Q12H  famotidine, 40 mg, Oral, Daily  furosemide, 40 mg, Intravenous, Daily  gabapentin, 600 mg, Oral, Q12H  heparin (porcine), 5,000 Units, Subcutaneous, Q12H  insulin lispro, 3-14 Units, Subcutaneous, 4x Daily AC & at Bedtime  methylPREDNISolone sodium succinate, 40 mg, Intravenous, Q6H  pioglitazone, 30 mg, Oral, Daily  rosuvastatin, 10 mg, Oral, Daily  sertraline, 50 mg, Oral, Daily  sodium chloride, 10 mL, Intravenous, Q12H           Physical Exam:  SpO2 Percentage    03/15/24 1933 03/15/24 2330 03/16/24 0430   SpO2: 93% 93% 96%     Body mass index is 40.13 kg/m².   Temp:  [97.5 °F (36.4 °C)-98.3 °F (36.8 °C)] 97.5 °F (36.4 °C)  Heart Rate:  [79-92] 79  Resp:  [18] 18  BP: (140-162)/(63-71) 147/71  Intake/Output Summary (Last 24 hours) at 3/16/2024 0705  Last data filed at 3/15/2024 1833  Gross per 24 hour   Intake 920 ml   Output --   Net 920 ml     Physical Exam  Constitutional:       Appearance: Normal appearance.      Interventions: Nasal cannula in place.      Comments: 4L   HENT:      Head: Normocephalic and atraumatic.   Cardiovascular:      Rate and Rhythm: Normal rate and regular rhythm.      Heart sounds: No murmur  heard.     No friction rub. No gallop.   Pulmonary:      Effort: Pulmonary effort is normal.      Breath sounds: Decreased breath sounds present.   Abdominal:      General: Bowel sounds are normal. There is no distension.      Palpations: Abdomen is soft.   Musculoskeletal:      Right lower leg: No edema.      Left lower leg: No edema.   Skin:     General: Skin is warm and dry.   Neurological:      General: No focal deficit present.      Mental Status: He is alert and oriented to person, place, and time.   Psychiatric:         Mood and Affect: Mood normal.         Behavior: Behavior normal.       Electronically signed by JUSTYNA Low, 3/16/2024, 07:05 CDT      Physician Substantive Portion: Medical Decision Making to follow:     Physician substantive portion: medical decision making  Result Review  Laboratory Data:  Results from last 7 days   Lab Units 03/16/24  0349 03/14/24  0426 03/13/24  0144   WBC 10*3/mm3 16.63* 13.15* 15.37*   HEMOGLOBIN g/dL 13.0 12.0* 12.4*   PLATELETS 10*3/mm3 247 176 158     Results from last 7 days   Lab Units 03/16/24  0349 03/14/24  0426 03/13/24  0144 03/12/24  0242 03/11/24  1909   SODIUM mmol/L 140 138 137   < >  --    POTASSIUM mmol/L 4.7 4.0 3.7   < >  --    CO2 mmol/L 34.0* 29.0 28.0   < >  --    BUN mg/dL 36* 29* 34*   < >  --    CREATININE mg/dL 1.03 0.96 1.08   < >  --    LACTATE mmol/L  --   --   --   --  0.9    < > = values in this interval not displayed.     Results from last 7 days   Lab Units 03/11/24  1907   PH, ARTERIAL pH units 7.426   PCO2, ARTERIAL mm Hg 44.6   PO2 ART mm Hg 70.0*     Microbiology Results (last 10 days)       Procedure Component Value - Date/Time    S. Pneumo Ag Urine or CSF - Urine, Urine, Clean Catch [[id number]]  (Normal) Collected: 03/12/24 1134    Lab Status: Final result Specimen: Urine, Clean Catch Updated: 03/12/24 1209     Strep Pneumo Ag Negative    Legionella Antigen, Urine - Urine, Urine, Clean Catch [[id number]]  (Normal)  Collected: 03/12/24 1134    Lab Status: Final result Specimen: Urine, Clean Catch Updated: 03/12/24 2120     LEGIONELLA ANTIGEN, URINE Negative    Respiratory Panel PCR w/COVID-19(SARS-CoV-2) JUDITH/SELENE/LYLE/PAD/COR/NEERU In-House, NP Swab in UTM/VTM, 2 HR TAT - Swab, Nasopharynx [[id number]]  (Normal) Collected: 03/12/24 0831    Lab Status: Final result Specimen: Swab from Nasopharynx Updated: 03/12/24 1007     ADENOVIRUS, PCR Not Detected     Coronavirus 229E Not Detected     Coronavirus HKU1 Not Detected     Coronavirus NL63 Not Detected     Coronavirus OC43 Not Detected     COVID19 Not Detected     Human Metapneumovirus Not Detected     Human Rhinovirus/Enterovirus Not Detected     Influenza A PCR Not Detected     Influenza B PCR Not Detected     Parainfluenza Virus 1 Not Detected     Parainfluenza Virus 2 Not Detected     Parainfluenza Virus 3 Not Detected     Parainfluenza Virus 4 Not Detected     RSV, PCR Not Detected     Bordetella pertussis pcr Not Detected     Bordetella parapertussis PCR Not Detected     Chlamydophila pneumoniae PCR Not Detected     Mycoplasma pneumo by PCR Not Detected    Narrative:      In the setting of a positive respiratory panel with a viral infection PLUS a negative procalcitonin without other underlying concern for bacterial infection, consider observing off antibiotics or discontinuation of antibiotics and continue supportive care. If the respiratory panel is positive for atypical bacterial infection (Bordetella pertussis, Chlamydophila pneumoniae, or Mycoplasma pneumoniae), consider antibiotic de-escalation to target atypical bacterial infection.    Blood Culture - Blood, Arm, Left [[id number]]  (Normal) Collected: 03/11/24 1909    Lab Status: Preliminary result Specimen: Blood from Arm, Left Updated: 03/15/24 1930     Blood Culture No growth at 4 days    Mycoplasma Pneumoniae Antibody, IgM - Blood, Arm, Left [[id number]] Collected: 03/11/24 1909    Lab Status: Final result Specimen:  Blood from Arm, Left Updated: 03/13/24 1513     M pneumoniae IgM Abs <770 U/mL      Comment:                              Negative            <770  Clinically significant amount of M. pneumoniae antibody  not detected.                               Low Positive   770 - 950  M. pneumoniae specific IgM presumptively detected.  It  is recommended that another sample be collected 1-2  weeks later to assure reactivity.                               Positive            >950  Highly significant amount of M. pneumoniae specific  IgM antibody detected.       Narrative:      Performed at:  [address]  [id number]  : Enrrique Ernandez PhD, Phone:  [phone]    Blood Culture - Blood, Arm, Right [[id number]]  (Normal) Collected: 03/11/24 1908    Lab Status: Preliminary result Specimen: Blood from Arm, Right Updated: 03/15/24 1930     Blood Culture No growth at 4 days           Recent films:  US Liver    Result Date: 3/16/2024  US LIVER- 3/16/2024 12:39 PM  REASON FOR EXAM: liver mass; R13.10-Dysphagia, unspecified; Z74.09-Other reduced mobility; J96.01-Acute respiratory failure with hypoxia   COMPARISON: NONE  TECHNIQUE: Multiple longitudinal and transverse realtime sonographic images of the right upper quadrant of the abdomen are obtained. Images were stored in PACS according to state institutional protocol  FINDINGS:  Pancreas: Visualized portions unremarkable..  Liver: 6.6 x 3.7 x 6.0 complex cystic lesion with possible solid component within the left hepatic lobe. 3.1 cm simple appearing right hepatic cyst.  Gallbladder: Cholelithiasis..  Bile ducts: The CBD measures 0.4 cm in diameter. There is no intrahepatic or extrahepatic ductal dilation.  Other: No ascites.      Impression:  6.6 cm complex cystic lesion within the left hepatic lobe with possible solid components. Recommend further evaluation with abdomen MR without and with contrast.  Cholelithiasis.  This report was  signed and finalized on 3/16/2024 2:35 PM by Chay Mcdonnell.      Adult Transthoracic Echo Complete W/ Cont if Necessary Per Protocol    Result Date: 3/15/2024    Left ventricular systolic function is hyperdynamic (EF > 70%). Left ventricular ejection fraction appears to be greater than 70%.   Left ventricular diastolic function was normal.   Estimated right ventricular systolic pressure from tricuspid regurgitation is normal (<35 mmHg).   Normal size and function of the right ventricle.   No significant valvular pathology.   There is an incidental finding of a lesion/mass in the liver.  This could be a cyst or potentially hemangioma.  It looks to be approximately 5 x 3 cm.  Recommend further imaging if clinically indicated.     XR Chest PA & Lateral    Result Date: 3/15/2024  EXAMINATION: XR CHEST PA AND LATERAL- 3/15/2024 2:00 PM  HISTORY: hypoxemia; R13.10-Dysphagia, unspecified; Z74.09-Other reduced mobility; J96.01-Acute respiratory failure with hypoxia.  REPORT: Frontal and lateral views of the chest were obtained.  COMPARISON: Chest x-rays 3/12/2024.  The lungs are grossly hypoaerated, with basilar atelectasis as before. No lobar consolidation is identified. There is central and basilar vascular congestion which is also unchanged and likely related to the low lung volumes. The heart margins are mostly obscured. No pneumothorax is identified. There is no definite pleural effusion. No acute osseous abnormality. There is mild to moderate dextroscoliosis of the midthoracic spine. The upper abdomen appears unremarkable.      Impression: Gross hypoaeration of the lungs with extensive basilar atelectasis as well as central and basilar vascular congestion. More mild lower lobe is not excluded with this appearance.  This report was signed and finalized on 3/15/2024 2:02 PM by Dr. Mundo Sigala MD.      Walking Oximetry    Result Date: 3/15/2024  Zainab Louise CRT     3/15/2024  9:16 AM Exercise Oximetry  Patient Name:Kolby Michel MRN: [id number] Date: 03/15/24         ROOM AIR BASELINE SpO2% 84 Heart Rate 91 Blood Pressure  EXERCISE ON ROOM AIR SpO2% EXERCISE ON O2 @ 2 LPM SpO2% 1 MINUTE  1 MINUTE                    3    84 2 MINUTES  2 MINUTES                  4    86 3 MINUTES  3 MINUTES                  6    92 4 MINUTES  4 MINUTES                  6    91 5 MINUTES  5 MINUTES                 6    93 6 MINUTES  6 MINUTES           Distance Walked   Distance Walked                100ft Dyspnea (Daisy Scale)   Dyspnea (Daisy Scale) Fatigue (Daisy Scale)   Fatigue (Daisy Scale) SpO2% Post Exercise   SpO2% Post Exercise        93 HR Post Exercise   HR Post Exercise               98 Time to Recovery   Time to Recovery Comments:  Patient dropped to 84% on room air. 2L was applied to patient, after a few steps had to increase to 4 L, SPO2 only increased to 86%. Increased to 6L, patient walked 100 ft and SPO2 remained at 91% or better. Patient will need 4L of oxygen at rest and 6L of oxygen when ambulating.     Personal review of imaging : CXR shows bilateral pulmonary infiltrate low lung volumes bibasilar atelectasis.      Pulmonary Assessment:  Severe acute on chronic respiratory failure  Dependent on supplemental oxygen  Congestive diastolic heart failure fluid overload  Bibasilar atelectasis  History of COVID infection  Obesity with possible sleep apnea      Recommend/plan:   He was seen in the follow-up visit in pulmonary rounds in medical floor today.  He is sitting up in the chair on 4L oxygen appears stable.  Needs 6 L oxygen on exertion.  He is normally on 3 to 4 L at home  On doxycycline white cell count stable.  Trace ankle edema noted  Lasix with good diuresis.  Monitor urine output and renal function electrolytes.  Incentive spirometry and flutter valve routine respiratory care and bronchodilator treatment.  Plan for outpatient PFT and sleep study and follow-up with Dr. Zamudio as outpatient after discharge.  DVT  and stress ulcer prophylaxis.  Pain and anxiety control.  PT/OT/nutritional support.  Repeat labs imaging studies from time to time.  C-Myc control  CODE STATUS: Full.  Overall prognosis: Guarded.  We will follow    Time spent by me: 35 min    This visit was performed by both a physician and an Advanced Practice RN.  I performed all aspects of the medical decision making as documented.    Electronically signed by     Jagruti Contreras MD,   Pulmonologist/Intensivist   3/16/2024, 16:36 CDT

## 2024-03-17 LAB
GLUCOSE BLDC GLUCOMTR-MCNC: 290 MG/DL (ref 70–130)
GLUCOSE BLDC GLUCOMTR-MCNC: 319 MG/DL (ref 70–130)
GLUCOSE BLDC GLUCOMTR-MCNC: 337 MG/DL (ref 70–130)
GLUCOSE BLDC GLUCOMTR-MCNC: 381 MG/DL (ref 70–130)
GLUCOSE BLDC GLUCOMTR-MCNC: 387 MG/DL (ref 70–130)
GLUCOSE BLDC GLUCOMTR-MCNC: 438 MG/DL (ref 70–130)

## 2024-03-17 PROCEDURE — 63710000001 INSULIN DETEMIR PER 5 UNITS: Performed by: INTERNAL MEDICINE

## 2024-03-17 PROCEDURE — 63710000001 INSULIN REGULAR HUMAN PER 5 UNITS: Performed by: INTERNAL MEDICINE

## 2024-03-17 PROCEDURE — 82948 REAGENT STRIP/BLOOD GLUCOSE: CPT

## 2024-03-17 PROCEDURE — 97116 GAIT TRAINING THERAPY: CPT

## 2024-03-17 PROCEDURE — 94799 UNLISTED PULMONARY SVC/PX: CPT

## 2024-03-17 PROCEDURE — 25010000002 METHYLPREDNISOLONE PER 40 MG: Performed by: NURSE PRACTITIONER

## 2024-03-17 PROCEDURE — 99233 SBSQ HOSP IP/OBS HIGH 50: CPT | Performed by: INTERNAL MEDICINE

## 2024-03-17 PROCEDURE — 25010000002 HEPARIN (PORCINE) PER 1000 UNITS: Performed by: FAMILY MEDICINE

## 2024-03-17 PROCEDURE — 25010000002 FUROSEMIDE PER 20 MG: Performed by: INTERNAL MEDICINE

## 2024-03-17 PROCEDURE — 63710000001 INSULIN LISPRO (HUMAN) PER 5 UNITS: Performed by: INTERNAL MEDICINE

## 2024-03-17 RX ORDER — PREDNISONE 20 MG/1
40 TABLET ORAL
Status: DISCONTINUED | OUTPATIENT
Start: 2024-03-17 | End: 2024-03-17

## 2024-03-17 RX ORDER — PREDNISONE 20 MG/1
40 TABLET ORAL
Status: DISCONTINUED | OUTPATIENT
Start: 2024-03-18 | End: 2024-03-18

## 2024-03-17 RX ADMIN — Medication 10 ML: at 09:43

## 2024-03-17 RX ADMIN — FAMOTIDINE 40 MG: 20 TABLET, FILM COATED ORAL at 09:43

## 2024-03-17 RX ADMIN — ROSUVASTATIN CALCIUM 10 MG: 10 TABLET, FILM COATED ORAL at 09:43

## 2024-03-17 RX ADMIN — INSULIN LISPRO 10 UNITS: 100 INJECTION, SOLUTION INTRAVENOUS; SUBCUTANEOUS at 09:40

## 2024-03-17 RX ADMIN — INSULIN LISPRO 14 UNITS: 100 INJECTION, SOLUTION INTRAVENOUS; SUBCUTANEOUS at 11:49

## 2024-03-17 RX ADMIN — SERTRALINE 50 MG: 50 TABLET, FILM COATED ORAL at 09:43

## 2024-03-17 RX ADMIN — INSULIN LISPRO 12 UNITS: 100 INJECTION, SOLUTION INTRAVENOUS; SUBCUTANEOUS at 21:18

## 2024-03-17 RX ADMIN — HEPARIN SODIUM 5000 UNITS: 5000 INJECTION INTRAVENOUS; SUBCUTANEOUS at 21:19

## 2024-03-17 RX ADMIN — GABAPENTIN 600 MG: 300 CAPSULE ORAL at 09:43

## 2024-03-17 RX ADMIN — INSULIN DETEMIR 10 UNITS: 100 INJECTION, SOLUTION SUBCUTANEOUS at 21:18

## 2024-03-17 RX ADMIN — HEPARIN SODIUM 5000 UNITS: 5000 INJECTION INTRAVENOUS; SUBCUTANEOUS at 09:40

## 2024-03-17 RX ADMIN — Medication 10 ML: at 21:19

## 2024-03-17 RX ADMIN — INSULIN DETEMIR 15 UNITS: 100 INJECTION, SOLUTION SUBCUTANEOUS at 09:40

## 2024-03-17 RX ADMIN — INSULIN HUMAN 10 UNITS: 100 INJECTION, SOLUTION PARENTERAL at 13:54

## 2024-03-17 RX ADMIN — METHYLPREDNISOLONE SODIUM SUCCINATE 40 MG: 40 INJECTION, POWDER, LYOPHILIZED, FOR SOLUTION INTRAMUSCULAR; INTRAVENOUS at 05:46

## 2024-03-17 RX ADMIN — FUROSEMIDE 40 MG: 10 INJECTION, SOLUTION INTRAMUSCULAR; INTRAVENOUS at 09:38

## 2024-03-17 RX ADMIN — PIOGLITAZONE 30 MG: 30 TABLET ORAL at 09:42

## 2024-03-17 RX ADMIN — AMLODIPINE BESYLATE 5 MG: 5 TABLET ORAL at 09:43

## 2024-03-17 RX ADMIN — INSULIN LISPRO 10 UNITS: 100 INJECTION, SOLUTION INTRAVENOUS; SUBCUTANEOUS at 16:52

## 2024-03-17 RX ADMIN — GABAPENTIN 600 MG: 300 CAPSULE ORAL at 21:19

## 2024-03-17 RX ADMIN — INSULIN HUMAN 15 UNITS: 100 INJECTION, SOLUTION PARENTERAL at 12:08

## 2024-03-17 NOTE — THERAPY TREATMENT NOTE
Acute Care - Physical Therapy Treatment Note  [facility]     Patient Name: Kolby Michel  : 1955  MRN: [id number]  Today's Date: 3/17/2024      Visit Dx:     ICD-10-CM ICD-9-CM   1. Dysphagia, unspecified type [R13.10]  R13.10 787.20   2. Impaired mobility [Z74.09]  Z74.09 799.89   3. Acute respiratory failure with hypoxemia  J96.01 518.81     Patient Active Problem List   Diagnosis    Acute respiratory failure with hypoxemia    Atypical pneumonia    DM (diabetes mellitus), type 2    Essential hypertension, benign    Obesity (BMI 30-39.9)    Pneumonia, unspecified organism    History of COVID-19    Orthopnea    Liver mass     Past Medical History:   Diagnosis Date    Diabetes mellitus     Hyperlipidemia     Hypertension      History reviewed. No pertinent surgical history.  PT Assessment (Last 12 Hours)       PT Evaluation and Treatment       Row Name 24 1033          Physical Therapy Time and Intention    Subjective Information no complaints  -LY     Document Type therapy note (daily note)  -LY     Mode of Treatment physical therapy  -LY       Row Name 24 1033          General Information    Existing Precautions/Restrictions fall;oxygen therapy device and L/min  4L  -LY       Row Name 24 1033          Pain    Pretreatment Pain Rating 0/10 - no pain  -LY     Posttreatment Pain Rating 0/10 - no pain  -LY       Row Name 24 1033          Gait/Stairs (Locomotion)    [place] Level (Gait) verbal cues;standby assist  -LY     Distance in Feet (Gait) 300  x2 reps  -LY     Pattern (Gait) swing-through  -LY     Deviations/Abnormal Patterns (Gait) stride length decreased  -LY     Comment, (Gait/Stairs) monitored O2, will drop to 87% briefly. Able to quickly recover rest breaks  -LY       Row Name 24 1033          Positioning and Restraints    Pre-Treatment Position sitting in chair/recliner  -LY     Post Treatment Position chair  -LY     In Chair reclined;call light within  reach;encouraged to call for assist  -LY               User Key  (r) = Recorded By, (t) = Taken By, (c) = Cosigned By      Initials Name Provider Type    LY Mary Beth Jones, PTA Physical Therapist Assistant                    Physical Therapy Education       Title: PT OT SLP Therapies (In Progress)       Topic: Physical Therapy (In Progress)       Point: Mobility training (In Progress)       Learning Progress Summary             Patient Acceptance, E, NR by BESSY at 3/15/2024 1543    Comment: energy conservation    Acceptance, E,D, NR by BESSY at 3/14/2024 1206    Comment: increasing Base of support    Acceptance, E, VU by ADELA at 3/12/2024 1152    Comment: stair negotiation safety with O2 demands, gait and long distance walks with proper breathing tecnhiques   Family Acceptance, E, VU by ADELA at 3/12/2024 1152    Comment: stair negotiation safety with O2 demands, gait and long distance walks with proper breathing tecnhiques                         Point: Precautions (In Progress)       Learning Progress Summary             Patient Acceptance, E,D, NR by BESSY at 3/13/2024 1239    Comment: purse lip breathing    Acceptance, E, VU by ADELA at 3/12/2024 1152    Comment: stair negotiation safety with O2 demands, gait and long distance walks with proper breathing tecnhiques   Family Acceptance, E, VU by ADELA at 3/12/2024 1152    Comment: stair negotiation safety with O2 demands, gait and long distance walks with proper breathing tecnhiques                                         User Key       Initials Effective Dates Name Provider Type Discipline    BESSY 02/03/23 -  Nicolasa Prabhakar PTA Physical Therapist Assistant PT    ADELA 02/22/24 -  Chay Mccollum PT Student PT Student PT                  PT Recommendation and Plan         Outcome Measures       Row Name 03/15/24 1500             How much help from another person do you currently need...    Turning from your back to your side while in flat bed without using bedrails? 4  -BESSY       Moving from lying on back to sitting on the side of a flat bed without bedrails? 4  -BESSY      Moving to and from a bed to a chair (including a wheelchair)? 4  -BESSY      Standing up from a chair using your arms (e.g., wheelchair, bedside chair)? 4  -BESSY      Climbing 3-5 steps with a railing? 4  -BESSY      To walk in hospital room? 4  -BESSY      AM-PAC 6 Clicks Score (PT) 24  -BESSY      Highest Level of Mobility Goal 8 --> Walked 250 feet or more  -BESSY                User Key  (r) = Recorded By, (t) = Taken By, (c) = Cosigned By      Initials Name Provider Type    BESSY Nicolasa Prabhakar PTA Physical Therapist Assistant                     Time Calculation:    PT Charges       Row Name 03/17/24 1426             Time Calculation    Start Time 1033  -LY      Stop Time 1100  -LY      Time Calculation (min) 27 min  -LY      PT Received On 03/17/24  -LY         Time Calculation- PT    Total Timed Code Minutes- PT 27 minute(s)  -LY         Timed Charges    33264 - Gait Training Minutes  27  -LY         Total Minutes    Timed Charges Total Minutes 27  -LY       Total Minutes 27  -LY                User Key  (r) = Recorded By, (t) = Taken By, (c) = Cosigned By      Initials Name Provider Type    LY Mary Beth Jones PTA Physical Therapist Assistant                  Therapy Charges for Today       Code Description Service Date Service Provider Modifiers Qty    [id number] HC GAIT TRAINING EA 15 MIN 3/17/2024 Mary Beth Jones PTA GP 2            PT G-Codes  Outcome Measure Options: AM-PAC 6 Clicks Basic Mobility (PT)  AM-PAC 6 Clicks Score (PT): 22    Mary Beth Jones PTA  3/17/2024

## 2024-03-17 NOTE — PLAN OF CARE
Goal Outcome Evaluation:              Outcome Evaluation: S 75-88 per tele. vss. 4L/NC O2. no c/o pain. will continue to monitor.

## 2024-03-17 NOTE — PLAN OF CARE
Goal Outcome Evaluation:              Outcome Evaluation: Conitnues on 4L/NC, walks around in room at times, good urine output after lasix, blood sugars elevated today, md aware additional insulin given, steroid frequency decreased, eating 100% meals, had shower today, to have CXR tomorrow

## 2024-03-17 NOTE — PROGRESS NOTES
[facility] Medicine Services  INPATIENT PROGRESS NOTE    Patient Name: Kolby Michel  Date of Admission: 3/11/2024  Today's Date: 03/17/24  Length of Stay: 6  Primary Care Physician: Marcos Figueroa MD    Subjective   Chief Complaint: Shortness of breath  HPI   Patient states that he is feeling okay, overall he is feeling better.  He is sitting up in the bedside chair.  He was interested to know the results of the liver ultrasound-we discussed.  We discussed an MRI of the abdomen, as recommended by radiology, typically takes 20-30 minutes to complete, and patient does feel like he is making progress regarding his ability to lay flat, but does not think he can do so for this extended period of time.  He voices no other new complaints.  Reports excellent diuresis after administration of Lasix.    Review of Systems   All pertinent negatives and positives are as above. All other systems have been reviewed and are negative unless otherwise stated.     Objective    Temp:  [97.4 °F (36.3 °C)-97.9 °F (36.6 °C)] 97.9 °F (36.6 °C)  Heart Rate:  [81-84] 81  Resp:  [16-18] 16  BP: (136-144)/(66-77) 142/67  Physical Exam  Vitals reviewed.   Constitutional:       Appearance: He is not ill-appearing or toxic-appearing.   HENT:      Head: Normocephalic.      Mouth/Throat:      Mouth: Mucous membranes are moist.   Eyes:      Pupils: Pupils are equal, round, and reactive to light.   Cardiovascular:      Rate and Rhythm: Normal rate.   Pulmonary:      Effort: Pulmonary effort is normal. No respiratory distress.      Comments: Diminished at bases; R>L; on supp 02 via nasal cannula  Musculoskeletal:      Right lower leg: Edema present.      Left lower leg: Edema present.   Skin:     General: Skin is warm.   Neurological:      General: No focal deficit present.      Mental Status: He is alert.   Psychiatric:         Mood and Affect: Mood normal.         Results Review:  I have reviewed the labs,  radiology results, and diagnostic studies.    Laboratory Data:   Results from last 7 days   Lab Units 03/16/24  0349 03/14/24  0426 03/13/24  0144   WBC 10*3/mm3 16.63* 13.15* 15.37*   HEMOGLOBIN g/dL 13.0 12.0* 12.4*   HEMATOCRIT % 40.8 37.3* 38.8   PLATELETS 10*3/mm3 247 176 158        Results from last 7 days   Lab Units 03/16/24  0349 03/14/24  0426 03/13/24  0144 03/12/24  0242   SODIUM mmol/L 140 138 137 135*   POTASSIUM mmol/L 4.7 4.0 3.7 3.9   CHLORIDE mmol/L 101 102 100 95*   CO2 mmol/L 34.0* 29.0 28.0 28.0   BUN mg/dL 36* 29* 34* 37*   CREATININE mg/dL 1.03 0.96 1.08 1.89*   CALCIUM mg/dL 9.5 8.7 8.6 8.7   BILIRUBIN mg/dL  --   --  0.5 0.8   ALK PHOS U/L  --   --  103 92   ALT (SGPT) U/L  --   --  22 22   AST (SGOT) U/L  --   --  20 21   GLUCOSE mg/dL 366* 169* 144* 154*       Culture Data:   Blood Culture   Date Value Ref Range Status   03/11/2024 No growth at 3 days  Preliminary   03/11/2024 No growth at 3 days  Preliminary       Radiology Data:   Imaging Results (Last 24 Hours)       Procedure Component Value Units Date/Time    US Liver [[id number]] Collected: 03/16/24 1430     Updated: 03/16/24 1438    Narrative:      US LIVER- 3/16/2024 12:39 PM     REASON FOR EXAM: liver mass; R13.10-Dysphagia, unspecified; Z74.09-Other  reduced mobility; J96.01-Acute respiratory failure with hypoxia       COMPARISON: NONE      TECHNIQUE: Multiple longitudinal and transverse realtime sonographic  images of the right upper quadrant of the abdomen are obtained. Images  were stored in PACS according to state institutional protocol     FINDINGS:    Pancreas: Visualized portions unremarkable..      Liver: 6.6 x 3.7 x 6.0 complex cystic lesion with possible solid  component within the left hepatic lobe. 3.1 cm simple appearing right  hepatic cyst.     Gallbladder: Cholelithiasis..      Bile ducts: The CBD measures 0.4 cm in diameter. There is no  intrahepatic or extrahepatic ductal dilation.      Other: No ascites.        Impression:         6.6 cm complex cystic lesion within the left hepatic lobe with possible  solid components. Recommend further evaluation with abdomen MR without  and with contrast.     Cholelithiasis.     This report was signed and finalized on 3/16/2024 2:35 PM by Chay Mcdonnell.               I have reviewed the patient's current medications.     Assessment/Plan   Assessment  Active Hospital Problems    Diagnosis     **Acute respiratory failure with hypoxemia     Liver mass     History of COVID-19     Orthopnea     Pneumonia, unspecified organism     Atypical pneumonia     DM (diabetes mellitus), type 2     Essential hypertension, benign     Obesity (BMI 30-39.9)        Treatment Plan  Pulmonary following and appreciate their assistance  Received another dose of IV Lasix this AM; will pause additional doses and reassess  BMP in AM  Echocardiogram results as follows:  Results for orders placed during the hospital encounter of 03/11/24    Adult Transthoracic Echo Complete W/ Cont if Necessary Per Protocol    Interpretation Summary    Left ventricular systolic function is hyperdynamic (EF > 70%). Left ventricular ejection fraction appears to be greater than 70%.    Left ventricular diastolic function was normal.    Estimated right ventricular systolic pressure from tricuspid regurgitation is normal (<35 mmHg).    Normal size and function of the right ventricle.    No significant valvular pathology.    There is an incidental finding of a lesion/mass in the liver.  This could be a cyst or potentially hemangioma.  It looks to be approximately 5 x 3 cm.  Recommend further imaging if clinically indicated.    4.   Liver US results noted; I doubt we will be able to get MRI Abdomen given difficulty patient has with lying flat even for a CT (MRI will take 20-30 min to complete)  5.  Wean 02 as tolerated  6.  Transition from IV Solumedrol to PO Prednisone today  7.   Walking oximetry study as follows: Patient dropped to  84% on room air. 2L was applied to patient, after a few steps had to increase to 4 L, SPO2 only increased to 86%. Increased to 6L, patient walked 100 ft and SPO2 remained at 91% or better. Patient will need 4L of oxygen at rest and 6L of oxygen when ambulating.   8.   PO Doxycycline (completed 3 days of Azithromycin and Ceftriaxone, then transitioned to Doxycyline) - completed 6 days of Abxs  9.   Incentive spirometry  10.   Hyperglycemia exacerbated by ongoing steroid administration.  Adjust SSI coverage. Adjust SQ insulin Levemir dosing today  11.  Encourage increase activity  12.  Dispo: anticipate home 1-2 days.  Patient followed by Dr. Mccann in the outpatient setting.  Discussed with patient plans for outpatient follow-up regarding liver lesion (unable to be flat for study at present)      Medical Decision Making  Number and Complexity of problems: 2 acute; high complexity      Conditions and Status        Condition is improving.     [facility] Data  External documents reviewed: none  Cardiac tracing (EKG, telemetry) interpretation: no new EKGs  Radiology interpretation: Liver US results reviewed  Labs reviewed: as above  Any tests that were considered but not ordered: CT Chest     Decision rules/scores evaluated (example [id number], Wells, etc): none     Discussed with: patient and spouse, Eugenia     Care Planning  Shared decision making: Discussed at length with patient with agreement to proceed with treatment plan as outlined  Code status and discussions: Full Code    Disposition  Social Determinants of Health that impact treatment or disposition: none  I expect the patient to be discharged to home in 1-2 days    Electronically signed by Андрей Ledesma MD, 03/17/24, 10:10 CDT.

## 2024-03-17 NOTE — PROGRESS NOTES
[facility] PULMONARY AND CRITICAL CARE PROGRESS NOTE - [facility]    Patient: Kolby Michel  1955   MR# [id number]   Acct# [id number]  03/17/24   06:53 CDT  Referring Provider: Андрей Ledesma MD    Chief Complaint: Shortness of breath    Interval history: Afebrile.  Sleeping upright in the chair.  Saturation 94 on 4 L.  Requiring 6 L with exertion.  Nursing at bedside to check blood sugar.  No follow-up imaging for review.  Steroids reduced yesterday.  He diuresed over 3 L.  No follow-up labs for review.  Ultrasound of the liver showing a possible cyst with solid component.  MRI of abdomen recommended, deferring to primary.  No new issues.    Meds:  amLODIPine, 5 mg, Oral, Daily  famotidine, 40 mg, Oral, Daily  furosemide, 40 mg, Intravenous, Daily  gabapentin, 600 mg, Oral, Q12H  heparin (porcine), 5,000 Units, Subcutaneous, Q12H  insulin detemir, 15 Units, Subcutaneous, Daily  insulin lispro, 3-14 Units, Subcutaneous, 4x Daily AC & at Bedtime  methylPREDNISolone sodium succinate, 40 mg, Intravenous, Q8H  pioglitazone, 30 mg, Oral, Daily  rosuvastatin, 10 mg, Oral, Daily  sertraline, 50 mg, Oral, Daily  sodium chloride, 10 mL, Intravenous, Q12H           Physical Exam:  SpO2 Percentage    03/16/24 1619 03/16/24 2033 03/17/24 0423   SpO2: 93% 92% 94%     Body mass index is 38.37 kg/m².   Temp:  [97.4 °F (36.3 °C)-97.8 °F (36.6 °C)] 97.4 °F (36.3 °C)  Heart Rate:  [81-84] 84  Resp:  [16-18] 16  BP: (135-144)/(66-77) 144/72  Intake/Output Summary (Last 24 hours) at 3/17/2024 0653  Last data filed at 3/17/2024 0423  Gross per 24 hour   Intake 235 ml   Output 3750 ml   Net -3515 ml     Physical Exam  Constitutional:       Appearance: Normal appearance.      Interventions: Nasal cannula in place.      Comments: 4L   HENT:      Head: Normocephalic and atraumatic.   Cardiovascular:      Rate and Rhythm: Normal rate and regular rhythm.      Heart sounds: No murmur heard.     No friction rub. No  gallop.   Pulmonary:      Effort: Pulmonary effort is normal.      Breath sounds: Decreased breath sounds present.   Abdominal:      General: Bowel sounds are normal. There is no distension.      Palpations: Abdomen is soft.   Musculoskeletal:      Right lower leg: No edema.      Left lower leg: No edema.   Skin:     General: Skin is warm and dry.   Neurological:      General: No focal deficit present.      Mental Status: He is alert and oriented to person, place, and time.   Psychiatric:         Mood and Affect: Mood normal.         Behavior: Behavior normal.       Electronically signed by JUSTYNA Low, 3/17/2024, 06:53 CDT      Physician Substantive Portion: Medical Decision Making to follow:      Physician substantive portion: medical decision making  Result Review  Laboratory Data:  Results from last 7 days   Lab Units 03/16/24 0349 03/14/24  0426 03/13/24  0144   WBC 10*3/mm3 16.63* 13.15* 15.37*   HEMOGLOBIN g/dL 13.0 12.0* 12.4*   PLATELETS 10*3/mm3 247 176 158     Results from last 7 days   Lab Units 03/16/24  0349 03/14/24  0426 03/13/24  0144 03/12/24  0242 03/11/24  1909   SODIUM mmol/L 140 138 137   < >  --    POTASSIUM mmol/L 4.7 4.0 3.7   < >  --    CO2 mmol/L 34.0* 29.0 28.0   < >  --    BUN mg/dL 36* 29* 34*   < >  --    CREATININE mg/dL 1.03 0.96 1.08   < >  --    LACTATE mmol/L  --   --   --   --  0.9    < > = values in this interval not displayed.     Results from last 7 days   Lab Units 03/11/24  1907   PH, ARTERIAL pH units 7.426   PCO2, ARTERIAL mm Hg 44.6   PO2 ART mm Hg 70.0*     Microbiology Results (last 10 days)       Procedure Component Value - Date/Time    S. Pneumo Ag Urine or CSF - Urine, Urine, Clean Catch [[id number]]  (Normal) Collected: 03/12/24 1134    Lab Status: Final result Specimen: Urine, Clean Catch Updated: 03/12/24 1209     Strep Pneumo Ag Negative    Legionella Antigen, Urine - Urine, Urine, Clean Catch [[id number]]  (Normal) Collected: 03/12/24 1134    Lab  Status: Final result Specimen: Urine, Clean Catch Updated: 03/12/24 2120     LEGIONELLA ANTIGEN, URINE Negative    Respiratory Panel PCR w/COVID-19(SARS-CoV-2) JUDITH/SELENE/LYLE/PAD/COR/NEERU In-House, NP Swab in UTM/VTM, 2 HR TAT - Swab, Nasopharynx [[id number]]  (Normal) Collected: 03/12/24 0831    Lab Status: Final result Specimen: Swab from Nasopharynx Updated: 03/12/24 1007     ADENOVIRUS, PCR Not Detected     Coronavirus 229E Not Detected     Coronavirus HKU1 Not Detected     Coronavirus NL63 Not Detected     Coronavirus OC43 Not Detected     COVID19 Not Detected     Human Metapneumovirus Not Detected     Human Rhinovirus/Enterovirus Not Detected     Influenza A PCR Not Detected     Influenza B PCR Not Detected     Parainfluenza Virus 1 Not Detected     Parainfluenza Virus 2 Not Detected     Parainfluenza Virus 3 Not Detected     Parainfluenza Virus 4 Not Detected     RSV, PCR Not Detected     Bordetella pertussis pcr Not Detected     Bordetella parapertussis PCR Not Detected     Chlamydophila pneumoniae PCR Not Detected     Mycoplasma pneumo by PCR Not Detected    Narrative:      In the setting of a positive respiratory panel with a viral infection PLUS a negative procalcitonin without other underlying concern for bacterial infection, consider observing off antibiotics or discontinuation of antibiotics and continue supportive care. If the respiratory panel is positive for atypical bacterial infection (Bordetella pertussis, Chlamydophila pneumoniae, or Mycoplasma pneumoniae), consider antibiotic de-escalation to target atypical bacterial infection.    Blood Culture - Blood, Arm, Left [[id number]]  (Normal) Collected: 03/11/24 1909    Lab Status: Final result Specimen: Blood from Arm, Left Updated: 03/16/24 1931     Blood Culture No growth at 5 days    Mycoplasma Pneumoniae Antibody, IgM - Blood, Arm, Left [[id number]] Collected: 03/11/24 1909    Lab Status: Final result Specimen: Blood from Arm, Left Updated: 03/13/24  1513     M pneumoniae IgM Abs <770 U/mL      Comment:                              Negative            <770  Clinically significant amount of M. pneumoniae antibody  not detected.                               Low Positive   770 - 950  M. pneumoniae specific IgM presumptively detected.  It  is recommended that another sample be collected 1-2  weeks later to assure reactivity.                               Positive            >950  Highly significant amount of M. pneumoniae specific  IgM antibody detected.       Narrative:      Performed at:  [address] [place] [address]  [id number]  : Enrrique Ernandez PhD, Phone:  [phone]    Blood Culture - Blood, Arm, Right [[id number]]  (Normal) Collected: 03/11/24 1908    Lab Status: Final result Specimen: Blood from Arm, Right Updated: 03/16/24 1931     Blood Culture No growth at 5 days           Recent films:  US Liver    Result Date: 3/16/2024  US LIVER- 3/16/2024 12:39 PM  REASON FOR EXAM: liver mass; R13.10-Dysphagia, unspecified; Z74.09-Other reduced mobility; J96.01-Acute respiratory failure with hypoxia   COMPARISON: NONE  TECHNIQUE: Multiple longitudinal and transverse realtime sonographic images of the right upper quadrant of the abdomen are obtained. Images were stored in PACS according to state institutional protocol  FINDINGS:  Pancreas: Visualized portions unremarkable..  Liver: 6.6 x 3.7 x 6.0 complex cystic lesion with possible solid component within the left hepatic lobe. 3.1 cm simple appearing right hepatic cyst.  Gallbladder: Cholelithiasis..  Bile ducts: The CBD measures 0.4 cm in diameter. There is no intrahepatic or extrahepatic ductal dilation.  Other: No ascites.      Impression:  6.6 cm complex cystic lesion within the left hepatic lobe with possible solid components. Recommend further evaluation with abdomen MR without and with contrast.  Cholelithiasis.  This report was signed and finalized on 3/16/2024 2:35 PM by  Chay Mcdonnell.      Adult Transthoracic Echo Complete W/ Cont if Necessary Per Protocol    Result Date: 3/15/2024    Left ventricular systolic function is hyperdynamic (EF > 70%). Left ventricular ejection fraction appears to be greater than 70%.   Left ventricular diastolic function was normal.   Estimated right ventricular systolic pressure from tricuspid regurgitation is normal (<35 mmHg).   Normal size and function of the right ventricle.   No significant valvular pathology.   There is an incidental finding of a lesion/mass in the liver.  This could be a cyst or potentially hemangioma.  It looks to be approximately 5 x 3 cm.  Recommend further imaging if clinically indicated.     Personal review of imaging : CXR shows last chest x-ray done on 3/15 s/2024 howed low lung volumes with vascular congestion  Repeat chest x-ray ordered for tomorrow morning    Pulmonary Assessment:    Severe acute on chronic respiratory failure  Dependent on supplemental oxygen  Congestive diastolic heart failure fluid overload  Bibasilar atelectasis  History of COVID infection  Obesity with possible sleep apnea    Recommend/plan:   He was seen in the follow-up visit in pulmonary rounds in medical floor today.  He is sitting up in the chair on 4L oxygen appears stable.  His wife was at bedside.  He needs 5 to 6 L on exertion.  Patient told me he was not on oxygen at home  On doxycycline white cell count stable.  Trace ankle edema noted  Lasix with good diuresis.  Monitor urine output and renal function electrolytes.  Incentive spirometry and flutter valve.  Routine respiratory care and bronchodilator treatment.  Plan for outpatient PFT and sleep study and follow-up with Dr. Zamudio as outpatient after discharge.  DVT and stress ulcer prophylaxis.  Pain and anxiety control.  PT/OT/nutritional support.  Repeat labs imaging studies from time to time.  Glycemic control.  Chest x-ray ordered tomorrow morning  Most likely he will be discharged  tomorrow or day after tomorrow.  Getting some workup for the liver cyst  CODE STATUS: Full.  Overall prognosis: Guarded.  We will follow    Time spent by me: 35 min    This visit was performed by both a physician and an [facility] RN.  I performed all aspects of the medical decision making as documented.    Electronically signed by     Jagruti Contreras MD,  Pulmonologist/Intensivist   3/17/2024, 14:33 CDT

## 2024-03-18 ENCOUNTER — TELEPHONE (OUTPATIENT)
Dept: FAMILY MEDICINE CLINIC | Age: 69
End: 2024-03-18

## 2024-03-18 ENCOUNTER — APPOINTMENT (OUTPATIENT)
Dept: GENERAL RADIOLOGY | Facility: HOSPITAL | Age: 69
DRG: 189 | End: 2024-03-18
Payer: MEDICARE

## 2024-03-18 LAB
ANION GAP SERPL CALCULATED.3IONS-SCNC: 6 MMOL/L (ref 5–15)
BUN SERPL-MCNC: 35 MG/DL (ref 8–23)
BUN/CREAT SERPL: 39.8 (ref 7–25)
CALCIUM SPEC-SCNC: 9.1 MG/DL (ref 8.6–10.5)
CHLORIDE SERPL-SCNC: 97 MMOL/L (ref 98–107)
CO2 SERPL-SCNC: 37 MMOL/L (ref 22–29)
CREAT SERPL-MCNC: 0.88 MG/DL (ref 0.76–1.27)
EGFRCR SERPLBLD CKD-EPI 2021: 92.5 ML/MIN/1.73
GLUCOSE BLDC GLUCOMTR-MCNC: 277 MG/DL (ref 70–130)
GLUCOSE BLDC GLUCOMTR-MCNC: 317 MG/DL (ref 70–130)
GLUCOSE BLDC GLUCOMTR-MCNC: 392 MG/DL (ref 70–130)
GLUCOSE BLDC GLUCOMTR-MCNC: 489 MG/DL (ref 70–130)
GLUCOSE BLDC GLUCOMTR-MCNC: 494 MG/DL (ref 70–130)
GLUCOSE SERPL-MCNC: 317 MG/DL (ref 65–99)
POTASSIUM SERPL-SCNC: 3.9 MMOL/L (ref 3.5–5.2)
SODIUM SERPL-SCNC: 140 MMOL/L (ref 136–145)

## 2024-03-18 PROCEDURE — 82948 REAGENT STRIP/BLOOD GLUCOSE: CPT

## 2024-03-18 PROCEDURE — 80048 BASIC METABOLIC PNL TOTAL CA: CPT | Performed by: INTERNAL MEDICINE

## 2024-03-18 PROCEDURE — 63710000001 INSULIN LISPRO (HUMAN) PER 5 UNITS: Performed by: INTERNAL MEDICINE

## 2024-03-18 PROCEDURE — 25010000002 HEPARIN (PORCINE) PER 1000 UNITS: Performed by: FAMILY MEDICINE

## 2024-03-18 PROCEDURE — 97116 GAIT TRAINING THERAPY: CPT

## 2024-03-18 PROCEDURE — 63710000001 INSULIN DETEMIR PER 5 UNITS: Performed by: INTERNAL MEDICINE

## 2024-03-18 PROCEDURE — 63710000001 INSULIN REGULAR HUMAN PER 5 UNITS: Performed by: INTERNAL MEDICINE

## 2024-03-18 PROCEDURE — 71045 X-RAY EXAM CHEST 1 VIEW: CPT

## 2024-03-18 PROCEDURE — 63710000001 PREDNISONE PER 1 MG: Performed by: INTERNAL MEDICINE

## 2024-03-18 PROCEDURE — 99233 SBSQ HOSP IP/OBS HIGH 50: CPT | Performed by: INTERNAL MEDICINE

## 2024-03-18 RX ORDER — FUROSEMIDE 40 MG/1
40 TABLET ORAL DAILY
Status: DISCONTINUED | OUTPATIENT
Start: 2024-03-18 | End: 2024-03-19 | Stop reason: HOSPADM

## 2024-03-18 RX ORDER — PREDNISONE 20 MG/1
20 TABLET ORAL
Status: DISCONTINUED | OUTPATIENT
Start: 2024-03-19 | End: 2024-03-19 | Stop reason: HOSPADM

## 2024-03-18 RX ORDER — POTASSIUM CHLORIDE 750 MG/1
20 CAPSULE, EXTENDED RELEASE ORAL ONCE
Status: COMPLETED | OUTPATIENT
Start: 2024-03-18 | End: 2024-03-18

## 2024-03-18 RX ADMIN — AMLODIPINE BESYLATE 5 MG: 5 TABLET ORAL at 09:33

## 2024-03-18 RX ADMIN — HEPARIN SODIUM 5000 UNITS: 5000 INJECTION INTRAVENOUS; SUBCUTANEOUS at 09:31

## 2024-03-18 RX ADMIN — INSULIN HUMAN 15 UNITS: 100 INJECTION, SOLUTION PARENTERAL at 18:41

## 2024-03-18 RX ADMIN — INSULIN HUMAN 20 UNITS: 100 INJECTION, SOLUTION PARENTERAL at 17:08

## 2024-03-18 RX ADMIN — POTASSIUM CHLORIDE 20 MEQ: 10 CAPSULE, COATED, EXTENDED RELEASE ORAL at 11:06

## 2024-03-18 RX ADMIN — GABAPENTIN 600 MG: 300 CAPSULE ORAL at 09:31

## 2024-03-18 RX ADMIN — INSULIN LISPRO 8 UNITS: 100 INJECTION, SOLUTION INTRAVENOUS; SUBCUTANEOUS at 09:31

## 2024-03-18 RX ADMIN — Medication 10 ML: at 20:57

## 2024-03-18 RX ADMIN — GABAPENTIN 600 MG: 300 CAPSULE ORAL at 20:57

## 2024-03-18 RX ADMIN — HEPARIN SODIUM 5000 UNITS: 5000 INJECTION INTRAVENOUS; SUBCUTANEOUS at 20:56

## 2024-03-18 RX ADMIN — ROSUVASTATIN CALCIUM 10 MG: 10 TABLET, FILM COATED ORAL at 09:34

## 2024-03-18 RX ADMIN — FAMOTIDINE 40 MG: 20 TABLET, FILM COATED ORAL at 09:33

## 2024-03-18 RX ADMIN — PIOGLITAZONE 30 MG: 30 TABLET ORAL at 09:34

## 2024-03-18 RX ADMIN — INSULIN LISPRO 10 UNITS: 100 INJECTION, SOLUTION INTRAVENOUS; SUBCUTANEOUS at 12:54

## 2024-03-18 RX ADMIN — SERTRALINE 50 MG: 50 TABLET, FILM COATED ORAL at 09:34

## 2024-03-18 RX ADMIN — Medication 10 ML: at 09:34

## 2024-03-18 RX ADMIN — INSULIN DETEMIR 10 UNITS: 100 INJECTION, SOLUTION SUBCUTANEOUS at 09:31

## 2024-03-18 RX ADMIN — FUROSEMIDE 40 MG: 40 TABLET ORAL at 11:06

## 2024-03-18 RX ADMIN — INSULIN LISPRO 14 UNITS: 100 INJECTION, SOLUTION INTRAVENOUS; SUBCUTANEOUS at 16:59

## 2024-03-18 RX ADMIN — PREDNISONE 40 MG: 20 TABLET ORAL at 09:33

## 2024-03-18 RX ADMIN — INSULIN DETEMIR 15 UNITS: 100 INJECTION, SOLUTION SUBCUTANEOUS at 20:56

## 2024-03-18 RX ADMIN — INSULIN LISPRO 12 UNITS: 100 INJECTION, SOLUTION INTRAVENOUS; SUBCUTANEOUS at 20:56

## 2024-03-18 NOTE — THERAPY TREATMENT NOTE
Acute Care - Physical Therapy Treatment Note  [facility]     Patient Name: Kolby Michel  : 1955  MRN: [id number]  Today's Date: 3/18/2024      Visit Dx:     ICD-10-CM ICD-9-CM   1. Dysphagia, unspecified type [R13.10]  R13.10 787.20   2. Impaired mobility [Z74.09]  Z74.09 799.89   3. Acute respiratory failure with hypoxemia  J96.01 518.81     Patient Active Problem List   Diagnosis    Acute respiratory failure with hypoxemia    Atypical pneumonia    DM (diabetes mellitus), type 2    Essential hypertension, benign    Obesity (BMI 30-39.9)    Pneumonia, unspecified organism    History of COVID-19    Orthopnea    Liver mass     Past Medical History:   Diagnosis Date    Diabetes mellitus     Hyperlipidemia     Hypertension      History reviewed. No pertinent surgical history.  PT Assessment (Last 12 Hours)       PT Evaluation and Treatment       Row Name 24 1031          Physical Therapy Time and Intention    Subjective Information no complaints  -     Document Type therapy note (daily note)  -     Mode of Treatment physical therapy  -       Row Name 24 1031          General Information    Existing Precautions/Restrictions fall;oxygen therapy device and L/min  3L at rest, 4L with activity  -       Row Name 24 1031          Pain    Pretreatment Pain Rating 0/10 - no pain  -     Posttreatment Pain Rating 0/10 - no pain  -[facility] Name 24 1031          Bed Mobility    Comment, (Bed Mobility) chair, pt stays in recliner here and at home  -       Row Name 24 1031          Transfers    Transfers toilet transfer  -       Row Name 24 1031          Sit-Stand Transfer    Sit-Stand [place] (Transfers) supervision  -       Row Name 24 1031          Stand-Sit Transfer    Stand-Sit [place] (Transfers) supervision  -       Row Name 24 1031          Toilet Transfer    Type (Toilet Transfer) sit-stand;stand-sit  -     [place] Level (Toilet  Transfer) supervision  -LEXII     Assistive Device (Toilet Transfer) commode  -LEXII       Row Name 03/18/24 1031          Gait/Stairs (Locomotion)    [place]o Level (Gait) standby assist  -LEXII     Distance in Feet (Gait) 300  x2 with standing rest  -LEXII       Row Name 03/18/24 1031          Positioning and Restraints    Pre-Treatment Position sitting in chair/recliner  -LEXII     Post Treatment Position chair  -LEXII     In Chair sitting;call light within reach;encouraged to call for assist;with family/caregiver  -LEXII               User Key  (r) = Recorded By, (t) = Taken By, (c) = Cosigned By      Initials Name Provider Type    Victorino Du, PTA Physical Therapist Assistant                    Physical Therapy Education       Title: PT OT SLP Therapies (In Progress)       Topic: Physical Therapy (In Progress)       Point: Mobility training (In Progress)       Learning Progress Summary             Patient Acceptance, E, NR by BESSY at 3/15/2024 1543    Comment: energy conservation    Acceptance, E,D, NR by BESSY at 3/14/2024 1206    Comment: increasing Base of support    Acceptance, E, VU by ADELA at 3/12/2024 1152    Comment: stair negotiation safety with O2 demands, gait and long distance walks with proper breathing tecnhiques   Family Acceptance, E, VU by ADELA at 3/12/2024 1152    Comment: stair negotiation safety with O2 demands, gait and long distance walks with proper breathing tecnhiques                         Point: Precautions (In Progress)       Learning Progress Summary             Patient Acceptance, E,D, NR by BESSY at 3/13/2024 1239    Comment: purse lip breathing    Acceptance, E, VU by ADELA at 3/12/2024 1152    Comment: stair negotiation safety with O2 demands, gait and long distance walks with proper breathing tecnhiques   Family Acceptance, E, VU by ADELA at 3/12/2024 1152    Comment: stair negotiation safety with O2 demands, gait and long distance walks with proper breathing tecnhiques                                          User Key       Initials Effective Dates Name Provider Type Discipline     02/03/23 -  Nicolasa Prabhakar PTA Physical Therapist Assistant PT    AJ 02/22/24 -  Chay Mccollum, OTF Student PT Student PT                  PT Recommendation and Plan     Plan of Care Reviewed With: patient  Progress: improving  Outcome Evaluation: Pt was up in the chair on 3L at 93%, placed on 4L for activity.  Transfered sit to stand with supervision.  Amb 300' x 2 with standing rest and SBA, O2 decreased to 86%.  Wtih rest pt recovered back to the 90's and was placed back on 3L   Outcome Measures       Row Name 03/18/24 1031 03/15/24 1500          How much help from another person do you currently need...    Turning from your back to your side while in flat bed without using bedrails? 4  -LEXII 4  -BESSY     Moving from lying on back to sitting on the side of a flat bed without bedrails? 4  -LEXII 4  -BESSY     Moving to and from a bed to a chair (including a wheelchair)? 4  -LEXII 4  -BESSY     Standing up from a chair using your arms (e.g., wheelchair, bedside chair)? 4  -LEXII 4  -BESSY     Climbing 3-5 steps with a railing? 3  -LEXII 4  -BESSY     To walk in hospital room? 4  -LEXII 4  -BESSY     AM-PAC 6 Clicks Score (PT) 23  -LEXII 24  -BESSY     Highest Level of Mobility Goal 7 --> Walk 25 feet or more  -LEXII 8 --> Walked 250 feet or more  -BESSY        Functional Assessment    Outcome Measure Options AM-PAC 6 Clicks Basic Mobility (PT)  -LEXII --               User Key  (r) = Recorded By, (t) = Taken By, (c) = Cosigned By      Initials Name Provider Type    BESSY Nicolasa Prabhakar, ROMEL Physical Therapist Assistant    Victorino Du, ROMEL Physical Therapist Assistant                     Time Calculation:    PT Charges       Row Name 03/18/24 1031             Time Calculation    Start Time 1031  -LEXII      Stop Time 1055  -LEXII      Time Calculation (min) 24 min  -LEXII      PT Received On 03/18/24  -LEXII         Time Calculation- PT    Total Timed Code Minutes- PT 24 minute(s)   -LEXII         Timed Charges    23558 - Gait Training Minutes  24  -LEXII         Total Minutes    Timed Charges Total Minutes 24  -LEXII       Total Minutes 24  -LEXII                User Key  (r) = Recorded By, (t) = Taken By, (c) = Cosigned By      Initials Name Provider Type    Victorino Du PTA Physical Therapist Assistant                  Therapy Charges for Today       Code Description Service Date Service Provider Modifiers Qty    16297262584 HC GAIT TRAINING EA 15 MIN 3/18/2024 Victorino Guillaume PTA GP 2            PT G-Codes  Outcome Measure Options: AM-PAC 6 Clicks Basic Mobility (PT)  AM-PAC 6 Clicks Score (PT): 23    Victorino Guillaume PTA  3/18/2024

## 2024-03-18 NOTE — TELEPHONE ENCOUNTER
Care Transitions Initial Follow Up Call    Outreach made within 2 business days of discharge: Yes    Patient: Sae Wilson Patient : 1955   MRN: [id number]  Reason for Admission: There are no discharge diagnoses documented for the most recent discharge.  Discharge Date: 3/14/24       Spoke with: no answer and VM not set up    Discharge department/facility: [facility]        Scheduled appointment with PCP within 7-14 days    Follow Up  Future Appointments   Date Time Provider Department Center   3/21/2024 11:00 AM Nitesh Lawson MD [facility]       Abby Holbrook MA

## 2024-03-18 NOTE — PROGRESS NOTES
[facility] [place] - [facility]    Patient: Kolby Michel  1955   MR# [id number]   Acct# [id number]  03/18/24   08:08 CDT  Referring Provider: Андрей Ledesma MD    Chief Complaint: Shortness of breath  Interval history: He is seen awake up in the chair.  Oxygen saturations stable on 4 L nasal cannula.  He tells me he has been set up for home oxygen.  Chest film from this morning reviewed and stable showing persistent bilateral infiltrates and atelectasis.  Prednisone 40 daily continues.  Afebrile.  No acute issues reported overnight.    Has follow-up with Karly Weeks 3-  Meds:  amLODIPine, 5 mg, Oral, Daily  famotidine, 40 mg, Oral, Daily  gabapentin, 600 mg, Oral, Q12H  heparin (porcine), 5,000 Units, Subcutaneous, Q12H  insulin detemir, 10 Units, Subcutaneous, Q12H  insulin lispro, 3-14 Units, Subcutaneous, 4x Daily AC & at Bedtime  pioglitazone, 30 mg, Oral, Daily  predniSONE, 40 mg, Oral, Daily With Breakfast  rosuvastatin, 10 mg, Oral, Daily  sertraline, 50 mg, Oral, Daily  sodium chloride, 10 mL, Intravenous, Q12H           Physical Exam:  SpO2 Percentage    03/17/24 2118 03/18/24 0432 03/18/24 0807   SpO2: 93% 96% 98%     Body mass index is 39.13 kg/m².   Temp:  [97.4 °F (36.3 °C)-98.2 °F (36.8 °C)] 98.2 °F (36.8 °C)  Heart Rate:  [79-90] 82  Resp:  [16-18] 18  BP: (126-149)/(63-79) 136/67  Intake/Output Summary (Last 24 hours) at 3/18/2024 0808  Last data filed at 3/18/2024 0432  Gross per 24 hour   Intake 640 ml   Output 3550 ml   Net -2910 ml     Physical Exam  Constitutional:       General: He is not in acute distress.     Interventions: Nasal cannula in place.      Comments: 4l   HENT:      Head: Normocephalic.      Nose: Nose normal.      Mouth/Throat:      Mouth: Mucous membranes are moist.   Eyes:      General: No scleral icterus.  Cardiovascular:      Rate and Rhythm: Normal rate.   Pulmonary:      Effort: No respiratory distress.       Breath sounds: Decreased breath sounds present.   Abdominal:      General: There is no distension.   Musculoskeletal:      Right lower leg: No edema.      Left lower leg: No edema.   Neurological:      Mental Status: He is alert and oriented to person, place, and time. Mental status is at baseline.   Psychiatric:         Mood and Affect: Mood normal.         Behavior: Behavior is cooperative.         Electronically signed by JUSTYNA Gracia, 3/18/2024, 08:08 CDT      Physician substantive portion: medical decision making  Result Review  Laboratory Data:  Results from last 7 days   Lab Units 03/16/24  0349 03/14/24  0426 03/13/24  0144   WBC 10*3/mm3 16.63* 13.15* 15.37*   HEMOGLOBIN g/dL 13.0 12.0* 12.4*   PLATELETS 10*3/mm3 247 176 158     Results from last 7 days   Lab Units 03/18/24  0430 03/16/24  0349 03/14/24  0426 03/12/24  0242 03/11/24  1909   SODIUM mmol/L 140 140 138   < >  --    POTASSIUM mmol/L 3.9 4.7 4.0   < >  --    CO2 mmol/L 37.0* 34.0* 29.0   < >  --    BUN mg/dL 35* 36* 29*   < >  --    CREATININE mg/dL 0.88 1.03 0.96   < >  --    LACTATE mmol/L  --   --   --   --  0.9    < > = values in this interval not displayed.     Results from last 7 days   Lab Units 03/11/24  1907   PH, ARTERIAL pH units 7.426   PCO2, ARTERIAL mm Hg 44.6   PO2 ART mm Hg 70.0*     Microbiology Results (last 10 days)       Procedure Component Value - Date/Time    S. Pneumo Ag Urine or CSF - Urine, Urine, Clean Catch [[id number]]  (Normal) Collected: 03/12/24 1134    Lab Status: Final result Specimen: Urine, Clean Catch Updated: 03/12/24 1209     Strep Pneumo Ag Negative    Legionella Antigen, Urine - Urine, Urine, Clean Catch [[id number]]  (Normal) Collected: 03/12/24 1134    Lab Status: Final result Specimen: Urine, Clean Catch Updated: 03/12/24 2120     LEGIONELLA ANTIGEN, URINE Negative    Respiratory Panel PCR w/COVID-19(SARS-CoV-2) JUDITH/SELENE/LYLE/PAD/COR/NEERU In-House, NP Swab in UTM/VTM, 2 HR TAT - Swab, Nasopharynx  [[id number]]  (Normal) Collected: 03/12/24 0831    Lab Status: Final result Specimen: Swab from Nasopharynx Updated: 03/12/24 1007     ADENOVIRUS, PCR Not Detected     Coronavirus 229E Not Detected     Coronavirus HKU1 Not Detected     Coronavirus NL63 Not Detected     Coronavirus OC43 Not Detected     COVID19 Not Detected     Human Metapneumovirus Not Detected     Human Rhinovirus/Enterovirus Not Detected     Influenza A PCR Not Detected     Influenza B PCR Not Detected     Parainfluenza Virus 1 Not Detected     Parainfluenza Virus 2 Not Detected     Parainfluenza Virus 3 Not Detected     Parainfluenza Virus 4 Not Detected     RSV, PCR Not Detected     Bordetella pertussis pcr Not Detected     Bordetella parapertussis PCR Not Detected     Chlamydophila pneumoniae PCR Not Detected     Mycoplasma pneumo by PCR Not Detected    Narrative:      In the setting of a positive respiratory panel with a viral infection PLUS a negative procalcitonin without other underlying concern for bacterial infection, consider observing off antibiotics or discontinuation of antibiotics and continue supportive care. If the respiratory panel is positive for atypical bacterial infection (Bordetella pertussis, Chlamydophila pneumoniae, or Mycoplasma pneumoniae), consider antibiotic de-escalation to target atypical bacterial infection.    Blood Culture - Blood, Arm, Left [[id number]]  (Normal) Collected: 03/11/24 1909    Lab Status: Final result Specimen: Blood from Arm, Left Updated: 03/16/24 1931     Blood Culture No growth at 5 days    Mycoplasma Pneumoniae Antibody, IgM - Blood, Arm, Left [[id number]] Collected: 03/11/24 1909    Lab Status: Final result Specimen: Blood from Arm, Left Updated: 03/13/24 1513     M pneumoniae IgM Abs <770 U/mL      Comment:                              Negative            <770  Clinically significant amount of M. pneumoniae antibody  not detected.                               Low Positive   770 - 950  M.  pneumoniae specific IgM presumptively detected.  It  is recommended that another sample be collected 1-2  weeks later to assure reactivity.                               Positive            >950  Highly significant amount of M. pneumoniae specific  IgM antibody detected.       Narrative:      Performed at:   - [address]  [id number]  : Enrrique Ernandez PhD, Phone:  [phone]    Blood Culture - Blood, Arm, Right [[id number]]  (Normal) Collected: 03/11/24 1908    Lab Status: Final result Specimen: Blood from Arm, Right Updated: 03/16/24 1931     Blood Culture No growth at 5 days           Recent films:  XR Chest 1 View    Result Date: 3/18/2024  EXAM: XR CHEST 1 VW- 3/18/2024 2:50 AM  HISTORY: CHF, Pneumonia; R13.10-Dysphagia, unspecified; Z74.09-Other reduced mobility; J96.01-Acute respiratory failure with hypoxia   COMPARISON: 3/15/2024.  TECHNIQUE: Single frontal radiograph of the chest was obtained.  FINDINGS:  Support Devices: None.  Cardiac and Mediastinal Silhouettes: Unchanged.  Lungs/Pleura: Low lung volumes with stable bibasilar pleural-parenchymal opacities. No visible pneumothorax.  Osseous structures: No acute osseous finding.  Other: None.      Impression:  Low lung volumes with stable bibasilar pleural-parenchymal opacities.    This report was signed and finalized on 3/18/2024 7:03 AM by Chay Mcdonnell.      Personal review of imaging : Chest imaging study shows low lung volumes with stable bilateral infiltrate.  No new imaging studies done today      Pulmonary Assessment:  Severe acute on chronic respiratory failure  Dependent on supplemental oxygen  Congestive diastolic heart failure fluid overload  Bibasilar atelectasis  History of COVID infection  Obesity with possible sleep apnea    Recommend/plan:   He was seen in the follow-up visit in pulmonary rounds in medical floor today.  He is sitting up in the chair on 4L oxygen appears stable.  His wife was  at bedside.  He is getting home oxygen evaluation done today.  On prednisone 40 mg daily  Ankle edema improved with Lasix.  Already completed doxycycline.  Chest x-ray shows bilateral persistent infiltrate which is most likely chronic  Incentive spirometry and flutter valve.  Routine respiratory care and bronchodilator treatment.  Plan for outpatient PFT and sleep study and follow-up with Dr. Zamudio as outpatient after discharge.  DVT and stress ulcer prophylaxis.  Pain and anxiety control.  PT/OT/nutritional support.  Anticipate discharge tomorrow morning otherwise stable.  He is also getting workup for the liver cyst  CODE STATUS: Full.  Overall prognosis: Guarded.  We will follow    Time spent by me: 35 min    This visit was performed by both a physician and an [facility] RN.  I performed all aspects of the medical decision making as documented.    Electronically signed by     Jagruti Contreras MD,  Pulmonologist/Intensivist   3/18/2024, 18:31 CDT

## 2024-03-18 NOTE — PLAN OF CARE
Goal Outcome Evaluation:              Outcome Evaluation: Blood sugars elevated at supper time, md aware, additional insulin orders given, no c/o of pain, now on 3L/NC, good urine output, up in chair all day, HR NSR 73-99, safety maintained

## 2024-03-18 NOTE — PLAN OF CARE
Goal Outcome Evaluation:              Outcome Evaluation: S/ST  per tele. no c/o pain. vss. BG was high, given sliding scale and long acting insulin. pt has slept throughout most of the shift up in the chair. will continue to monitor.

## 2024-03-18 NOTE — CASE MANAGEMENT/SOCIAL WORK
Continued Stay Note   Teri     Patient Name: Kolby Michel  MRN: [id number]  Today's Date: 3/18/2024    Admit Date: 3/11/2024    Plan: Home   Discharge Plan       Row Name 03/18/24 0958       Plan    Plan Home    Plan Comments Chart reviewed. Note possible discharge home tomorrow. [facility] No other needs identified at this time.    Final Discharge Disposition Code 01 - home or self-care             RENO Paulino

## 2024-03-18 NOTE — PLAN OF CARE
----- Message from Don Platt MD sent at 11/10/2017  9:16 AM CST -----  Review at follow up  Increase levothyroxine to 100 mcg daily and recheck tsh in 6 weeks Goal Outcome Evaluation:  Plan of Care Reviewed With: patient        Progress: improving  Outcome Evaluation: Pt was up in the chair on 3L at 93%, placed on 4L for activity.  Transfered sit to stand with supervision.  Amb 300' x 2 with standing rest and SBA, O2 decreased to 86%.  Wtih rest pt recovered back to the 90's and was placed back on 3L

## 2024-03-18 NOTE — PROGRESS NOTES
"    [facility] Medicine Services  INPATIENT PROGRESS NOTE    Patient Name: Kolby Michel  Date of Admission: 3/11/2024  Today's Date: 03/18/24  Length of Stay: 7  Primary Care Physician: Marcos Figueroa MD    Subjective   Chief Complaint: Shortness of breath  HPI   Patient states that he is feeling okay this morning.  He remains on 4 L of oxygen and has to increase his O2 requirement with exertion.  Reports excellent diuresis after receiving Lasix.  We did have some issues yesterday with hyperglycemia in the setting of ongoing steroid administration and he required a few additional doses of insulin.  States that his appetite has been excellent and he has been eating \"everything on my tray.\"  Denies any pain symptoms.    Review of Systems   All pertinent negatives and positives are as above. All other systems have been reviewed and are negative unless otherwise stated.     Objective    Temp:  [97.4 °F (36.3 °C)-98.2 °F (36.8 °C)] 98.2 °F (36.8 °C)  Heart Rate:  [79-90] 82  Resp:  [16-18] 18  BP: (126-149)/(63-79) 136/67  Physical Exam  Vitals reviewed.   Constitutional:       Appearance: He is not ill-appearing or toxic-appearing.   HENT:      Head: Normocephalic.      Mouth/Throat:      Mouth: Mucous membranes are moist.   Eyes:      Pupils: Pupils are equal, round, and reactive to light.   Cardiovascular:      Rate and Rhythm: Normal rate.   Pulmonary:      Effort: Pulmonary effort is normal. No respiratory distress.      Comments: Mild crackly BSs at bases; on 4LNC  Musculoskeletal:      Right lower leg: Edema present.      Left lower leg: Edema present.   Skin:     General: Skin is warm.   Neurological:      General: No focal deficit present.      Mental Status: He is alert.   Psychiatric:         Mood and Affect: Mood normal.         Results Review:  I have reviewed the labs, radiology results, and diagnostic studies.    Laboratory Data:   Results from last 7 days   Lab Units " 03/16/24 0349 03/14/24 0426 03/13/24  0144   WBC 10*3/mm3 16.63* 13.15* 15.37*   HEMOGLOBIN g/dL 13.0 12.0* 12.4*   HEMATOCRIT % 40.8 37.3* 38.8   PLATELETS 10*3/mm3 247 176 158        Results from last 7 days   Lab Units 03/18/24  0430 03/16/24  0349 03/14/24  0426 03/13/24  0144 03/12/24  0242   SODIUM mmol/L 140 140 138 137 135*   POTASSIUM mmol/L 3.9 4.7 4.0 3.7 3.9   CHLORIDE mmol/L 97* 101 102 100 95*   CO2 mmol/L 37.0* 34.0* 29.0 28.0 28.0   BUN mg/dL 35* 36* 29* 34* 37*   CREATININE mg/dL 0.88 1.03 0.96 1.08 1.89*   CALCIUM mg/dL 9.1 9.5 8.7 8.6 8.7   BILIRUBIN mg/dL  --   --   --  0.5 0.8   ALK PHOS U/L  --   --   --  103 92   ALT (SGPT) U/L  --   --   --  22 22   AST (SGOT) U/L  --   --   --  20 21   GLUCOSE mg/dL 317* 366* 169* 144* 154*       Culture Data:   Blood Culture   Date Value Ref Range Status   03/11/2024 No growth at 3 days  Preliminary   03/11/2024 No growth at 3 days  Preliminary       Radiology Data:   Imaging Results (Last 24 Hours)       Procedure Component Value Units Date/Time    XR Chest 1 View [[id number]] Collected: 03/18/24 0701     Updated: 03/18/24 0706    Narrative:      EXAM: XR CHEST 1 VW- 3/18/2024 2:50 AM     HISTORY: CHF, Pneumonia; R13.10-Dysphagia, unspecified; Z74.09-Other  reduced mobility; J96.01-Acute respiratory failure with hypoxia       COMPARISON: 3/15/2024.     TECHNIQUE: Single frontal radiograph of the chest was obtained.     FINDINGS:     Support Devices: None.     Cardiac and Mediastinal Silhouettes: Unchanged.     Lungs/Pleura: Low lung volumes with stable bibasilar pleural-parenchymal  opacities. No visible pneumothorax.     Osseous structures: No acute osseous finding.     Other: None.       Impression:         Low lung volumes with stable bibasilar pleural-parenchymal opacities.           This report was signed and finalized on 3/18/2024 7:03 AM by Chay Mcdonnell.               I have reviewed the patient's current medications.     Assessment/Plan    Assessment  Active Hospital Problems    Diagnosis     **Acute respiratory failure with hypoxemia     Liver mass     History of COVID-19     Orthopnea     Pneumonia, unspecified organism     Atypical pneumonia     DM (diabetes mellitus), type 2     Essential hypertension, benign     Obesity (BMI 30-39.9)        Treatment Plan  Pulmonary following and appreciate their assistance  Transition to PO lasix  Echocardiogram results as follows:  Results for orders placed during the hospital encounter of 03/11/24    Adult Transthoracic Echo Complete W/ Cont if Necessary Per Protocol    Interpretation Summary    Left ventricular systolic function is hyperdynamic (EF > 70%). Left ventricular ejection fraction appears to be greater than 70%.    Left ventricular diastolic function was normal.    Estimated right ventricular systolic pressure from tricuspid regurgitation is normal (<35 mmHg).    Normal size and function of the right ventricle.    No significant valvular pathology.    There is an incidental finding of a lesion/mass in the liver.  This could be a cyst or potentially hemangioma.  It looks to be approximately 5 x 3 cm.  Recommend further imaging if clinically indicated.    4.   Liver US results noted; I doubt we will be able to get MRI Abdomen given difficulty patient has with lying flat even for a CT (MRI will take 20-30 min to complete)  5.  Wean 02 as tolerated; still on 4LNC this AM with increased 02 requirement with exertion; try to wean again today  6.  Now on PO Prednisone with taper  7.   Walking oximetry study last week as follows: Patient dropped to 84% on room air. 2L was applied to patient, after a few steps had to increase to 4 L, SPO2 only increased to 86%. Increased to 6L, patient walked 100 ft and SPO2 remained at 91% or better. Patient will need 4L of oxygen at rest and 6L of oxygen when ambulating.   8.   PO Doxycycline (completed 3 days of Azithromycin and Ceftriaxone, then transitioned to  Doxycyline) - completed 6 days of Abxs  9.   Incentive spirometry  10.   Hyperglycemia exacerbated by ongoing steroid administration.  Adjust SSI coverage. Adjust SQ insulin Levemir dosing today again.  Patient on ozempic in the outpatient setting  11.  Encourage increase activity  12.  Dispo: plan to discharge home tomorrow.  Patient followed by Dr. Mccann in the outpatient setting.  Discussed with patient plans for outpatient follow-up regarding liver lesion (unable to be flat for study at present)      Medical Decision Making  Number and Complexity of problems: 2 acute; moderate complexity      Conditions and Status        Condition is improving.     [facility] Data  External documents reviewed: none  Cardiac tracing (EKG, telemetry) interpretation: no new EKGs  Radiology interpretation: CXR reviewed with no new or acute changes  Labs reviewed: as above  Any tests that were considered but not ordered: CT Chest     Decision rules/scores evaluated (example [id number], Wells, etc): none     Discussed with: patient      Care Planning  Shared decision making: Discussed at length with patient with agreement to proceed with treatment plan as outlined  Code status and discussions: Full Code    Disposition  Social Determinants of Health that impact treatment or disposition: none  I expect the patient to be discharged to home tomorrow    Electronically signed by Андрей Ledesma MD, 03/18/24, 09:44 CDT.

## 2024-03-19 ENCOUNTER — READMISSION MANAGEMENT (OUTPATIENT)
Dept: CALL CENTER | Facility: HOSPITAL | Age: 69
End: 2024-03-19
Payer: COMMERCIAL

## 2024-03-19 ENCOUNTER — CARE COORDINATION (OUTPATIENT)
Dept: CARE COORDINATION | Age: 69
End: 2024-03-19

## 2024-03-19 ENCOUNTER — TELEPHONE (OUTPATIENT)
Dept: FAMILY MEDICINE CLINIC | Age: 69
End: 2024-03-19

## 2024-03-19 VITALS
WEIGHT: 296.6 LBS | SYSTOLIC BLOOD PRESSURE: 144 MMHG | RESPIRATION RATE: 16 BRPM | BODY MASS INDEX: 39.31 KG/M2 | DIASTOLIC BLOOD PRESSURE: 67 MMHG | HEIGHT: 73 IN | HEART RATE: 82 BPM | TEMPERATURE: 97.8 F | OXYGEN SATURATION: 96 %

## 2024-03-19 PROBLEM — J98.11 ATELECTASIS: Status: ACTIVE | Noted: 2024-03-19

## 2024-03-19 LAB — GLUCOSE BLDC GLUCOMTR-MCNC: 176 MG/DL (ref 70–130)

## 2024-03-19 PROCEDURE — 63710000001 INSULIN DETEMIR PER 5 UNITS: Performed by: INTERNAL MEDICINE

## 2024-03-19 PROCEDURE — 94618 PULMONARY STRESS TESTING: CPT

## 2024-03-19 PROCEDURE — 63710000001 INSULIN LISPRO (HUMAN) PER 5 UNITS: Performed by: INTERNAL MEDICINE

## 2024-03-19 PROCEDURE — 99232 SBSQ HOSP IP/OBS MODERATE 35: CPT | Performed by: INTERNAL MEDICINE

## 2024-03-19 PROCEDURE — 25010000002 HEPARIN (PORCINE) PER 1000 UNITS: Performed by: FAMILY MEDICINE

## 2024-03-19 PROCEDURE — 25010000002 HYDRALAZINE PER 20 MG: Performed by: FAMILY MEDICINE

## 2024-03-19 PROCEDURE — 82948 REAGENT STRIP/BLOOD GLUCOSE: CPT

## 2024-03-19 PROCEDURE — 97116 GAIT TRAINING THERAPY: CPT

## 2024-03-19 RX ORDER — DOXYCYCLINE 100 MG/1
100 TABLET ORAL 2 TIMES DAILY
COMMUNITY
Start: 2024-03-14

## 2024-03-19 RX ORDER — LISINOPRIL 20 MG/1
20 TABLET ORAL
Status: DISCONTINUED | OUTPATIENT
Start: 2024-03-19 | End: 2024-03-19 | Stop reason: HOSPADM

## 2024-03-19 RX ORDER — PREDNISONE 10 MG/1
TABLET ORAL
COMMUNITY
Start: 2024-03-19

## 2024-03-19 RX ORDER — PREDNISONE 10 MG/1
10 TABLET ORAL DAILY
Qty: 2 TABLET | Refills: 0 | Status: SHIPPED | OUTPATIENT
Start: 2024-03-19 | End: 2024-03-21

## 2024-03-19 RX ORDER — PREDNISONE 10 MG/1
TABLET ORAL
Qty: 31 TABLET | Refills: 0 | Status: SHIPPED | OUTPATIENT
Start: 2024-03-19

## 2024-03-19 RX ADMIN — INSULIN LISPRO 3 UNITS: 100 INJECTION, SOLUTION INTRAVENOUS; SUBCUTANEOUS at 09:05

## 2024-03-19 RX ADMIN — FAMOTIDINE 40 MG: 20 TABLET, FILM COATED ORAL at 09:04

## 2024-03-19 RX ADMIN — ROSUVASTATIN CALCIUM 10 MG: 10 TABLET, FILM COATED ORAL at 09:05

## 2024-03-19 RX ADMIN — HYDRALAZINE HYDROCHLORIDE 5 MG: 20 INJECTION INTRAMUSCULAR; INTRAVENOUS at 02:25

## 2024-03-19 RX ADMIN — PIOGLITAZONE 30 MG: 30 TABLET ORAL at 09:04

## 2024-03-19 RX ADMIN — INSULIN DETEMIR 15 UNITS: 100 INJECTION, SOLUTION SUBCUTANEOUS at 09:05

## 2024-03-19 RX ADMIN — AMLODIPINE BESYLATE 5 MG: 5 TABLET ORAL at 09:05

## 2024-03-19 RX ADMIN — SERTRALINE 50 MG: 50 TABLET, FILM COATED ORAL at 09:05

## 2024-03-19 RX ADMIN — Medication 10 ML: at 09:05

## 2024-03-19 RX ADMIN — FUROSEMIDE 40 MG: 40 TABLET ORAL at 09:04

## 2024-03-19 RX ADMIN — GABAPENTIN 600 MG: 300 CAPSULE ORAL at 09:05

## 2024-03-19 RX ADMIN — HEPARIN SODIUM 5000 UNITS: 5000 INJECTION INTRAVENOUS; SUBCUTANEOUS at 09:05

## 2024-03-19 NOTE — TELEPHONE ENCOUNTER
Care Transitions Initial Follow Up Call    Outreach made within 2 business days of discharge: Yes    Patient: Sae Wilson Patient : 1955   MRN: [id number]  Reason for Admission: There are no discharge diagnoses documented for the most recent discharge.  Discharge Date: 3/14/24       Spoke with: pt    Discharge department/facility: [facility] Interactive Patient Contact:  Was patient able to fill all prescriptions: Yes  Was patient instructed to bring all medications to the follow-up visit: Yes  Is patient taking all medications as directed in the discharge summary? Yes  Does patient understand their discharge instructions: Yes  Does patient have questions or concerns that need addressed prior to 7-14 day follow up office visit: no    Scheduled appointment with PCP within 7-14 days    Follow Up  Future Appointments   Date Time Provider Department Center   3/21/2024 11:00 AM Nitesh Lawson MD [facility]       Abby Holbrook MA

## 2024-03-19 NOTE — PROGRESS NOTES
[facility] PULMONARY AND CRITICAL CARE PROGRESS NOTE - [facility]    Patient: Kolby Michel  1955   MR# [id number]   Acct# [id number]  03/19/24   08:52 CDT  Referring Provider: Андрей Ledesma MD    Chief Complaint: Shortness of breath  Interval history: He is seen awake up in the chair.  Oxygen saturations stable on 4 L nasal cannula.  Plans for him to go home today.  [facility]  Will Rx prednisone taper.  He has follow-up in our office with JUSTYNA Clemons 3-.  Febrile.  No acute events reported.      Meds:  amLODIPine, 5 mg, Oral, Daily  famotidine, 40 mg, Oral, Daily  furosemide, 40 mg, Oral, Daily  gabapentin, 600 mg, Oral, Q12H  heparin (porcine), 5,000 Units, Subcutaneous, Q12H  insulin detemir, 15 Units, Subcutaneous, Q12H  insulin lispro, 3-14 Units, Subcutaneous, 4x Daily AC & at Bedtime  lisinopril, 20 mg, Oral, Q24H  pioglitazone, 30 mg, Oral, Daily  [Held by provider] predniSONE, 20 mg, Oral, Daily With Breakfast  rosuvastatin, 10 mg, Oral, Daily  sertraline, 50 mg, Oral, Daily  sodium chloride, 10 mL, Intravenous, Q12H           Physical Exam:  SpO2 Percentage    03/18/24 2311 03/19/24 0230 03/19/24 0743   SpO2: 95% 94% 96%     Body mass index is 39.13 kg/m².   Temp:  [97.7 °F (36.5 °C)-98.4 °F (36.9 °C)] 97.8 °F (36.6 °C)  Heart Rate:  [75-90] 82  Resp:  [16-18] 16  BP: (115-177)/(61-80) 144/67  Intake/Output Summary (Last 24 hours) at 3/19/2024 0852  Last data filed at 3/18/2024 2130  Gross per 24 hour   Intake 240 ml   Output 2900 ml   Net -2660 ml     Physical Exam  Constitutional:       General: He is not in acute distress.     Interventions: Nasal cannula in place.      Comments: 4l   HENT:      Head: Normocephalic.      Nose: Nose normal.      Mouth/Throat:      Mouth: Mucous membranes are moist.   Eyes:      General: No scleral icterus.  Cardiovascular:      Rate and Rhythm: Normal rate.   Pulmonary:      Effort: No respiratory distress.       Breath sounds: Decreased breath sounds present.   Abdominal:      General: There is no distension.   Musculoskeletal:      Right lower leg: No edema.      Left lower leg: No edema.   Neurological:      Mental Status: He is alert and oriented to person, place, and time. Mental status is at baseline.   Psychiatric:         Mood and Affect: Mood normal.         Behavior: Behavior is cooperative.         Electronically signed by JUSTYNA Garcia, 3/19/2024, 08:52 CDT        Result Review    Laboratory Data:  Results from last 7 days   Lab Units 03/16/24 0349 03/14/24 0426 03/13/24  0144   WBC 10*3/mm3 16.63* 13.15* 15.37*   HEMOGLOBIN g/dL 13.0 12.0* 12.4*   PLATELETS 10*3/mm3 247 176 158     Results from last 7 days   Lab Units 03/18/24  0430 03/16/24 0349 03/14/24  0426 03/13/24  0144   SODIUM mmol/L 140 140 138 137   POTASSIUM mmol/L 3.9 4.7 4.0 3.7   CHLORIDE mmol/L 97* 101 102 100   CO2 mmol/L 37.0* 34.0* 29.0 28.0   BUN mg/dL 35* 36* 29* 34*   CREATININE mg/dL 0.88 1.03 0.96 1.08   CALCIUM mg/dL 9.1 9.5 8.7 8.6   ALK PHOS U/L  --   --   --  103   ALT (SGPT) U/L  --   --   --  22   AST (SGOT) U/L  --   --   --  20         Lab 03/18/24  0430 03/16/24 0349 03/14/24  0426   GLUCOSE 317* 366* 169*             Microbiology Results (last 10 days)       Procedure Component Value - Date/Time    S. Pneumo Ag Urine or CSF - Urine, Urine, Clean Catch [[id number]]  (Normal) Collected: 03/12/24 1134    Lab Status: Final result Specimen: Urine, Clean Catch Updated: 03/12/24 1209     Strep Pneumo Ag Negative    Legionella Antigen, Urine - Urine, Urine, Clean Catch [[id number]]  (Normal) Collected: 03/12/24 1134    Lab Status: Final result Specimen: Urine, Clean Catch Updated: 03/12/24 2120     LEGIONELLA ANTIGEN, URINE Negative    Respiratory Panel PCR w/COVID-19(SARS-CoV-2) JUDITH/SELENE/LYLE/PAD/COR/NEERU In-House, NP Swab in UTM/VTM, 2 HR TAT - Swab, Nasopharynx [[id number]]  (Normal) Collected: 03/12/24 0831    Lab Status:  Final result Specimen: Swab from Nasopharynx Updated: 03/12/24 1007     ADENOVIRUS, PCR Not Detected     Coronavirus 229E Not Detected     Coronavirus HKU1 Not Detected     Coronavirus NL63 Not Detected     Coronavirus OC43 Not Detected     COVID19 Not Detected     Human Metapneumovirus Not Detected     Human Rhinovirus/Enterovirus Not Detected     Influenza A PCR Not Detected     Influenza B PCR Not Detected     Parainfluenza Virus 1 Not Detected     Parainfluenza Virus 2 Not Detected     Parainfluenza Virus 3 Not Detected     Parainfluenza Virus 4 Not Detected     RSV, PCR Not Detected     Bordetella pertussis pcr Not Detected     Bordetella parapertussis PCR Not Detected     Chlamydophila pneumoniae PCR Not Detected     Mycoplasma pneumo by PCR Not Detected    Narrative:      In the setting of a positive respiratory panel with a viral infection PLUS a negative procalcitonin without other underlying concern for bacterial infection, consider observing off antibiotics or discontinuation of antibiotics and continue supportive care. If the respiratory panel is positive for atypical bacterial infection (Bordetella pertussis, Chlamydophila pneumoniae, or Mycoplasma pneumoniae), consider antibiotic de-escalation to target atypical bacterial infection.    Blood Culture - Blood, Arm, Left [[id number]]  (Normal) Collected: 03/11/24 1909    Lab Status: Final result Specimen: Blood from Arm, Left Updated: 03/16/24 1931     Blood Culture No growth at 5 days    Mycoplasma Pneumoniae Antibody, IgM - Blood, Arm, Left [[id number]] Collected: 03/11/24 1909    Lab Status: Final result Specimen: Blood from Arm, Left Updated: 03/13/24 1513     M pneumoniae IgM Abs <770 U/mL      Comment:                              Negative            <770  Clinically significant amount of M. pneumoniae antibody  not detected.                               Low Positive   770 - 950  M. pneumoniae specific IgM presumptively detected.  It  is recommended  that another sample be collected 1-2  weeks later to assure reactivity.                               Positive            >950  Highly significant amount of M. pneumoniae specific  IgM antibody detected.       Narrative:      Performed at:  [address]  [id number]  : Enrrique Ernandez PhD, Phone:  [phone]    Blood Culture - Blood, Arm, Right [[id number]]  (Normal) Collected: 03/11/24 1908    Lab Status: Final result Specimen: Blood from Arm, Right Updated: 03/16/24 1931     Blood Culture No growth at 5 days           Recent films:  Walking Oximetry    Result Date: 3/19/2024  Dustin Villanueva, CRT     3/19/2024 10:00 AM Exercise Oximetry Patient Name:Kolby Michel MRN: [id number] Date: 03/19/24         ROOM AIR BASELINE SpO2%  96 Heart Rate   88 Blood Pressure  EXERCISE ON ROOM AIR SpO2% EXERCISE ON O2 @ 3 LPM SpO2% 1 MINUTE 96 1 MINUTE 88 2 MINUTES 93 2 MINUTES 89 3 MINUTES 90 3 MINUTES 90 4 MINUTES 89 4 MINUTES  5 MINUTES 87 5 MINUTES  6 MINUTES 84 6 MINUTES           Distance Walked   Distance Walked  200 foot Dyspnea (Daisy Scale)   Dyspnea (Daisy Scale) 0 Fatigue (Daisy Scale)   Fatigue (Daisy Scale) 0 SpO2% Post Exercise   SpO2% Post Exercise 90 HR Post Exercise   HR Post Exercise 107 Time to Recovery   Time to Recovery  5 min Comments: 3lpm with exertion    XR Chest 1 View    Result Date: 3/18/2024  EXAM: XR CHEST 1 VW- 3/18/2024 2:50 AM  HISTORY: CHF, Pneumonia; R13.10-Dysphagia, unspecified; Z74.09-Other reduced mobility; J96.01-Acute respiratory failure with hypoxia   COMPARISON: 3/15/2024.  TECHNIQUE: Single frontal radiograph of the chest was obtained.  FINDINGS:  Support Devices: None.  Cardiac and Mediastinal Silhouettes: Unchanged.  Lungs/Pleura: Low lung volumes with stable bibasilar pleural-parenchymal opacities. No visible pneumothorax.  Osseous structures: No acute osseous finding.  Other: None.      Impression:  Low lung volumes with stable bibasilar  pleural-parenchymal opacities.    This report was signed and finalized on 3/18/2024 7:03 AM by Chay Mcdonnell.            Pulmonary Assessment:  Acute and now chronic resp failure with hypoxia, improved, but still requiring oxygen  CHF better  Atelectasis better    Recommend/plan:   Home o2 assessment.  Done, showing need for home o2   Recommend home oxygen3 lpm  Reassess in office  Outpatient PFT    This visit was performed by both a physician and an Advanced Practice RN.  I performed all aspects of the medical decision making as documented.  Electronically signed by Esequiel Zamudio MD, 3/19/2024, 21:45 CDT

## 2024-03-19 NOTE — DISCHARGE SUMMARY
[facility] Medicine Services  DISCHARGE SUMMARY       Date of Admission: 3/11/2024  Date of Discharge:  3/19/2024  Primary Care Physician: Marcos Figueroa MD    Presenting Problem/History of Present Illness:  Shortness of breath    Final Discharge Diagnoses:  Active Hospital Problems    Diagnosis     **Acute respiratory failure with hypoxemia     Atelectasis     Liver mass     History of COVID-19     Orthopnea     Pneumonia, unspecified organism     Atypical pneumonia     DM (diabetes mellitus), type 2     Essential hypertension, benign     Obesity (BMI 30-39.9)        Consults: Pulmonology    Pertinent Test Results:   Results for orders placed during the hospital encounter of 03/11/24    Adult Transthoracic Echo Complete W/ Cont if Necessary Per Protocol    Interpretation Summary    Left ventricular systolic function is hyperdynamic (EF > 70%). Left ventricular ejection fraction appears to be greater than 70%.    Left ventricular diastolic function was normal.    Estimated right ventricular systolic pressure from tricuspid regurgitation is normal (<35 mmHg).    Normal size and function of the right ventricle.    No significant valvular pathology.    There is an incidental finding of a lesion/mass in the liver.  This could be a cyst or potentially hemangioma.  It looks to be approximately 5 x 3 cm.  Recommend further imaging if clinically indicated.      Imaging Results (All)       Procedure Component Value Units Date/Time    XR Chest 1 View [[id number]] Collected: 03/18/24 0701     Updated: 03/18/24 0706    Narrative:      EXAM: XR CHEST 1 VW- 3/18/2024 2:50 AM     HISTORY: CHF, Pneumonia; R13.10-Dysphagia, unspecified; Z74.09-Other  reduced mobility; J96.01-Acute respiratory failure with hypoxia       COMPARISON: 3/15/2024.     TECHNIQUE: Single frontal radiograph of the chest was obtained.     FINDINGS:     Support Devices: None.     Cardiac and Mediastinal Silhouettes:  Unchanged.     Lungs/Pleura: Low lung volumes with stable bibasilar pleural-parenchymal  opacities. No visible pneumothorax.     Osseous structures: No acute osseous finding.     Other: None.       Impression:         Low lung volumes with stable bibasilar pleural-parenchymal opacities.           This report was signed and finalized on 3/18/2024 7:03 AM by Chay Mcdonnell.       US Liver [[id number]] Collected: 03/16/24 1430     Updated: 03/16/24 1438    Narrative:      US LIVER- 3/16/2024 12:39 PM     REASON FOR EXAM: liver mass; R13.10-Dysphagia, unspecified; Z74.09-Other  reduced mobility; J96.01-Acute respiratory failure with hypoxia       COMPARISON: NONE      TECHNIQUE: Multiple longitudinal and transverse realtime sonographic  images of the right upper quadrant of the abdomen are obtained. Images  were stored in PACS according to state institutional protocol     FINDINGS:    Pancreas: Visualized portions unremarkable..      Liver: 6.6 x 3.7 x 6.0 complex cystic lesion with possible solid  component within the left hepatic lobe. 3.1 cm simple appearing right  hepatic cyst.     Gallbladder: Cholelithiasis..      Bile ducts: The CBD measures 0.4 cm in diameter. There is no  intrahepatic or extrahepatic ductal dilation.      Other: No ascites.       Impression:         6.6 cm complex cystic lesion within the left hepatic lobe with possible  solid components. Recommend further evaluation with abdomen MR without  and with contrast.     Cholelithiasis.     This report was signed and finalized on 3/16/2024 2:35 PM by Chay Mcdonnell.       XR Chest PA & Lateral [[id number]] Collected: 03/15/24 1400     Updated: 03/15/24 1405    Narrative:      EXAMINATION: XR CHEST PA AND LATERAL- 3/15/2024 2:00 PM     HISTORY: hypoxemia; R13.10-Dysphagia, unspecified; Z74.09-Other reduced  mobility; J96.01-Acute respiratory failure with hypoxia.     REPORT: Frontal and lateral views of the chest were obtained.     COMPARISON: Chest  x-rays 3/12/2024.     The lungs are grossly hypoaerated, with basilar atelectasis as before.  No lobar consolidation is identified. There is central and basilar  vascular congestion which is also unchanged and likely related to the  low lung volumes. The heart margins are mostly obscured. No pneumothorax  is identified. There is no definite pleural effusion. No acute osseous  abnormality. There is mild to moderate dextroscoliosis of the  midthoracic spine. The upper abdomen appears unremarkable.       Impression:      Gross hypoaeration of the lungs with extensive basilar  atelectasis as well as central and basilar vascular congestion. More  mild lower lobe is not excluded with this appearance.     This report was signed and finalized on 3/15/2024 2:02 PM by Dr. Mundo Sigala MD.       US Renal Bilateral [[id number]] Collected: 03/12/24 1547     Updated: 03/12/24 1551    Narrative:      EXAM/TECHNIQUE: US RENAL BILATERAL-     INDICATION: MALLIKA; R13.10-Dysphagia, unspecified; Z74.09-Other reduced  mobility     COMPARISON: None     FINDINGS:     Abdominal aorta and IVC appear unremarkable.     Right kidney is 13.9 cm in length and demonstrates normal cortical  thickness and echogenicity. No shadowing calculi or hydronephrosis.     Left kidney is 12.9 cm in length and demonstrates normal cortical  thickness and echogenicity. No shadowing calculi or hydronephrosis.     No focal urinary bladder abnormality.       Impression:      Negative for hydronephrosis or renal atrophy.     This report was signed and finalized on 3/12/2024 3:48 PM by Dr. Josué Oseguera MD.       XR Chest 1 View [[id number]] Collected: 03/12/24 0650     Updated: 03/12/24 0655    Narrative:      EXAMINATION: XR CHEST 1 VW-     3/12/2024 2:30 AM     HISTORY: Resp failure     A frontal lordotic view of the chest is obtained. There is no previous  study for comparison.     The lungs are poorly expanded.     There are atelectatic changes in the lungs  bilaterally.     Left diaphragm is obscured and may suggest an adjacent infiltrate or  consolidation.     A probable small pleural effusion at left base.     There is no pulmonary congestion or pneumothorax.     The heart size is not optimally visualized or evaluated in this study.     No acute bony abnormality. There is a mild S-shaped scoliosis.       Impression:      1. Poor lung expansion. Atelectatic changes bilaterally. A probable left  lower lobar consolidation and a small left basal pleural effusion.              This report was signed and finalized on 3/12/2024 6:52 AM by Dr. Marva Schaffer MD.             LAB RESULTS:      Lab 03/16/24  0349 03/14/24  0426 03/13/24  0144   WBC 16.63* 13.15* 15.37*   HEMOGLOBIN 13.0 12.0* 12.4*   HEMATOCRIT 40.8 37.3* 38.8   PLATELETS 247 176 158   NEUTROS ABS 14.11*  --  12.50*   IMMATURE GRANS (ABS)  --   --  0.18*   LYMPHS ABS  --   --  0.95   MONOS ABS  --   --  1.58*   EOS ABS  --   --  0.09   MCV 91.7 91.4 91.7         Lab 03/18/24  0430 03/16/24  0349 03/14/24  0426 03/13/24  0144   SODIUM 140 140 138 137   POTASSIUM 3.9 4.7 4.0 3.7   CHLORIDE 97* 101 102 100   CO2 37.0* 34.0* 29.0 28.0   ANION GAP 6.0 5.0 7.0 9.0   BUN 35* 36* 29* 34*   CREATININE 0.88 1.03 0.96 1.08   EGFR 92.5 78.1 85.0 73.8   GLUCOSE 317* 366* 169* 144*   CALCIUM 9.1 9.5 8.7 8.6         Lab 03/13/24  0144   TOTAL PROTEIN 6.8   ALBUMIN 3.3*   GLOBULIN 3.5   ALT (SGPT) 22   AST (SGOT) 20   BILIRUBIN 0.5   ALK PHOS 103                     Brief Urine Lab Results  (Last result in the past 365 days)        Color   Clarity   Blood   Leuk Est   Nitrite   Protein   CREAT   Urine HCG        01/15/24 1010 DARK YELLOW   TURBID   Negative   Negative  Comment: Culture Urine under CMS guidelines and criteria will auto reflex on a sole  criteria that is based on manual microscopic count of more than 10/hpf for  WBC. If Culture Urine is warranted aside from this criteria, place a  requisition or order within  24 hours of collection.   Negative                   Microbiology Results (last 10 days)       Procedure Component Value - Date/Time    S. Pneumo Ag Urine or CSF - Urine, Urine, Clean Catch [[id number]]  (Normal) Collected: 03/12/24 1134    Lab Status: Final result Specimen: Urine, Clean Catch Updated: 03/12/24 1209     Strep Pneumo Ag Negative    Legionella Antigen, Urine - Urine, Urine, Clean Catch [[id number]]  (Normal) Collected: 03/12/24 1134    Lab Status: Final result Specimen: Urine, Clean Catch Updated: 03/12/24 2120     LEGIONELLA ANTIGEN, URINE Negative    Respiratory Panel PCR w/COVID-19(SARS-CoV-2) JUDITH/SELENE/LYLE/PAD/COR/NEERU In-House, NP Swab in UTM/VTM, 2 HR TAT - Swab, Nasopharynx [[id number]]  (Normal) Collected: 03/12/24 0831    Lab Status: Final result Specimen: Swab from Nasopharynx Updated: 03/12/24 1007     ADENOVIRUS, PCR Not Detected     Coronavirus 229E Not Detected     Coronavirus HKU1 Not Detected     Coronavirus NL63 Not Detected     Coronavirus OC43 Not Detected     COVID19 Not Detected     Human Metapneumovirus Not Detected     Human Rhinovirus/Enterovirus Not Detected     Influenza A PCR Not Detected     Influenza B PCR Not Detected     Parainfluenza Virus 1 Not Detected     Parainfluenza Virus 2 Not Detected     Parainfluenza Virus 3 Not Detected     Parainfluenza Virus 4 Not Detected     RSV, PCR Not Detected     Bordetella pertussis pcr Not Detected     Bordetella parapertussis PCR Not Detected     Chlamydophila pneumoniae PCR Not Detected     Mycoplasma pneumo by PCR Not Detected    Narrative:      In the setting of a positive respiratory panel with a viral infection PLUS a negative procalcitonin without other underlying concern for bacterial infection, consider observing off antibiotics or discontinuation of antibiotics and continue supportive care. If the respiratory panel is positive for atypical bacterial infection (Bordetella pertussis, Chlamydophila pneumoniae, or Mycoplasma  pneumoniae), consider antibiotic de-escalation to target atypical bacterial infection.    Blood Culture - Blood, Arm, Left [[id number]]  (Normal) Collected: 03/11/24 1909    Lab Status: Final result Specimen: Blood from Arm, Left Updated: 03/16/24 1931     Blood Culture No growth at 5 days    Mycoplasma Pneumoniae Antibody, IgM - Blood, Arm, Left [[id number]] Collected: 03/11/24 1909    Lab Status: Final result Specimen: Blood from Arm, Left Updated: 03/13/24 1513     M pneumoniae IgM Abs <770 U/mL      Comment:                              Negative            <770  Clinically significant amount of M. pneumoniae antibody  not detected.                               Low Positive   770 - 950  M. pneumoniae specific IgM presumptively detected.  It  is recommended that another sample be collected 1-2  weeks later to assure reactivity.                               Positive            >950  Highly significant amount of M. pneumoniae specific  IgM antibody detected.       Narrative:      Performed at:  [address] [place] [address]  [id number]  : Enrrique Ernandez PhD, Phone:  [phone]    Blood Culture - Blood, Arm, Right [[id number]]  (Normal) Collected: 03/11/24 1908    Lab Status: Final result Specimen: Blood from Arm, Right Updated: 03/16/24 1931     Blood Culture No growth at 5 days            Hospital Course:   Patient is a 69-year-old  male with past medical history significant for type 2 diabetes, hypertension, recent history of COVID-19 infection, that presented to our facility on 3/11/2024 secondary to fever, chills, shortness of breath, in addition to vomiting.  Patient was found to have evidence of hypoxemia on arrival, initially required treatment with BiPAP, was started on empiric antibiotics, and admitted to the hospitalist service for further workup and management.  Pulmonary was also consulted and followed along with us during this hospitalization.  Patient initially  required admission to the intensive care unit.    Patient was able to be transition from BiPAP over to Vapotherm.  Interestingly, patient did report having a COVID-19 infection last year as well, and reports requiring supplemental oxygen at home for a duration of time, but eventually was able to come off of supplemental O2.  Chest x-ray did reveal infiltrates concerning for pneumonia in addition to atelectasis.  In addition to empiric antibiotics patient was also placed on steroids.  Patient had improvement, albeit slow improvement during this hospitalization.  He was able to be moved to the medical floor uneventfully.      During his hospital stay we did want to move forward with a CT scan of the chest, however patient reported inability to lay flat even for a short period of time to get this study completed.    Respiratory PCR panel was negative.  Blood cultures have been negative.  Urine Legionella and strep pneumo antigens were also negative.    Patient was able to be weaned down to about 4 L of oxygen via nasal cannula, however initially with exertion we were having to increase his supplemental oxygen up to 12 L to maintain O2 sats greater than 90%.  He is continue to have steady improvement, and now has completed a course of antibiotics and we have continued to taper his steroids.  He has also been diligent about using his incentive spirometer.  We now have him down to 3 L by nasal cannula at rest, and titrating up to about 4 L of oxygen with exertion.  He is now able to ambulate longer distances and in fact when working with physical therapy ambulated 300 feet x 2 while on 4 L by nasal cannula.  O2 sats dipped down to 86% but with rest recovered back into the 90s.    In addition, we have been diuresing patient for the past couple of days as well.  An echocardiogram was performed with the following results:  Results for orders placed during the hospital encounter of 03/11/24    Adult Transthoracic Echo  Complete W/ Cont if Necessary Per Protocol    Interpretation Summary    Left ventricular systolic function is hyperdynamic (EF > 70%). Left ventricular ejection fraction appears to be greater than 70%.    Left ventricular diastolic function was normal.    Estimated right ventricular systolic pressure from tricuspid regurgitation is normal (<35 mmHg).    Normal size and function of the right ventricle.    No significant valvular pathology.    There is an incidental finding of a lesion/mass in the liver.  This could be a cyst or potentially hemangioma.  It looks to be approximately 5 x 3 cm.  Recommend further imaging if clinically indicated.    He has no evidence of systolic or diastolic dysfunction.      Plan for discharge today with close outpatient follow-up with his primary care provider in addition to pulmonology.  Pulmonology will get PFTs completed in the outpatient setting, and I will also arrange for an outpatient sleep study.    While on steroids he has had issues with hyperglycemia, and planning for a very quick taper at a low dose (10 mg) over the next couple of days.  He will resume his outpatient diabetes medications.  We did check an A1c during this hospitalization and it was 8.1.    Finally, on his echocardiogram there was the incidental finding of a liver lesion.  Patient was unaware of any previous history of any type of a liver lesion or cyst.  An abdominal ultrasound was performed revealing a 6 cm complex cystic lesion in the left hepatic lobe.  Radiology recommended an MRI of the abdomen for further workup and assessment.  Again, we discussed that an MRI of the abdomen would likely take 20-30 minutes to complete, and at this time patient reports that he would have difficulty laying flat for this duration.  We discussed a plan for close outpatient follow-up regarding this liver lesion as my hope is that his respiratory status will continue to recover and improve and we will be able to move forward  "with additional imaging to better identify the nature of this liver lesion.  Plan for close outpatient follow-up with his primary care provider.  May require referral to GI or other liver specialist pending the above.    Plan for outpatient follow-up in the pulmonology clinic in about 2 weeks to include outpatient PFTs.  Plan for outpatient arrangements of a sleep study as well.      Physical Exam on Discharge:  /67 (BP Location: Left arm, Patient Position: Lying)   Pulse 82   Temp 97.8 °F (36.6 °C) (Oral)   Resp 16   Ht 185.4 cm (73\")   Wt 135 kg (296 lb 9.6 oz)   SpO2 96%   BMI 39.13 kg/m²   Physical Exam  Vitals reviewed.   Constitutional:       General: He is not in acute distress.     Appearance: He is not ill-appearing.   HENT:      Head: Normocephalic.      Mouth/Throat:      Mouth: Mucous membranes are moist.   Pulmonary:      Effort: Pulmonary effort is normal. No respiratory distress.      Comments: On 3LNC  Musculoskeletal:      Right lower leg: Edema present.      Left lower leg: Edema present.      Comments: Improving lower extremity edema   Neurological:      General: No focal deficit present.      Mental Status: He is alert.       Condition on Discharge: medically stable    Discharge Disposition:  Home or Self Care    Discharge Medications:     Discharge Medications        New Medications        Instructions Start Date   predniSONE 10 MG tablet  Commonly known as: DELTASONE   10 mg, Oral, Daily             Continue These Medications        Instructions Start Date   amLODIPine 5 MG tablet  Commonly known as: NORVASC   5 mg, Oral, Daily      gabapentin 600 MG tablet  Commonly known as: NEURONTIN   600 mg, Oral, 2 Times Daily      lisinopril-hydrochlorothiazide 20-25 MG per tablet  Commonly known as: PRINZIDE,ZESTORETIC   1 tablet, Oral, Daily      Ozempic (1 MG/DOSE) 4 MG/3ML solution pen-injector  Generic drug: Semaglutide (1 MG/DOSE)   1 mg, Subcutaneous, Weekly, SATURDAY    "   pioglitazone 30 MG tablet  Commonly known as: ACTOS   30 mg, Oral, Daily      rosuvastatin 10 MG tablet  Commonly known as: CRESTOR   10 mg, Oral, Daily      sertraline 50 MG tablet  Commonly known as: ZOLOFT   100 mg, Oral, Daily               Discharge Diet: diabetic diet    Activity at Discharge: as tolerated; encourage ambulation; encourage use of incentive spirometry    Follow-up Appointments:   Future Appointments   Date Time Provider Department Center   3/28/2024  3:00 PM Mayra Weeks APRN [facility]   1 week follow-up with PCP, Dr. Marcos Mccann for post-hospitalization assessment regarding above mentioned issues    Test Results Pending at Discharge: outpatient follow-up regarding liver lesion is needed (discussed above)    Electronically signed by Андрей Ledesma MD, 03/19/24, 08:50 CDT.    Time: 35 minutes.

## 2024-03-19 NOTE — PROCEDURES
Exercise Oximetry    Patient Name:Kolby Michel   MRN: [id number]   Date: 03/19/24             ROOM AIR BASELINE   SpO2%  96   Heart Rate   88   Blood Pressure      EXERCISE ON ROOM AIR SpO2% EXERCISE ON O2 @ 3 LPM SpO2%   1 MINUTE 96 1 MINUTE 88   2 MINUTES 93 2 MINUTES 89   3 MINUTES 90 3 MINUTES 90   4 MINUTES 89 4 MINUTES    5 MINUTES 87 5 MINUTES    6 MINUTES 84 6 MINUTES               Distance Walked   Distance Walked  200 foot   Dyspnea (Daisy Scale)   Dyspnea (Daisy Scale) 0   Fatigue (Daisy Scale)   Fatigue (Daisy Scale) 0   SpO2% Post Exercise   SpO2% Post Exercise 90   HR Post Exercise   HR Post Exercise 107   Time to Recovery   Time to Recovery  5 min     Comments: 3lpm with exertion

## 2024-03-19 NOTE — PLAN OF CARE
Goal Outcome Evaluation:           Progress: improving  Outcome Evaluation: VSS no tele per order.  BP was elevated 177/80 early this morning, 5mg prn hydralazine given and SBP came down to 150s. BS has been in 300s this shift.  Denies pain, sob, or nausea at this time.  Will continue to monitor.

## 2024-03-19 NOTE — CASE MANAGEMENT/SOCIAL WORK
Continued Stay Note  [facility]     Patient Name: Kolby Michel  MRN: [id number]  Today's Date: 3/19/2024    Admit Date: 3/11/2024    Plan: Home Oxygen Update   Discharge Plan       Row Name 03/19/24 0918       Plan    Plan Home Oxygen Update    Final Discharge Disposition Code 01 - home or self-care    Final Note Patient is discharged today and referral was made to [facility] on 3/15 for home o2. Since patient did not discharge that date, patient will need new walking oximetry (testing must be within 48 hrs of discharge). [facility] did bring tank to room but new testing will be required for [facility] to bill patient's insurance. MALACHI Kong has ordered new walking oximetry. Will fax to Chichi at [facility] when complete.               Expected Discharge Date and Time       Expected Discharge Date Expected Discharge Time    Mar 19, 2024         RENO Paulino

## 2024-03-19 NOTE — THERAPY TREATMENT NOTE
Acute Care - Physical Therapy Treatment Note  [facility]     Patient Name: Kolby Michel  : 1955  MRN: [id number]  Today's Date: 3/19/2024      Visit Dx:     ICD-10-CM ICD-9-CM   1. Dysphagia, unspecified type [R13.10]  R13.10 787.20   2. Impaired mobility [Z74.09]  Z74.09 799.89   3. Acute respiratory failure with hypoxemia  J96.01 518.81   4. Obesity (BMI 30-39.9)  E66.9 278.00     Patient Active Problem List   Diagnosis    Acute respiratory failure with hypoxemia    Atypical pneumonia    DM (diabetes mellitus), type 2    Essential hypertension, benign    Obesity (BMI 30-39.9)    Pneumonia, unspecified organism    History of COVID-19    Orthopnea    Liver mass    Atelectasis     Past Medical History:   Diagnosis Date    Diabetes mellitus     Hyperlipidemia     Hypertension      History reviewed. No pertinent surgical history.  PT Assessment (Last 12 Hours)       PT Evaluation and Treatment       Row Name 24 0951          Physical Therapy Time and Intention    Subjective Information no complaints  -WK     Document Type therapy note (daily note)  -WK     Mode of Treatment physical therapy  -WK       Row Name 24 0951          General Information    Existing Precautions/Restrictions fall;oxygen therapy device and L/min  3L at rest, 4L with activity  -WK       Row Name 24 0951          Sit-Stand Transfer    Sit-Stand [place] (Transfers) supervision  -WK       Row Name 24 0951          Stand-Sit Transfer    Stand-Sit [place] (Transfers) supervision  -WK       Row Name 24 0951          Gait/Stairs (Locomotion)    [place] Level (Gait) standby assist  -WK     Distance in Feet (Gait) 200  -WK     Deviations/Abnormal Patterns (Gait) gait speed decreased  -WK     Comment, (Gait/Stairs) desat to 87% while amb on 3 L and once sitting pt desat to 94%. [place] on safety at home.  -WK       Row Name 24 0951          Plan of Care Review    Plan of Care Reviewed With patient   -WK     Outcome Evaluation Sit to stand SPV. Pt amb 200' SBA with room air -3L o2 with RT monitoring. Pt desat to 87 % and 3 L o2 applied. Pt performed sitting rest break and o2 desat to 83% with cues for breathing. Educated on safety with o2 desat with activity at home and ways to improve and attempt to prevent desat.  -WK       Row Name 03/19/24 0951          Positioning and Restraints    Pre-Treatment Position sitting in chair/recliner  -WK     Post Treatment Position chair  -WK     In Chair sitting  -WK               User Key  (r) = Recorded By, (t) = Taken By, (c) = Cosigned By      Initials Name Provider Type    WK Loreta Zamudio PTA Physical Therapist Assistant                    Physical Therapy Education       Title: PT OT SLP Therapies (In Progress)       Topic: Physical Therapy (In Progress)       Point: Mobility training (In Progress)       Learning Progress Summary             Patient Acceptance, E, NR by BESSY at 3/15/2024 1543    Comment: energy conservation    Acceptance, E,D, NR by BESSY at 3/14/2024 1206    Comment: increasing Base of support    Acceptance, E, VU by ADELA at 3/12/2024 1152    Comment: stair negotiation safety with O2 demands, gait and long distance walks with proper breathing tecnhiques   Family Acceptance, E, VU by ADELA at 3/12/2024 1152    Comment: stair negotiation safety with O2 demands, gait and long distance walks with proper breathing tecnhiques                         Point: Precautions (In Progress)       Learning Progress Summary             Patient Acceptance, E,D, NR by BESSY at 3/13/2024 1239    Comment: purse lip breathing    Acceptance, E, VU by ADELA at 3/12/2024 1152    Comment: stair negotiation safety with O2 demands, gait and long distance walks with proper breathing tecnhiques   Family Acceptance, E, VU by ADELA at 3/12/2024 1152    Comment: stair negotiation safety with O2 demands, gait and long distance walks with proper breathing tecnhiques                                          User Key       Initials Effective Dates Name Provider Type Discipline     02/03/23 -  Nicolasa Prabhakar PTA Physical Therapist Assistant PT    AJ 02/22/24 -  Chay Mccollum, [employer] Student PT Student PT                  PT Recommendation and Plan     Plan of Care Reviewed With: patient  Outcome Evaluation: Sit to stand SPV. Pt amb 200' SBA with room air -3L o2 with RT monitoring. Pt desat to 87 % and 3 L o2 applied. Pt performed sitting rest break and o2 desat to 83% with cues for breathing. Educated on safety with o2 desat with activity at home and ways to improve and attempt to prevent desat.   Outcome Measures       Row Name 03/19/24 0951 03/18/24 1031          How much help from another person do you currently need...    Turning from your back to your side while in flat bed without using bedrails? 4  -WK 4  -LEXII     Moving from lying on back to sitting on the side of a flat bed without bedrails? 4  -WK 4  -LEXII     Moving to and from a bed to a chair (including a wheelchair)? 4  -WK 4  -LEXII     Standing up from a chair using your arms (e.g., wheelchair, bedside chair)? 4  -WK 4  -LEXII     Climbing 3-5 steps with a railing? 3  -WK 3  -LEXII     To walk in hospital room? 4  -WK 4  -LEXII     AM-PAC 6 Clicks Score (PT) 23  -WK 23  -LEXII     Highest Level of Mobility Goal 7 --> Walk 25 feet or more  -WK 7 --> Walk 25 feet or more  -LEXII        Functional Assessment    Outcome Measure Options AM-PAC 6 Clicks Basic Mobility (PT)  -WK AM-PAC 6 Clicks Basic Mobility (PT)  -LEXII               User Key  (r) = Recorded By, (t) = Taken By, (c) = Cosigned By      Initials Name Provider Type    LEXII Victorino Guillaume PTA Physical Therapist Assistant    WK Loreta Zamudio PTA Physical Therapist Assistant                     Time Calculation:    PT Charges       Row Name 03/19/24 1007             Time Calculation    Start Time 0951  -WK      Stop Time 1000  -WK      Time Calculation (min) 9 min  -WK      PT Received On 03/19/24  -WK          Time Calculation- PT    Total Timed Code Minutes- PT 9 minute(s)  -WK         Timed Charges    26890 - Gait Training Minutes  9  -WK         Total Minutes    Timed Charges Total Minutes 9  -WK       Total Minutes 9  -WK                User Key  (r) = Recorded By, (t) = Taken By, (c) = Cosigned By      Initials Name Provider Type    WK Loreta Zamudio PTA Physical Therapist Assistant                  Therapy Charges for Today       Code Description Service Date Service Provider Modifiers Qty    71619663671 HC GAIT TRAINING EA 15 MIN 3/19/2024 Loreta Zamudio PTA GP 1            PT G-Codes  Outcome Measure Options: AM-PAC 6 Clicks Basic Mobility (PT)  AM-PAC 6 Clicks Score (PT): 23    Loreta Zamuido PTA  3/19/2024

## 2024-03-19 NOTE — CASE MANAGEMENT/SOCIAL WORK
New walking oximetry is available. MSW notified Chichi Cohen with [employer]. Chichi has access to Epic and can pull from chart. No other needs identified.

## 2024-03-19 NOTE — PLAN OF CARE
Goal Outcome Evaluation:  Plan of Care Reviewed With: patient           Outcome Evaluation: Sit to stand SPV. Pt amb 200' SBA with room air -3L o2 with RT monitoring. Pt desat to 87 % and 3 L o2 applied. Pt performed sitting rest break and o2 desat to 83% with cues for breathing. Educated on safety with o2 desat with activity at home and ways to improve and attempt to prevent desat.

## 2024-03-19 NOTE — PLAN OF CARE
Goal Outcome Evaluation: Patient is being discharged this shift. Discharge instructions provided voiced understanding no voiced questions or concerns.

## 2024-03-19 NOTE — THERAPY DISCHARGE NOTE
Acute Care - Physical Therapy Discharge Summary  [facility]       Patient Name: Kolby Michel  : 1955  MRN: [id number]    Today's Date: 3/19/2024                 Admit Date: 3/11/2024      PT Recommendation and Plan    Visit Dx:    ICD-10-CM ICD-9-CM   1. Dysphagia, unspecified type [R13.10]  R13.10 787.20   2. Impaired mobility [Z74.09]  Z74.09 799.89   3. Acute respiratory failure with hypoxemia  J96.01 518.81   4. Obesity (BMI 30-39.9)  E66.9 278.00        Outcome Measures       Row Name 24 0951 24 1031          How much help from another person do you currently need...    Turning from your back to your side while in flat bed without using bedrails? 4  -WK 4  -LEXII     Moving from lying on back to sitting on the side of a flat bed without bedrails? 4  -WK 4  -LEXII     Moving to and from a bed to a chair (including a wheelchair)? 4  -WK 4  -LEXII     Standing up from a chair using your arms (e.g., wheelchair, bedside chair)? 4  -WK 4  -LEXII     Climbing 3-5 steps with a railing? 3  -WK 3  -LEXII     To walk in hospital room? 4  -WK 4  -LEXII     AM-PAC 6 Clicks Score (PT) 23  -WK 23  -LEXII     Highest Level of Mobility Goal 7 --> Walk 25 feet or more  -WK 7 --> Walk 25 feet or more  -LEXII        Functional Assessment    Outcome Measure Options AM-PAC 6 Clicks Basic Mobility (PT)  -WK AM-PAC 6 Clicks Basic Mobility (PT)  -LEXII               User Key  (r) = Recorded By, (t) = Taken By, (c) = Cosigned By      Initials Name Provider Type    Victorino Du PTA Physical Therapist Assistant    WK Loreta Zamudio PTA Physical Therapist Assistant                     PT Charges       Row Name 24 1007             Time Calculation    Start Time 0951  -WK      Stop Time 1000  -WK      Time Calculation (min) 9 min  -WK      PT Received On 24  -WK         Time Calculation- PT    Total Timed Code Minutes- PT 9 minute(s)  -WK         Timed Charges    77679 - Gait Training Minutes  9  -WK         Total Minutes     Timed Charges Total Minutes 9  -WK       Total Minutes 9  -WK                User Key  (r) = Recorded By, (t) = Taken By, (c) = Cosigned By      Initials Name Provider Type    WK Loreta Zamudio PTA Physical Therapist Assistant                     PT Rehab Goals       Row Name 03/19/24 1528             Gait Training Goal 1 (PT)    Activity/Assistive Device (Gait Training Goal 1, PT) gait (walking locomotion);improve balance and speed;increase endurance/gait distance;increase energy conservation  -      [place] Level (Gait Training Goal 1, PT) independent  -AH      Distance (Gait Training Goal 1, PT) 500'  -AH      Time Frame (Gait Training Goal 1, PT) long term goal (LTG);10 days  -AH      Progress/Outcome (Gait Training Goal 1, PT) goal not met  -         Stairs Goal 1 (PT)    Activity/Assistive Device (Stairs Goal 1, PT) ascending stairs;descending stairs;using handrail, left;using handrail, right;improve balance and speed  -      [place] Level/Cues Needed (Stairs Goal 1, PT) modified independence  -AH      Number of Stairs (Stairs Goal 1, PT) 4  -AH      Time Frame (Stairs Goal 1, PT) long term goal (LTG);10 days  -AH      Progress/Outcome (Stairs Goal 1, PT) goal not met  -                User Key  (r) = Recorded By, (t) = Taken By, (c) = Cosigned By      Initials Name Provider Type Valerie Wynn PTA Physical Therapist Assistant PT                        PT Discharge Summary  Reason for Discharge: Discharge from facility  Outcomes Achieved: Refer to plan of care for updates on goals achieved  Discharge Destination: Home      Valerie Steinberg PTA   3/19/2024

## 2024-03-19 NOTE — CARE COORDINATION
Spoke to patient.  Patient just got home from hospital, stated had PNA and some other stuff going on. Patient started at the ED in [facility] and transferred to [facility]. He said focus was on Lasix to get rid of fluid and steroids. He has a list of things to do from the hospital. He reported good appetite even at the hospital. He denied any concerns with BM, stated has daily. He is urinating quite a bit, took his last Lasix dose today at the hospital.  Patient reported he typically saturates in the upper 80s on room air, this was his baseline prior to his IP stay. Pt now has home oxygen from [facility] but stated was not given specific guidelines for when to use it. Patient stated he will go back to work next week and likely will not need it by then. Patient has an oximeter and reported during call that his SpO2 is in the upper 80s. Patient denied any symptoms, stated he feels very well.  - Discussed use of oxygen for saturation below 90%, that this is typical break point to use O2. Encouraged to monitor for s/s of SoB, chest tightness, light headedness, feeling faint, weak, confused. Patient has good self-awareness and is familiar with self-monitoring, use of oximeter.  - Reviewed medications. Pt has Prednisone and Doxycycline. He confirmed his other maintenance meds except his Zestoretic, which he is not sure of.  Encouraged pt to check his meds, verify whether he is taking this medication. Pt voiced understanding.  - Reviewed hospital follow up with provider on 3/21/24. Pt voiced understanding.  Patient agreed to f/u call with AC within next 7 days.  Electronically signed by Melanie Crump RN on 3/19/2024 at 1:31 PM

## 2024-03-20 NOTE — OUTREACH NOTE
Prep Survey      Flowsheet Row Responses   [facility] facility patient discharged from? [place]   Is LACE score < 7 ? No   Eligibility Readm Mgmt   Discharge diagnosis Acute respiratory failure with hypoxemia,    Atypical pneumonia   Does the patient have one of the following disease processes/diagnoses(primary or secondary)? Pneumonia   Does the patient have Home health ordered? No   Is there a DME ordered? Yes   What DME was ordered? [facility] - PAD-02   Prep survey completed? Yes            Lizz GIBSON - Registered Nurse

## 2024-03-21 ENCOUNTER — OFFICE VISIT (OUTPATIENT)
Dept: FAMILY MEDICINE CLINIC | Age: 69
End: 2024-03-21

## 2024-03-21 VITALS
HEIGHT: 73 IN | TEMPERATURE: 97.7 F | BODY MASS INDEX: 39.84 KG/M2 | WEIGHT: 300.6 LBS | OXYGEN SATURATION: 94 % | DIASTOLIC BLOOD PRESSURE: 58 MMHG | HEART RATE: 90 BPM | SYSTOLIC BLOOD PRESSURE: 130 MMHG

## 2024-03-21 DIAGNOSIS — J18.9 PNEUMONIA DUE TO INFECTIOUS ORGANISM, UNSPECIFIED LATERALITY, UNSPECIFIED PART OF LUNG: ICD-10-CM

## 2024-03-21 DIAGNOSIS — E11.40 TYPE 2 DIABETES MELLITUS WITH DIABETIC NEUROPATHY, WITHOUT LONG-TERM CURRENT USE OF INSULIN (HCC): ICD-10-CM

## 2024-03-21 DIAGNOSIS — J96.21 ACUTE ON CHRONIC RESPIRATORY FAILURE WITH HYPOXEMIA (HCC): Primary | ICD-10-CM

## 2024-03-21 DIAGNOSIS — Z09 HOSPITAL DISCHARGE FOLLOW-UP: ICD-10-CM

## 2024-03-21 ASSESSMENT — ENCOUNTER SYMPTOMS
CONSTIPATION: 0
NAUSEA: 0
COUGH: 0
RHINORRHEA: 0
VOMITING: 0
SHORTNESS OF BREATH: 1
BACK PAIN: 0

## 2024-03-21 NOTE — PROGRESS NOTES
icterus.        Right eye: No discharge.         Left eye: No discharge.      Conjunctiva/sclera: Conjunctivae normal.   Neck:      Trachea: No tracheal deviation.   Cardiovascular:      Rate and Rhythm: Normal rate and regular rhythm.      Heart sounds: Normal heart sounds. No murmur heard.     No friction rub. No gallop.   Pulmonary:      Effort: Pulmonary effort is normal. No respiratory distress.      Breath sounds: No wheezing or rales.   Abdominal:      General: Bowel sounds are normal. There is no distension.      Palpations: Abdomen is soft.      Tenderness: There is no abdominal tenderness.   Musculoskeletal:         General: No deformity (No gross deformities of upper or lower extremities) or signs of injury. Normal range of motion.      Cervical back: Normal range of motion and neck supple. No muscular tenderness.      Right lower leg: No edema.      Left lower leg: No edema.   Lymphadenopathy:      Cervical: No cervical adenopathy.   Skin:     General: Skin is warm and dry.      Findings: No erythema or rash.   Neurological:      Mental Status: He is alert and oriented to person, place, and time.      Cranial Nerves: No cranial nerve deficit.   Psychiatric:         Mood and Affect: Mood normal.         Behavior: Behavior normal.         Thought Content: Thought content normal.           An electronic signature was used to authenticate this note.  --SWETAH ZIMMERMAN MD

## 2024-03-22 ENCOUNTER — NURSE ONLY (OUTPATIENT)
Dept: FAMILY MEDICINE CLINIC | Age: 69
End: 2024-03-22

## 2024-03-22 ENCOUNTER — TELEPHONE (OUTPATIENT)
Dept: FAMILY MEDICINE CLINIC | Age: 69
End: 2024-03-22

## 2024-03-22 DIAGNOSIS — Z13.9 SCREENING DUE: Primary | ICD-10-CM

## 2024-03-22 SDOH — ECONOMIC STABILITY: FOOD INSECURITY: WITHIN THE PAST 12 MONTHS, THE FOOD YOU BOUGHT JUST DIDN'T LAST AND YOU DIDN'T HAVE MONEY TO GET MORE.: NEVER TRUE

## 2024-03-22 SDOH — ECONOMIC STABILITY: FOOD INSECURITY: WITHIN THE PAST 12 MONTHS, YOU WORRIED THAT YOUR FOOD WOULD RUN OUT BEFORE YOU GOT MONEY TO BUY MORE.: NEVER TRUE

## 2024-03-22 SDOH — ECONOMIC STABILITY: INCOME INSECURITY: HOW HARD IS IT FOR YOU TO PAY FOR THE VERY BASICS LIKE FOOD, HOUSING, MEDICAL CARE, AND HEATING?: NOT HARD AT ALL

## 2024-03-22 NOTE — PROGRESS NOTES
Alea Gary Social Determinants Of Health (Sdoh) Screening Questionnaire    Question 3/21/2024  6:05 PM CDT - Filed by Patient   How hard is it for you to pay for the very basics like food, housing, medical care, and heating? Not hard at all   Within the past 12 months, you worried that your food would run out before you got the money to buy more. Never true   Within the past 12 months, the food you bought just didn’t last and you didn’t have money to get more. Never true   In the past 12 months, has lack of transportation kept you from meetings, work, or from getting things needed for daily living? No   In the last 12 months, was there a time when you did not have a steady place to sleep or slept in a shelter (including now)? No   Would you like resources for any of these topics? No, thank you

## 2024-03-22 NOTE — TELEPHONE ENCOUNTER
Ligia at [facility] sleep medicine called saying there was no sleep medicine provider in [place]. Patient was referred when he was in the hospital at [facility]. I called patient to inform him of the message. Patient said he was told when he was in the hospital that he needed to have a sleep study done later on. Patient said he wants to wait on this referral at the moment. He said he has a lot of other things to take care of before going to have sleep study done.

## 2024-03-25 ENCOUNTER — CARE COORDINATION (OUTPATIENT)
Dept: CARE COORDINATION | Age: 69
End: 2024-03-25

## 2024-03-25 NOTE — CARE COORDINATION
[facility] He is taking a steroid taper, stated currently on 3 tablets per day, then tapering down to 2, then 1 and will complete at the end of the week. His blood sugar has been very elevated which patient attributes to the steroid. He will follow up with his Pulmonologist on 3/28 and hopes to return to work next Monday. Patient stated he feels well and denied any concerns during call. Patient intends to call his PCP for appt in about 3 weeks.  - Advised pt to contact PCP sooner than 3 weeks if his blood sugar remains very elevated after finishing his steroid. Elevated sugar can impact vision, other organs, circulation and it's important to keep it well controlled to protect his overall health. Patient voiced understanding.  - Encouraged [facility] if he has new concerns or needs for support. Patient voiced understanding and [facility] to check on him.  Electronically signed by Melanie Crump RN on 3/25/2024 at 4:13 PM

## 2024-03-26 ENCOUNTER — READMISSION MANAGEMENT (OUTPATIENT)
Dept: CALL CENTER | Facility: HOSPITAL | Age: 69
End: 2024-03-26
Payer: COMMERCIAL

## 2024-03-26 NOTE — OUTREACH NOTE
COPD/PN Week 1 Survey      Flowsheet Row Responses   [facility] facility patient discharged from? [place]   Does the patient have one of the following disease processes/diagnoses(primary or secondary)? Pneumonia   Week 1 attempt successful? No   Unsuccessful attempts Attempt 1            ALENA CHAN - Registered Nurse

## 2024-03-28 ENCOUNTER — OFFICE VISIT (OUTPATIENT)
Dept: PULMONOLOGY | Facility: CLINIC | Age: 69
End: 2024-03-28
Payer: MEDICARE

## 2024-03-28 VITALS
HEIGHT: 73 IN | SYSTOLIC BLOOD PRESSURE: 148 MMHG | WEIGHT: 298.6 LBS | BODY MASS INDEX: 39.57 KG/M2 | OXYGEN SATURATION: 94 % | DIASTOLIC BLOOD PRESSURE: 74 MMHG | HEART RATE: 108 BPM

## 2024-03-28 DIAGNOSIS — Z87.01 HISTORY OF PNEUMONIA: ICD-10-CM

## 2024-03-28 DIAGNOSIS — J96.11 CHRONIC RESPIRATORY FAILURE WITH HYPOXIA: ICD-10-CM

## 2024-03-28 DIAGNOSIS — Z86.16 HISTORY OF COVID-19: Primary | ICD-10-CM

## 2024-03-28 RX ORDER — GLIPIZIDE 10 MG/1
10 TABLET ORAL
COMMUNITY
Start: 2024-03-27

## 2024-03-28 NOTE — PROGRESS NOTES
" JUSTYNA Welch  [facility]   Pulmonary and Critical Care  [address]  [place] [address]  Phone: [phone]  Fax: [phone]           Chief Complaint  No chief complaint on file.    Subjective    History of Present Illness     Kolby Michel presents to [facility] PULMONARY & CRITICAL CARE MEDICINE   History of Present Illness  Mr. Michel is a 69-year-old male who is here for hospital follow-up.  He was seen inpatient by our service last month where he was treated for acute respiratory failure with hypoxia and considerations for possible long COVID (positive COVID February 13), pneumonia, hyponatremia, atelectasis (acute that may have accounted for part or most of the hypoxia) and leukocytosis.  He presented with symptoms of shortness of breath, hypoxia, nausea, and vomiting and diarrhea.  He also was on supplemental oxygen following a bout of COVID-19 last year.  He was treated with high flow nasal cannula and BiPAP overnight.  He was treated with azithromycin, ceftriaxone, steroids.He was discharged on 3 L of nasal cannula oxygen at rest and 4 L with exertion.  Today he reports his dyspnea is improved.  He has a cough that is absent.  He denies fever, chills or night sweats. His oxygen saturation was 82% on room air when he ambulated back to the office.  He was placed on our supplemental oxygen until he recovered with a sat of 93%.  Show me his documentation while wearing his supplemental oxygen his O2 sat has primarily been in the low 90s.  He has never had a pulmonary function study. He has never had a sleep study.                                                             Objective   Vital Signs:   /74   Pulse 108   Ht 185.4 cm (73\")   Wt 135 kg (298 lb 9.6 oz)   SpO2 94% Comment: 3L  BMI 39.40 kg/m²     Physical Exam  Vitals reviewed.   Constitutional:       Appearance: Normal appearance. He is obese.   Cardiovascular:      Rate and Rhythm: " Normal rate and regular rhythm.   Pulmonary:      Effort: Pulmonary effort is normal.      Breath sounds: Normal breath sounds.   Neurological:      General: No focal deficit present.      Mental Status: He is alert and oriented to person, place, and time.   Psychiatric:         Mood and Affect: Mood normal.         Behavior: Behavior normal.          Result Review :  The following data was reviewed by: JUSTYNA Welch on 03/28/2024:      My interpretation of imaging: No new  My interpretation of labs: No new      My interpretation of the PFT : None    No results found for this or any previous visit.        Assessment and Plan   Diagnoses and all orders for this visit:    1. History of COVID-19 (Primary)    2. History of pneumonia    3. Chronic respiratory failure with hypoxia  -     Alpha - 1 - Antitrypsin; Future  -     Spirometry with Diffusion Capacity; Future  -     Overnight Sleep Oximetry Study; Future      Overall patient is feeling much improved from his hospital stay where he was treated for pneumonia.  He continues to have hypoxia.  He is encouraged at this time to continue his supplemental oxygen.  He was hoping to be able to come off of his oxygen and return to work.  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  He will continue to keep a watch on his oxygen saturation checking it at times with ambulation and on room air.  He is encouraged that should he be able to maintain a sat of 92% or above on room air to contact the office and we will schedule him for a walking oximetry to make it official for discontinuation of supplemental oxygen.  I have otherwise encouraged him to continue using his incentive spirometer.  He has 1 more day left of his prednisone and will complete that.  I have asked him to return in 4 weeks with a flow-volume loop and diffusion capacity.  We will also check an overnight on  room air to further assess his nocturnal oxygen needs as well as see what his PHOEBE is.  If his PHOEBE is elevated we discussed possible need for sleep study and we will check an [place] on him at that time.  Patient is present today with his wife and both are agreeable to the plan.    LDCT: Does not qualify  Smoking Cessation: Never smoker  Vaccinations: Flu: Done  PNA: Done           Follow Up   Return in about 4 weeks (around 4/25/2024) for FVL w DIF, Overnight.  Patient was given instructions and counseling regarding his condition or for health maintenance advice. Please see specific information pulled into the AVS if appropriate.     Mayra Weeks, APRN  3/28/2024  19:11 CDT

## 2024-04-03 ENCOUNTER — READMISSION MANAGEMENT (OUTPATIENT)
Dept: CALL CENTER | Facility: HOSPITAL | Age: 69
End: 2024-04-03
Payer: COMMERCIAL

## 2024-04-03 NOTE — OUTREACH NOTE
COPD/PN Week 3 Survey      Flowsheet Row Responses   [facility] patient discharged from? [place]   Does the patient have one of the following disease processes/diagnoses(primary or secondary)? Pneumonia   Week 3 attempt successful? Yes   Call start time 1703   Call end time 1705   Discharge diagnosis Acute respiratory failure with hypoxemia,    Atypical pneumonia   Person spoke with today (if not patient) and relationship pt   Does the patient have a primary care provider?  Yes   Does the patient have an appointment with their PCP or specialist within 7 days of discharge? Yes   Has the patient kept scheduled appointments due by today? Yes   Pulse Ox monitoring Intermittent   Pulse Ox device source Patient   O2 Sat: education provided Sat levels, Monitoring frequency, When to seek care   O2 Sat education comments 90-92   Psychosocial issues? No   Did the patient receive a copy of their discharge instructions? Yes   Nursing interventions Reviewed instructions with patient   What is the patient's perception of their health status since discharge? Improving   Nursing Interventions Nurse provided patient education   Are the patient's immunizations up to date?  Yes   Nursing interventions Educated on importance of maintaining up to date immunizations as advised by provider   If the patient is a current smoker, are they able to teach back resources for cessation? Not a smoker   Is the patient/caregiver able to teach back the hierarchy of who to call/visit for symptoms/problems? PCP, Specialist, Home health nurse, Urgent Care, ED, 911 Yes   Is the patient/caregiver able to teach back signs and symptoms of worsening condition: Fever/chills, Shortness of breath, Chest pain   Is the patient/caregiver able to teach back importance of completing antibiotic course of treatment? Yes   Week 3 call completed? Yes   Graduated Yes   Is the patient interested in additional calls from an ambulatory ? No   Would this  patient benefit from a Referral to [facility] Social Work? No   Wrap up additional comments pt stated  he is doing wonderfully. no questions/concerns   Call end time 1705            LALO GURROLA - Registered Nurse

## 2024-04-09 DIAGNOSIS — J96.11 CHRONIC RESPIRATORY FAILURE WITH HYPOXIA: ICD-10-CM

## 2024-04-12 DIAGNOSIS — J96.11 CHRONIC RESPIRATORY FAILURE WITH HYPOXIA: ICD-10-CM

## 2024-04-16 ENCOUNTER — TELEPHONE (OUTPATIENT)
Dept: PULMONOLOGY | Facility: CLINIC | Age: 69
End: 2024-04-16
Payer: COMMERCIAL

## 2024-04-16 NOTE — TELEPHONE ENCOUNTER
Patient will discuss sleep study at his next office visit.                                               [place] Sleepiness Scale    Situation Chance of Dozing or Sleeping  Sitting and reading 1 - slight chance of dosing or sleeping  Watching TV 1 - slight chance of dosing or sleeping  Sitting inactive in a public place 0 - would never dose or sleep  Being a passenger in a motor vehicle for an hour or more 0 - would never dose or sleep  Lying down in the afternoon 0 - would never dose or sleep  Sitting and talking to someone 1 - slight chance of dosing or sleeping  Sitting quietly after lunch (no alcohol) 0 - would never dose or sleep  Stopped for a few minutes in traffic while driving 0 - would never dose or sleep  Total score (add the scores up) 3      SLEEP RELATED SYMPTOMS:   Non-restorative sleep, Frequent awakenings, Leg/body jerks during sleep, Nocturia (frequent urination at night), Unable to stay asleep, and Unable to fall asleep

## 2024-04-23 NOTE — PROGRESS NOTES
" JUSTYNA Welch  [facility]   Pulmonary and Critical Care  [address] [place] [address]  Phone: [phone]  Fax: [phone]           Chief Complaint  History of COVID-19    Subjective    History of Present Illness     Kolby Michel presents to [facility] PULMONARY & CRITICAL CARE MEDICINE   History of Present Illness  Mr. Michel is a 69-year-old male who has known acute respiratory failure with hypoxia and considerations for possible long COVID (positive COVID February 13), pneumonia, hyponatremia, atelectasis (acute that may have accounted for part or most of the hypoxia) and leukocytosis. He was seen at last visit post hospitalization. He was discharged on 3 L of nasal cannula oxygen at rest and 4 L with exertion and was kept on oxygen at last visit. Today he reports his dyspnea is improved.  He has a cough that is absent.  He denies fever, chills or night sweats.   His FVL and DLCO today have shown severe restriction and normal diffusion when corrected for alveolar volume. His overnight showed <88% for 4 hours and 39 minutes. His PHOEBE was 56 however he wanted to discuss sleep study today with en [place] score of 3.  Alpha 1 Antitrypsin was normal. 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            Objective   Vital Signs:   /70   Pulse 95   Ht 182.9 cm (72\")   Wt (!) 137 kg (303 lb)   SpO2 94% Comment: 2.5L  BMI 41.09 kg/m²     Physical Exam  Vitals reviewed.   Constitutional:       Appearance: Normal appearance. He is morbidly obese.   Cardiovascular:      Rate and Rhythm: Normal rate and regular rhythm.   Pulmonary:      Effort: Pulmonary effort is normal.      Breath sounds: Normal breath sounds.   Neurological:      General: No focal deficit present.     "  Mental Status: He is alert and oriented to person, place, and time.   Psychiatric:         Mood and Affect: Mood normal.         Behavior: Behavior normal.          Result Review :  The following data was reviewed by: JUSTYNA Welch on 04/29/2024:    My interpretation of imaging:  no new   My interpretation of labs: no new     PFT Values          4/29/2024    14:30   Pre Drug PFT Results   FVC 47   FEV1 48   FEF 25-75% 51   FEV1/FVC 77   Other Tests PFT Results   DLCO 68   D/VAsb 117     My interpretation of the PFT : as above     Results for orders placed in visit on 04/29/24    Spirometry with Diffusion Capacity    Narrative  Spirometry with Diffusion Capacity    Performed by: Winter Watson, RRT  Authorized by: Mayra Weeks APRN  Pre Drug % Predicted  FVC: 47%  FEV1: 48%  FEF 25-75%: 51%  FEV1/FVC: 77%  DLCO: 68%  D/VAsb: 117%    Interpretation  Spirometry  Spirometry shows severe restriction. There is reduced midflow suggesting small airway/airflow obstruction.  Diffusion Capacity  The patient's diffusion capacity is mildly reduced.  Diffusion capacity is normal when corrected for alveolar volume.  Overall comments: No prior to compare        Assessment and Plan   Diagnoses and all orders for this visit:    1. Chronic respiratory failure with hypoxia (Primary)    2. History of COVID-19    3. History of pneumonia    4. Dependence on supplemental oxygen    5. Chemical exposure  -     CT Chest Hi Resolution Diagnostic; Future    6. Occupational exposure to coal dust  -     CT Chest Hi Resolution Diagnostic; Future    7. Restrictive lung disease  -     CT Chest Hi Resolution Diagnostic; Future     8. Resin exposure  9. Fiberglass exposure     We reviewed his PFT showing severe restriction and normal diffusion capacity. He notes his O2 sat has been 90-92% at rest but any exertion and he does drop with quick recovery. We reviewed his overnight results as well. He would like to think about  a home sleep study. He does not sleep anywhere but home. We discussed the risks of untreated sleep apnea. He would like to see what the HRCT shows first. We will obtain a high res CT given severe restriction on PFT as well as known exposures as noted above. We did discuss the possibility of the restriction being related to his weight / abdominal girth as well.     Alpha 1: Normal, MM  LDCT: does not qualify  Smoking Cessation: never smoker   Vaccinations: Flu: Due in fall 2024 PNA:         Follow Up   Return in about 6 weeks (around 6/10/2024) for CT.  Patient was given instructions and counseling regarding his condition or for health maintenance advice. Please see specific information pulled into the AVS if appropriate.     Mayra Weeks, JUSTYNA  4/29/2024  15:11 CDT

## 2024-04-25 PROBLEM — J96.11 CHRONIC RESPIRATORY FAILURE WITH HYPOXIA: Chronic | Status: ACTIVE | Noted: 2024-04-25

## 2024-04-25 PROBLEM — Z99.81 DEPENDENCE ON SUPPLEMENTAL OXYGEN: Chronic | Status: ACTIVE | Noted: 2024-04-25

## 2024-04-29 ENCOUNTER — PROCEDURE VISIT (OUTPATIENT)
Dept: PULMONOLOGY | Facility: CLINIC | Age: 69
End: 2024-04-29
Payer: MEDICARE

## 2024-04-29 ENCOUNTER — OFFICE VISIT (OUTPATIENT)
Dept: PULMONOLOGY | Facility: CLINIC | Age: 69
End: 2024-04-29
Payer: MEDICARE

## 2024-04-29 VITALS
HEIGHT: 72 IN | DIASTOLIC BLOOD PRESSURE: 70 MMHG | WEIGHT: 303 LBS | BODY MASS INDEX: 41.04 KG/M2 | OXYGEN SATURATION: 94 % | HEART RATE: 95 BPM | SYSTOLIC BLOOD PRESSURE: 128 MMHG

## 2024-04-29 DIAGNOSIS — Z57.2 OCCUPATIONAL EXPOSURE TO COAL DUST: ICD-10-CM

## 2024-04-29 DIAGNOSIS — J96.11 CHRONIC RESPIRATORY FAILURE WITH HYPOXIA: Primary | ICD-10-CM

## 2024-04-29 DIAGNOSIS — J98.4 RESTRICTIVE LUNG DISEASE: ICD-10-CM

## 2024-04-29 DIAGNOSIS — Z86.16 HISTORY OF COVID-19: ICD-10-CM

## 2024-04-29 DIAGNOSIS — Z77.098 CHEMICAL EXPOSURE: ICD-10-CM

## 2024-04-29 DIAGNOSIS — J96.11 CHRONIC RESPIRATORY FAILURE WITH HYPOXIA: ICD-10-CM

## 2024-04-29 DIAGNOSIS — Z99.81 DEPENDENCE ON SUPPLEMENTAL OXYGEN: Chronic | ICD-10-CM

## 2024-04-29 DIAGNOSIS — Z87.01 HISTORY OF PNEUMONIA: ICD-10-CM

## 2024-04-29 PROCEDURE — 3078F DIAST BP <80 MM HG: CPT | Performed by: NURSE PRACTITIONER

## 2024-04-29 PROCEDURE — 94010 BREATHING CAPACITY TEST: CPT | Performed by: NURSE PRACTITIONER

## 2024-04-29 PROCEDURE — 1159F MED LIST DOCD IN RCRD: CPT | Performed by: NURSE PRACTITIONER

## 2024-04-29 PROCEDURE — 99214 OFFICE O/P EST MOD 30 MIN: CPT | Performed by: NURSE PRACTITIONER

## 2024-04-29 PROCEDURE — 1160F RVW MEDS BY RX/DR IN RCRD: CPT | Performed by: NURSE PRACTITIONER

## 2024-04-29 PROCEDURE — 94729 DIFFUSING CAPACITY: CPT | Performed by: NURSE PRACTITIONER

## 2024-04-29 PROCEDURE — 3074F SYST BP LT 130 MM HG: CPT | Performed by: NURSE PRACTITIONER

## 2024-04-29 SDOH — HEALTH STABILITY - PHYSICAL HEALTH: OCCUPATIONAL EXPOSURE TO DUST: Z57.2

## 2024-04-29 NOTE — PROCEDURES
Spirometry with Diffusion Capacity    Performed by: Winter Watson, RRT  Authorized by: Mayra Weeks APRN     Pre Drug % Predicted    FVC: 47%   FEV1: 48%   FEF 25-75%: 51%   FEV1/FVC: 77%   DLCO: 68%   D/VAsb: 117%    Interpretation   Spirometry   Spirometry shows severe restriction. There is reduced midflow suggesting small airway/airflow obstruction.   Diffusion Capacity  The patient's diffusion capacity is mildly reduced.  Diffusion capacity is normal when corrected for alveolar volume.   Overall comments: No prior to compare

## 2024-05-09 DIAGNOSIS — I10 BENIGN ESSENTIAL HTN: ICD-10-CM

## 2024-05-10 ENCOUNTER — TELEPHONE (OUTPATIENT)
Dept: PULMONOLOGY | Facility: CLINIC | Age: 69
End: 2024-05-10
Payer: COMMERCIAL

## 2024-05-10 RX ORDER — AMLODIPINE BESYLATE 5 MG/1
5 TABLET ORAL NIGHTLY
Qty: 90 TABLET | Refills: 1 | Status: SHIPPED | OUTPATIENT
Start: 2024-05-10

## 2024-05-29 ENCOUNTER — HOSPITAL ENCOUNTER (OUTPATIENT)
Dept: CT IMAGING | Facility: HOSPITAL | Age: 69
Discharge: HOME | End: 2024-05-29
Admitting: NURSE PRACTITIONER
Payer: MEDICARE

## 2024-05-29 DIAGNOSIS — J98.4 RESTRICTIVE LUNG DISEASE: ICD-10-CM

## 2024-05-29 DIAGNOSIS — Z77.098 CHEMICAL EXPOSURE: ICD-10-CM

## 2024-05-29 DIAGNOSIS — Z57.2 OCCUPATIONAL EXPOSURE TO COAL DUST: ICD-10-CM

## 2024-05-29 PROCEDURE — 71250 CT THORAX DX C-: CPT

## 2024-05-29 SDOH — HEALTH STABILITY - PHYSICAL HEALTH: OCCUPATIONAL EXPOSURE TO DUST: Z57.2

## 2024-05-29 NOTE — PROGRESS NOTES
Spoke with patient and relayed test results. Patient voiced understanding. Left voicemail at Dr. Mccann's for his nurse stating we are faxing the CT report and that Karly JANSEN would like for Dr. Mccann to follow up on the thyroid nodule.    Faxed to [phone].

## 2024-06-02 DIAGNOSIS — E78.5 HYPERLIPIDEMIA, UNSPECIFIED HYPERLIPIDEMIA TYPE: ICD-10-CM

## 2024-06-03 DIAGNOSIS — E78.5 HYPERLIPIDEMIA, UNSPECIFIED HYPERLIPIDEMIA TYPE: ICD-10-CM

## 2024-06-03 RX ORDER — ROSUVASTATIN CALCIUM 10 MG/1
TABLET, COATED ORAL
Qty: 90 TABLET | Refills: 0 | Status: SHIPPED | OUTPATIENT
Start: 2024-06-03

## 2024-06-03 RX ORDER — ROSUVASTATIN CALCIUM 10 MG/1
TABLET, COATED ORAL
Qty: 90 TABLET | Refills: 0 | OUTPATIENT
Start: 2024-06-03

## 2024-06-04 ENCOUNTER — TELEPHONE (OUTPATIENT)
Dept: FAMILY MEDICINE CLINIC | Age: 69
End: 2024-06-04

## 2024-06-04 DIAGNOSIS — E04.1 THYROID NODULE: Primary | ICD-10-CM

## 2024-06-04 NOTE — TELEPHONE ENCOUNTER
Called patient to discuss the CT results sent to us fo incidental thyroid nodule found on CT chest at  Imaging on 5/29/24.     Per Dr. Figueroa, patient needs U/S thyroid.     Left VM for patient to call me back to discuss results and recommendations.

## 2024-06-05 ENCOUNTER — PATIENT MESSAGE (OUTPATIENT)
Dept: CARE COORDINATION | Age: 69
End: 2024-06-05

## 2024-06-06 ENCOUNTER — TRANSCRIBE ORDERS (OUTPATIENT)
Dept: ADMINISTRATIVE | Facility: HOSPITAL | Age: 69
End: 2024-06-06
Payer: COMMERCIAL

## 2024-06-06 DIAGNOSIS — E04.1 THYROID NODULE: Primary | ICD-10-CM

## 2024-06-06 NOTE — TELEPHONE ENCOUNTER
Patient called back and he wants the Ultrasound at [facility]. Order faxed to [facility]. Patient can call [facility] Scheduling at [phone] to schedule.

## 2024-06-06 NOTE — TELEPHONE ENCOUNTER
Called patient's work number and left him a voicemail asking if he was good with getting the thyroid ultrasound and if so, [place] or [place]. Asked him to call me and leave a voicemail with that info or he could send me a [employer] message.

## 2024-06-10 ENCOUNTER — HOSPITAL ENCOUNTER (OUTPATIENT)
Dept: GENERAL RADIOLOGY | Age: 69
Discharge: HOME | End: 2024-06-10
Payer: MEDICARE

## 2024-06-10 DIAGNOSIS — E04.1 THYROID NODULE: ICD-10-CM

## 2024-06-10 PROCEDURE — 76536 US EXAM OF HEAD AND NECK: CPT

## 2024-06-17 ENCOUNTER — PATIENT MESSAGE (OUTPATIENT)
Dept: FAMILY MEDICINE CLINIC | Age: 69
End: 2024-06-17

## 2024-06-17 DIAGNOSIS — R93.89 ABNORMAL THYROID ULTRASOUND: ICD-10-CM

## 2024-06-17 DIAGNOSIS — E04.1 THYROID NODULE: Primary | ICD-10-CM

## 2024-06-30 NOTE — PROGRESS NOTES
" JUSTYNA Welch  [facility]   Pulmonary and Critical Care  [address] [place] [address]  Phone: [phone]  Fax: [phone]           Chief Complaint  Chronic respiratory failure with hypoxia    Subjective    History of Present Illness     Kolby Michel presents to [facility] PULMONARY & CRITICAL CARE MEDICINE   History of Present Illness  Mr. Michel is a 69-year-old male who has restrictive lung disease (need lung volumes to confirm), chronic respiratory failure, history of COVID 19.  His initial visit in March was posthospitalization.  He was discharged home on supplemental oxygen.  Today he is on 3 liters per minute.  His spirometry showed severe restriction and a normal diffusion capacity when corrected for alveolar volume.  Given his known prior exposures and severe restriction he was sent for a high-res CT.  This did not show any pulmonary fibrosis.  He does have an elevated right diaphragm and distal airway restriction/bronchiolitis.  He was noted to have a thyroid nodule and results were sent to his primary care physician.  His overnight showed an PHOEBE of 56 and he was less than 88% for 4 hours and 39 minutes.  He was kept on nocturnal oxygen.  He wanted to wait till after his high-res CT to see about a sleep study.  His [place] score was 3. He has dyspnea that is better.  He has a cough that is absent.  He denies fever, chills or night sweats.           Objective   Vital Signs:   /68   Pulse 100   Ht 182.9 cm (72\")   Wt (!) 138 kg (303 lb 3.2 oz)   SpO2 92% Comment: 3L  BMI 41.12 kg/m²     Physical Exam  Vitals reviewed.   Constitutional:       Appearance: Normal appearance. He is morbidly obese.      Interventions: Nasal cannula in place.   Cardiovascular:      Rate and Rhythm: Normal rate and regular rhythm.   Pulmonary:      Effort: Pulmonary effort is normal.      Breath sounds: Normal breath sounds.   Neurological:      General: No " focal deficit present.      Mental Status: He is alert and oriented to person, place, and time.   Psychiatric:         Mood and Affect: Mood normal.         Behavior: Behavior normal.          Result Review :  The following data was reviewed by: JUSTYNA Welch on 07/03/2024:    Data reviewed : Radiologic studies HRCT    My interpretation of imaging:  as in HPI  My interpretation of labs: no new  CT Chest Hi Resolution Diagnostic (05/29/2024 08:07)   PFT Values          4/29/2024    14:30   Pre Drug PFT Results   FVC 47   FEV1 48   FEF 25-75% 51   FEV1/FVC 77   Other Tests PFT Results   DLCO 68   D/VAsb 117     My interpretation of the PFT : no new     Results for orders placed in visit on 04/29/24    Spirometry with Diffusion Capacity    Narrative  Spirometry with Diffusion Capacity    Performed by: Winter Watson, RRT  Authorized by: Mayra Weeks APRN  Pre Drug % Predicted  FVC: 47%  FEV1: 48%  FEF 25-75%: 51%  FEV1/FVC: 77%  DLCO: 68%  D/VAsb: 117%    Interpretation  Spirometry  Spirometry shows severe restriction. There is reduced midflow suggesting small airway/airflow obstruction.  Diffusion Capacity  The patient's diffusion capacity is mildly reduced.  Diffusion capacity is normal when corrected for alveolar volume.  Overall comments: No prior to compare          Assessment and Plan   Diagnoses and all orders for this visit:    1. Restrictive lung disease (Primary)  Comments:  need lung volumes to confirm. likely related to his weight and elevated right hemidiaphragm  Orders:  -     Spirometry with Diffusion Capacity & Lung Volumes; Future    2. Chronic respiratory failure with hypoxia  -     Spirometry with Diffusion Capacity & Lung Volumes; Future    3. Dependence on supplemental oxygen    4. Elevated diaphragm  Overview:  Right        We reviewed and discussed his high-res CT results.  No evidence of pulmonary fibrosis.  We discussed his elevated diaphragm.  We discussed the  atelectasis.  He will continue his supplemental oxygen at 2 L keeping his O2 sat above 90.  He is encouraged to increase his mobility and stay as active as he can.  We discussed that at this point I still strongly recommend a sleep study.  He is contraindicated to have a home sleep study given his chronic respiratory failure on oxygen.  He would like to discuss this further with his PCP.  I have otherwise asked him to return in 6 months and we will do a complete PFT in the office to obtain lung volumes.    Alpha 1: Normal, MM  LDCT: does not qualify  Smoking Cessation: never smoker   Vaccinations: Flu: Due in fall 2024 PNA: completed       Follow Up   Return in about 6 months (around 1/3/2025) for PFT complete in office .  Patient was given instructions and counseling regarding his condition or for health maintenance advice. Please see specific information pulled into the AVS if appropriate.     Mayra Weeks, APRN  7/3/2024  11:13 CDT

## 2024-07-03 ENCOUNTER — OFFICE VISIT (OUTPATIENT)
Dept: PULMONOLOGY | Facility: CLINIC | Age: 69
End: 2024-07-03
Payer: MEDICARE

## 2024-07-03 VITALS
WEIGHT: 303.2 LBS | BODY MASS INDEX: 41.07 KG/M2 | HEIGHT: 72 IN | DIASTOLIC BLOOD PRESSURE: 68 MMHG | OXYGEN SATURATION: 92 % | SYSTOLIC BLOOD PRESSURE: 158 MMHG | HEART RATE: 100 BPM

## 2024-07-03 DIAGNOSIS — Z99.81 DEPENDENCE ON SUPPLEMENTAL OXYGEN: Chronic | ICD-10-CM

## 2024-07-03 DIAGNOSIS — J96.11 CHRONIC RESPIRATORY FAILURE WITH HYPOXIA: Chronic | ICD-10-CM

## 2024-07-03 DIAGNOSIS — J98.6 ELEVATED DIAPHRAGM: Chronic | ICD-10-CM

## 2024-07-03 DIAGNOSIS — J98.4 RESTRICTIVE LUNG DISEASE: Primary | ICD-10-CM

## 2024-07-03 PROCEDURE — 3077F SYST BP >= 140 MM HG: CPT | Performed by: NURSE PRACTITIONER

## 2024-07-03 PROCEDURE — 3078F DIAST BP <80 MM HG: CPT | Performed by: NURSE PRACTITIONER

## 2024-07-03 PROCEDURE — 1159F MED LIST DOCD IN RCRD: CPT | Performed by: NURSE PRACTITIONER

## 2024-07-03 PROCEDURE — 99214 OFFICE O/P EST MOD 30 MIN: CPT | Performed by: NURSE PRACTITIONER

## 2024-07-03 PROCEDURE — 1160F RVW MEDS BY RX/DR IN RCRD: CPT | Performed by: NURSE PRACTITIONER

## 2024-07-06 DIAGNOSIS — G25.81 RESTLESS LEG SYNDROME: ICD-10-CM

## 2024-07-06 DIAGNOSIS — E11.40 TYPE 2 DIABETES MELLITUS WITH DIABETIC NEUROPATHY, WITHOUT LONG-TERM CURRENT USE OF INSULIN (HCC): ICD-10-CM

## 2024-07-08 RX ORDER — GABAPENTIN 600 MG/1
600 TABLET ORAL 2 TIMES DAILY
Qty: 60 TABLET | Refills: 2 | Status: SHIPPED | OUTPATIENT
Start: 2024-07-08 | End: 2024-10-06

## 2024-07-08 RX ORDER — PIOGLITAZONEHYDROCHLORIDE 30 MG/1
TABLET ORAL
Qty: 90 TABLET | Refills: 1 | Status: SHIPPED | OUTPATIENT
Start: 2024-07-08

## 2024-07-16 ENCOUNTER — OFFICE VISIT (OUTPATIENT)
Dept: ENT CLINIC | Age: 69
End: 2024-07-16
Payer: MEDICARE

## 2024-07-16 VITALS
WEIGHT: 302 LBS | DIASTOLIC BLOOD PRESSURE: 64 MMHG | BODY MASS INDEX: 40.02 KG/M2 | HEIGHT: 73 IN | SYSTOLIC BLOOD PRESSURE: 134 MMHG

## 2024-07-16 DIAGNOSIS — E66.01 SEVERE OBESITY (BMI 35.0-39.9) WITH COMORBIDITY (HCC): ICD-10-CM

## 2024-07-16 DIAGNOSIS — E04.1 THYROID NODULE: Primary | ICD-10-CM

## 2024-07-16 DIAGNOSIS — H61.23 BILATERAL IMPACTED CERUMEN: ICD-10-CM

## 2024-07-16 PROCEDURE — 99203 OFFICE O/P NEW LOW 30 MIN: CPT | Performed by: NURSE PRACTITIONER

## 2024-07-16 PROCEDURE — 3075F SYST BP GE 130 - 139MM HG: CPT | Performed by: NURSE PRACTITIONER

## 2024-07-16 PROCEDURE — 3078F DIAST BP <80 MM HG: CPT | Performed by: NURSE PRACTITIONER

## 2024-07-16 PROCEDURE — 1123F ACP DISCUSS/DSCN MKR DOCD: CPT | Performed by: NURSE PRACTITIONER

## 2024-07-16 PROCEDURE — 69210 REMOVE IMPACTED EAR WAX UNI: CPT | Performed by: NURSE PRACTITIONER

## 2024-07-16 NOTE — PROGRESS NOTES
2024    Sae Wilson (:  1955) is a 69 y.o. male, Established patient, here for evaluation of the following chief complaint(s):  New Patient (Thyroid nodule )      Vitals:    24 0817   BP: 134/64   Weight: (!) 137 kg (302 lb)   Height: 1.854 m (6' 1\")       Wt Readings from Last 3 Encounters:   24 (!) 137 kg (302 lb)   24 (!) 136.4 kg (300 lb 9.6 oz)   24 133.4 kg (294 lb)       BP Readings from Last 3 Encounters:   24 134/64   24 (!) 130/58   24 128/76         SUBJECTIVE/OBJECTIVE:    Patient seen today for his thyroid. He had a thyroid ultrasound done 6/10/24 that did show a thyroid nodule. He states that he had COVID a few years ago and has had problems with his lungs ever since. He states that he got a CT of his chest and that's how the nodule was initially found. He denies pain in the area or difficulty swallowing. He does feel like he has decreased hearing recently. He denies ear pain.      Review of Systems   Constitutional: Negative.    HENT:  Positive for hearing loss.    Eyes: Negative.    Respiratory: Negative.     Cardiovascular: Negative.    Gastrointestinal: Negative.    Endocrine: Negative.    Musculoskeletal: Negative.    Skin: Negative.    Allergic/Immunologic: Negative.    Neurological: Negative.    Hematological: Negative.    Psychiatric/Behavioral: Negative.          Physical Exam  Vitals reviewed.   Constitutional:       Appearance: Normal appearance. He is normal weight.   HENT:      Head: Normocephalic and atraumatic.      Right Ear: External ear normal. There is impacted cerumen.      Left Ear: External ear normal. There is impacted cerumen.      Nose: Nose normal.      Mouth/Throat:      Mouth: Mucous membranes are moist.   Eyes:      Extraocular Movements: Extraocular movements intact.      Pupils: Pupils are equal, round, and reactive to light.   Cardiovascular:      Rate and Rhythm: Normal rate and regular rhythm.   Pulmonary:

## 2024-07-21 DIAGNOSIS — E11.40 TYPE 2 DIABETES MELLITUS WITH DIABETIC NEUROPATHY, WITHOUT LONG-TERM CURRENT USE OF INSULIN (HCC): Primary | ICD-10-CM

## 2024-07-21 DIAGNOSIS — I10 BENIGN ESSENTIAL HTN: ICD-10-CM

## 2024-07-21 DIAGNOSIS — F32.A ANXIETY AND DEPRESSION: ICD-10-CM

## 2024-07-21 DIAGNOSIS — F41.9 ANXIETY AND DEPRESSION: ICD-10-CM

## 2024-07-22 RX ORDER — LISINOPRIL AND HYDROCHLOROTHIAZIDE 25; 20 MG/1; MG/1
TABLET ORAL
Qty: 90 TABLET | Refills: 0 | Status: SHIPPED | OUTPATIENT
Start: 2024-07-22

## 2024-07-22 RX ORDER — SERTRALINE HYDROCHLORIDE 100 MG/1
100 TABLET, FILM COATED ORAL DAILY
Qty: 90 TABLET | Refills: 0 | Status: SHIPPED | OUTPATIENT
Start: 2024-07-22

## 2024-07-22 NOTE — TELEPHONE ENCOUNTER
One fill only. Patient needs to schedule an appointment for an office visit and labs for additional refills.

## 2024-07-29 ENCOUNTER — TELEPHONE (OUTPATIENT)
Dept: PULMONOLOGY | Facility: CLINIC | Age: 69
End: 2024-07-29
Payer: COMMERCIAL

## 2024-07-29 NOTE — TELEPHONE ENCOUNTER
Noticed patient had appoitment on 7/31/24 for work paperwork,but was just in office and providers office notes stated to return 1/2025. 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 Karly stated that he may have to go to elsewhere to have this completed , patient will call me back tomorrow with more information then we will ask him if he can fax us the form first so Karly can look at it to determine if its something we can fill out.

## 2024-07-30 NOTE — TELEPHONE ENCOUNTER
I did have the front cancel the appoitment.   [Feeling Tired] : feeling tired [FreeTextEntry9] : much improved [de-identified] : Weakness R UE sudden onset 10/21 - pain resolved, strength not better  [de-identified] : improved  [de-identified] : no previous issues w/ cytopenia/ bleeding/ clotting dyscrasias -  [de-identified] : greatly improved w/ HRT  [As Noted in HPI] : as noted in HPI [Negative] : Gastrointestinal [Dry Eyes] : no dryness of the eyes [Sleep Disturbances] : no sleep disturbances [Hot Flashes] : no hot flashes [FreeTextEntry2] : worse off stimulant for logistical issues but sleeping better  [FreeTextEntry3] : Dry eyes- Resolved [FreeTextEntry4] : mild oral sicca [de-identified] : denies photosensitive rash or any rash

## 2024-07-30 NOTE — TELEPHONE ENCOUNTER
Hub staff attempted to follow warm transfer process and was unsuccessful     Caller: Kolby Michel    Relationship to patient: Self    Best call back number: [phone]   PT'S WORK PHONE     Patient is needing: PATIENT CALLED TO SPEAK WITH SOMEONE ABOUT THIS. HE WILL NEED A CALL BACK.

## 2024-07-30 NOTE — TELEPHONE ENCOUNTER
Called patient back he stated he would rather not fax it he has taken tomorrow off to take care of this, he will bring it by tomorrow and he knows that we may have to send him to occupational health to get this done and may not be seen but he wants to bring it by to see if Karly will, I encouraged him to fax the paper but he refused and stated he already took tomorrow off to take care of this.

## 2024-07-31 ENCOUNTER — TELEPHONE (OUTPATIENT)
Dept: PULMONOLOGY | Facility: CLINIC | Age: 69
End: 2024-07-31

## 2024-07-31 DIAGNOSIS — J96.11 CHRONIC RESPIRATORY FAILURE WITH HYPOXIA: Primary | ICD-10-CM

## 2024-07-31 DIAGNOSIS — Z57.2 OCCUPATIONAL EXPOSURE TO COAL DUST: ICD-10-CM

## 2024-07-31 DIAGNOSIS — J98.4 RESTRICTIVE LUNG DISEASE: ICD-10-CM

## 2024-07-31 DIAGNOSIS — Z87.01 HISTORY OF PNEUMONIA: ICD-10-CM

## 2024-07-31 DIAGNOSIS — Z99.81 DEPENDENCE ON SUPPLEMENTAL OXYGEN: ICD-10-CM

## 2024-07-31 DIAGNOSIS — Z77.098 CHEMICAL EXPOSURE: ICD-10-CM

## 2024-07-31 DIAGNOSIS — J98.6 ELEVATED DIAPHRAGM: ICD-10-CM

## 2024-07-31 SDOH — HEALTH STABILITY - PHYSICAL HEALTH: OCCUPATIONAL EXPOSURE TO DUST: Z57.2

## 2024-07-31 NOTE — TELEPHONE ENCOUNTER
Notified patient that referral was sent to Occupational medicine, patient verbalized understanding.

## 2024-08-03 DIAGNOSIS — E11.40 TYPE 2 DIABETES MELLITUS WITH DIABETIC NEUROPATHY, WITHOUT LONG-TERM CURRENT USE OF INSULIN (HCC): ICD-10-CM

## 2024-08-05 ENCOUNTER — OFFICE VISIT (OUTPATIENT)
Dept: FAMILY MEDICINE CLINIC | Age: 69
End: 2024-08-05
Payer: MEDICARE

## 2024-08-05 VITALS
HEART RATE: 92 BPM | OXYGEN SATURATION: 96 % | HEIGHT: 73 IN | BODY MASS INDEX: 40.56 KG/M2 | SYSTOLIC BLOOD PRESSURE: 134 MMHG | DIASTOLIC BLOOD PRESSURE: 68 MMHG | WEIGHT: 306 LBS | TEMPERATURE: 97.6 F

## 2024-08-05 DIAGNOSIS — J96.11 CHRONIC RESPIRATORY FAILURE WITH HYPOXIA (HCC): Primary | ICD-10-CM

## 2024-08-05 DIAGNOSIS — H61.23 BILATERAL IMPACTED CERUMEN: ICD-10-CM

## 2024-08-05 PROCEDURE — 3017F COLORECTAL CA SCREEN DOC REV: CPT | Performed by: FAMILY MEDICINE

## 2024-08-05 PROCEDURE — G8427 DOCREV CUR MEDS BY ELIG CLIN: HCPCS | Performed by: FAMILY MEDICINE

## 2024-08-05 PROCEDURE — 3075F SYST BP GE 130 - 139MM HG: CPT | Performed by: FAMILY MEDICINE

## 2024-08-05 PROCEDURE — 99213 OFFICE O/P EST LOW 20 MIN: CPT | Performed by: FAMILY MEDICINE

## 2024-08-05 PROCEDURE — G8417 CALC BMI ABV UP PARAM F/U: HCPCS | Performed by: FAMILY MEDICINE

## 2024-08-05 PROCEDURE — 1123F ACP DISCUSS/DSCN MKR DOCD: CPT | Performed by: FAMILY MEDICINE

## 2024-08-05 PROCEDURE — 1036F TOBACCO NON-USER: CPT | Performed by: FAMILY MEDICINE

## 2024-08-05 PROCEDURE — 3078F DIAST BP <80 MM HG: CPT | Performed by: FAMILY MEDICINE

## 2024-08-05 RX ORDER — GLIPIZIDE 10 MG/1
10 TABLET ORAL
Qty: 90 TABLET | Refills: 0 | OUTPATIENT
Start: 2024-08-05

## 2024-08-05 SDOH — ECONOMIC STABILITY: FOOD INSECURITY: WITHIN THE PAST 12 MONTHS, YOU WORRIED THAT YOUR FOOD WOULD RUN OUT BEFORE YOU GOT MONEY TO BUY MORE.: NEVER TRUE

## 2024-08-05 SDOH — ECONOMIC STABILITY: FOOD INSECURITY: WITHIN THE PAST 12 MONTHS, THE FOOD YOU BOUGHT JUST DIDN'T LAST AND YOU DIDN'T HAVE MONEY TO GET MORE.: NEVER TRUE

## 2024-08-05 SDOH — ECONOMIC STABILITY: INCOME INSECURITY: HOW HARD IS IT FOR YOU TO PAY FOR THE VERY BASICS LIKE FOOD, HOUSING, MEDICAL CARE, AND HEATING?: NOT HARD AT ALL

## 2024-08-05 ASSESSMENT — PATIENT HEALTH QUESTIONNAIRE - PHQ9

## 2024-08-05 ASSESSMENT — ENCOUNTER SYMPTOMS
RESPIRATORY NEGATIVE: 1
GASTROINTESTINAL NEGATIVE: 1
EYES NEGATIVE: 1
ALLERGIC/IMMUNOLOGIC NEGATIVE: 1

## 2024-08-05 NOTE — PROGRESS NOTES
SUBJECTIVE:    Patient ID: Sae Wilson is a 69 y.o.male.    HPI:   Patient here for evaluation of multiple medical problems  Patient is 69-year-old male.  He have history of chronic respiratory failure with hypoxia.  He sees pulmonary.  He works in the [employer].  He is unable to wear a bulletproof jacket secondary to the weight.  When he use the jacket he becomes hypoxic.  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  He also have bilateral earwax impaction.  He would like to have his ear flushed         Past Medical History:   Diagnosis Date    Anxiety     Benign essential HTN 7/10/2020    Depression     HTN (hypertension)     Hyperlipidemia 7/10/2020    Pneumonia     Type 2 diabetes mellitus (HCC) 5/2016      Current Outpatient Medications   Medication Sig Dispense Refill    lisinopril-hydroCHLOROthiazide (PRINZIDE;ZESTORETIC) 20-25 MG per tablet Take 1 tablet by mouth once daily 90 tablet 0    sertraline (ZOLOFT) 100 MG tablet Take 1 tablet by mouth once daily 90 tablet 0    pioglitazone (ACTOS) 30 MG tablet TAKE 1 TABLET BY MOUTH ONCE DAILY DISCONTINUE  FARXIGA 90 tablet 1    gabapentin (NEURONTIN) 600 MG tablet Take 1 tablet by mouth 2 times daily for 90 days. Max Daily Amount: 1,200 mg 60 tablet 2    rosuvastatin (CRESTOR) 10 MG tablet Take 1 tablet by mouth once daily 90 tablet 0    amLODIPine (NORVASC) 5 MG tablet Take 1 tablet by mouth nightly 90 tablet 1    predniSONE (DELTASONE) 10 MG tablet Take as directed      Semaglutide, 1 MG/DOSE, 4 MG/3ML SOPN Inject 1 mg into the skin once a week 9 mL 3    ibuprofen (ADVIL;MOTRIN) 800 MG tablet Take 1 tablet by mouth every 8 hours as needed for Pain 90 tablet 3    ipratropium-albuterol (DUONEB) 0.5-2.5 (3) MG/3ML SOLN nebulizer solution Inhale 3 mLs into the lungs every 4 hours as needed for Shortness of Breath or Other (wheezing) 360 mL 1     No current facility-administered medications for this visit.

## 2024-08-27 DIAGNOSIS — E78.5 HYPERLIPIDEMIA, UNSPECIFIED HYPERLIPIDEMIA TYPE: ICD-10-CM

## 2024-08-27 RX ORDER — ROSUVASTATIN CALCIUM 10 MG/1
TABLET, COATED ORAL
Qty: 90 TABLET | Refills: 1 | Status: SHIPPED | OUTPATIENT
Start: 2024-08-27

## 2024-09-13 ENCOUNTER — HOSPITAL ENCOUNTER (OUTPATIENT)
Dept: ULTRASOUND IMAGING | Age: 69
Discharge: HOME | End: 2024-09-13
Payer: MEDICARE

## 2024-09-13 DIAGNOSIS — E04.1 THYROID NODULE: ICD-10-CM

## 2024-09-13 PROCEDURE — C1729 CATH, DRAINAGE: HCPCS

## 2024-09-16 ENCOUNTER — TELEPHONE (OUTPATIENT)
Dept: ENT CLINIC | Age: 69
End: 2024-09-16

## 2024-10-20 DIAGNOSIS — F32.A ANXIETY AND DEPRESSION: ICD-10-CM

## 2024-10-20 DIAGNOSIS — I10 BENIGN ESSENTIAL HTN: ICD-10-CM

## 2024-10-20 DIAGNOSIS — F41.9 ANXIETY AND DEPRESSION: ICD-10-CM

## 2024-10-21 RX ORDER — SERTRALINE HYDROCHLORIDE 100 MG/1
100 TABLET, FILM COATED ORAL DAILY
Qty: 90 TABLET | Refills: 1 | Status: SHIPPED | OUTPATIENT
Start: 2024-10-21

## 2024-10-21 RX ORDER — LISINOPRIL AND HYDROCHLOROTHIAZIDE 20; 25 MG/1; MG/1
1 TABLET ORAL DAILY
Qty: 90 TABLET | Refills: 1 | Status: SHIPPED | OUTPATIENT
Start: 2024-10-21

## 2024-11-02 DIAGNOSIS — I10 BENIGN ESSENTIAL HTN: ICD-10-CM

## 2024-11-04 RX ORDER — AMLODIPINE BESYLATE 5 MG/1
5 TABLET ORAL NIGHTLY
Qty: 90 TABLET | Refills: 3 | Status: SHIPPED | OUTPATIENT
Start: 2024-11-04

## 2024-11-07 DIAGNOSIS — E11.40 TYPE 2 DIABETES MELLITUS WITH DIABETIC NEUROPATHY, WITHOUT LONG-TERM CURRENT USE OF INSULIN (HCC): ICD-10-CM

## 2024-11-07 DIAGNOSIS — I10 BENIGN ESSENTIAL HTN: ICD-10-CM

## 2024-11-07 LAB
ALBUMIN SERPL-MCNC: 4.3 G/DL (ref 3.5–5.2)
ALP SERPL-CCNC: 78 U/L (ref 40–129)
ALT SERPL-CCNC: 14 U/L (ref 5–41)
ANION GAP SERPL CALCULATED.3IONS-SCNC: 10 MMOL/L (ref 7–19)
AST SERPL-CCNC: 15 U/L (ref 5–40)
BACTERIA URNS QL MICRO: NEGATIVE /HPF
BILIRUB SERPL-MCNC: 0.3 MG/DL (ref 0.2–1.2)
BILIRUB UR QL STRIP: NEGATIVE
BUN SERPL-MCNC: 26 MG/DL (ref 8–23)
CALCIUM SERPL-MCNC: 9.4 MG/DL (ref 8.8–10.2)
CHLORIDE SERPL-SCNC: 96 MMOL/L (ref 98–111)
CHOLEST SERPL-MCNC: 100 MG/DL (ref 0–199)
CLARITY UR: CLEAR
CO2 SERPL-SCNC: 32 MMOL/L (ref 22–29)
COLOR UR: YELLOW
CREAT SERPL-MCNC: 1 MG/DL (ref 0.7–1.2)
CREAT UR-MCNC: 132.4 MG/DL (ref 39–259)
CRYSTALS URNS MICRO: NORMAL /HPF
EPI CELLS #/AREA URNS AUTO: 0 /HPF (ref 0–5)
ERYTHROCYTE [DISTWIDTH] IN BLOOD BY AUTOMATED COUNT: 14.4 % (ref 11.5–14.5)
GLUCOSE SERPL-MCNC: 122 MG/DL (ref 70–99)
GLUCOSE UR STRIP.AUTO-MCNC: NEGATIVE MG/DL
HBA1C MFR BLD: 7.4 % (ref 4–5.6)
HCT VFR BLD AUTO: 42.6 % (ref 42–52)
HDLC SERPL-MCNC: 45 MG/DL (ref 40–60)
HGB BLD-MCNC: 13.2 G/DL (ref 14–18)
HGB UR STRIP.AUTO-MCNC: NEGATIVE MG/L
HYALINE CASTS #/AREA URNS AUTO: 0 /HPF (ref 0–8)
KETONES UR STRIP.AUTO-MCNC: NEGATIVE MG/DL
LDLC SERPL CALC-MCNC: 40 MG/DL
LEUKOCYTE ESTERASE UR QL STRIP.AUTO: ABNORMAL
MCH RBC QN AUTO: 29.9 PG (ref 27–31)
MCHC RBC AUTO-ENTMCNC: 31 G/DL (ref 33–37)
MCV RBC AUTO: 96.4 FL (ref 80–94)
MICROALBUMIN UR-MCNC: <1.2 MG/DL (ref 0–1.99)
MICROALBUMIN/CREAT UR-RTO: NORMAL MG/G
NITRITE UR QL STRIP.AUTO: NEGATIVE
PH UR STRIP.AUTO: 6 [PH] (ref 5–8)
PLATELET # BLD AUTO: 202 K/UL (ref 130–400)
PMV BLD AUTO: 10.2 FL (ref 9.4–12.4)
POTASSIUM SERPL-SCNC: 4.2 MMOL/L (ref 3.5–5)
PROT SERPL-MCNC: 7.5 G/DL (ref 6.4–8.3)
PROT UR STRIP.AUTO-MCNC: NEGATIVE MG/DL
RBC # BLD AUTO: 4.42 M/UL (ref 4.7–6.1)
RBC #/AREA URNS AUTO: 1 /HPF (ref 0–4)
SODIUM SERPL-SCNC: 138 MMOL/L (ref 136–145)
SP GR UR STRIP.AUTO: 1.02 (ref 1–1.03)
TRIGL SERPL-MCNC: 76 MG/DL (ref 0–149)
TSH SERPL DL<=0.005 MIU/L-ACNC: 3.33 UIU/ML (ref 0.27–4.2)
UROBILINOGEN UR STRIP.AUTO-MCNC: 1 E.U./DL
WBC # BLD AUTO: 8.6 K/UL (ref 4.8–10.8)
WBC #/AREA URNS AUTO: 1 /HPF (ref 0–5)

## 2024-11-07 SDOH — HEALTH STABILITY: PHYSICAL HEALTH: ON AVERAGE, HOW MANY DAYS PER WEEK DO YOU ENGAGE IN MODERATE TO STRENUOUS EXERCISE (LIKE A BRISK WALK)?: 0 DAYS

## 2024-11-07 SDOH — HEALTH STABILITY: PHYSICAL HEALTH: ON AVERAGE, HOW MANY MINUTES DO YOU ENGAGE IN EXERCISE AT THIS LEVEL?: 0 MIN

## 2024-11-07 ASSESSMENT — LIFESTYLE VARIABLES
HOW OFTEN DO YOU HAVE A DRINK CONTAINING ALCOHOL: NEVER
HOW OFTEN DO YOU HAVE A DRINK CONTAINING ALCOHOL: 1
HOW MANY STANDARD DRINKS CONTAINING ALCOHOL DO YOU HAVE ON A TYPICAL DAY: PATIENT DOES NOT DRINK
HOW OFTEN DO YOU HAVE SIX OR MORE DRINKS ON ONE OCCASION: 1
HOW MANY STANDARD DRINKS CONTAINING ALCOHOL DO YOU HAVE ON A TYPICAL DAY: 0

## 2024-11-07 ASSESSMENT — PATIENT HEALTH QUESTIONNAIRE - PHQ9
4. FEELING TIRED OR HAVING LITTLE ENERGY: NOT AT ALL
8. MOVING OR SPEAKING SO SLOWLY THAT OTHER PEOPLE COULD HAVE NOTICED. OR THE OPPOSITE, BEING SO FIGETY OR RESTLESS THAT YOU HAVE BEEN MOVING AROUND A LOT MORE THAN USUAL: NOT AT ALL
SUM OF ALL RESPONSES TO PHQ QUESTIONS 1-9: 0
SUM OF ALL RESPONSES TO PHQ9 QUESTIONS 1 & 2: 0
7. TROUBLE CONCENTRATING ON THINGS, SUCH AS READING THE NEWSPAPER OR WATCHING TELEVISION: NOT AT ALL
5. POOR APPETITE OR OVEREATING: NOT AT ALL
SUM OF ALL RESPONSES TO PHQ QUESTIONS 1-9: 0
6. FEELING BAD ABOUT YOURSELF - OR THAT YOU ARE A FAILURE OR HAVE LET YOURSELF OR YOUR FAMILY DOWN: NOT AT ALL
SUM OF ALL RESPONSES TO PHQ QUESTIONS 1-9: 0
2. FEELING DOWN, DEPRESSED OR HOPELESS: NOT AT ALL
9. THOUGHTS THAT YOU WOULD BE BETTER OFF DEAD, OR OF HURTING YOURSELF: NOT AT ALL
3. TROUBLE FALLING OR STAYING ASLEEP: NOT AT ALL
1. LITTLE INTEREST OR PLEASURE IN DOING THINGS: NOT AT ALL
10. IF YOU CHECKED OFF ANY PROBLEMS, HOW DIFFICULT HAVE THESE PROBLEMS MADE IT FOR YOU TO DO YOUR WORK, TAKE CARE OF THINGS AT HOME, OR GET ALONG WITH OTHER PEOPLE: NOT DIFFICULT AT ALL
SUM OF ALL RESPONSES TO PHQ QUESTIONS 1-9: 0

## 2024-11-12 ENCOUNTER — OFFICE VISIT (OUTPATIENT)
Dept: FAMILY MEDICINE CLINIC | Age: 69
End: 2024-11-12

## 2024-11-12 VITALS
SYSTOLIC BLOOD PRESSURE: 134 MMHG | WEIGHT: 304 LBS | TEMPERATURE: 96.9 F | DIASTOLIC BLOOD PRESSURE: 62 MMHG | BODY MASS INDEX: 40.29 KG/M2 | OXYGEN SATURATION: 91 % | HEIGHT: 73 IN | HEART RATE: 85 BPM

## 2024-11-12 DIAGNOSIS — Z00.00 MEDICARE ANNUAL WELLNESS VISIT, SUBSEQUENT: Primary | ICD-10-CM

## 2024-11-12 DIAGNOSIS — Z23 NEED FOR INFLUENZA VACCINATION: ICD-10-CM

## 2024-11-12 DIAGNOSIS — F51.01 PRIMARY INSOMNIA: ICD-10-CM

## 2024-11-12 DIAGNOSIS — E11.40 TYPE 2 DIABETES MELLITUS WITH DIABETIC NEUROPATHY, WITHOUT LONG-TERM CURRENT USE OF INSULIN (HCC): ICD-10-CM

## 2024-11-12 SDOH — ECONOMIC STABILITY: FOOD INSECURITY: WITHIN THE PAST 12 MONTHS, THE FOOD YOU BOUGHT JUST DIDN'T LAST AND YOU DIDN'T HAVE MONEY TO GET MORE.: NEVER TRUE

## 2024-11-12 SDOH — ECONOMIC STABILITY: INCOME INSECURITY: HOW HARD IS IT FOR YOU TO PAY FOR THE VERY BASICS LIKE FOOD, HOUSING, MEDICAL CARE, AND HEATING?: NOT HARD AT ALL

## 2024-11-12 SDOH — ECONOMIC STABILITY: FOOD INSECURITY: WITHIN THE PAST 12 MONTHS, YOU WORRIED THAT YOUR FOOD WOULD RUN OUT BEFORE YOU GOT MONEY TO BUY MORE.: NEVER TRUE

## 2024-11-12 NOTE — PROGRESS NOTES
After obtaining consent, and per orders of Dr. Figueroa, injection of Flu VAccine given in Right deltoid by Keke Gaston MA. Patient instructed to remain in clinic for 20 minutes afterwards, and to report any adverse reaction to me immediately.

## 2024-11-12 NOTE — PROGRESS NOTES
Medicare Annual Wellness Visit    Sae Wilson is here for Medicare AWV and Check-Up    Assessment & Plan   Medicare annual wellness visit, subsequent  Type 2 diabetes mellitus with diabetic neuropathy, without long-term current use of insulin (HCC)- stable   -     semaglutide, 2 MG/DOSE, (OZEMPIC) 8 MG/3ML SOPN sc injection; Inject 2 mg into the skin every 7 days, Disp-3 mL, R-5Normal  -     Lipid Panel; Future  -     Hemoglobin A1C; Future  -     Comprehensive Metabolic Panel; Future  -     CBC; Future  -     Microalbumin / Creatinine Urine Ratio; Future  -     Urinalysis; Future  Need for influenza vaccination  -     Influenza, FLUAD Trivalent, (age 65 y+), IM, Preservative Free, 0.5mL  Primary insomnia- not control   -     Suvorexant 15 MG TABS; Take 15 mg by mouth nightly for 90 days. Max Daily Amount: 15 mg, Disp-30 tablet, R-2Normal    Recommendations for Preventive Services Due: see orders and patient instructions/AVS.  Recommended screening schedule for the next 5-10 years is provided to the patient in written form: see Patient Instructions/AVS.     Return in 3 months (on 2/12/2025).     Subjective   The following acute and/or chronic problems were also addressed today:  Patient is a 69-year-old male.  He takes Ozempic for diabetes.  A1c have improved but still elevated.  He is able to tolerate the medication.  He have some weight loss before but the weight loss is slowed down.  He also have problem going and staying asleep.  He try area on the counter medication before without success.  He also used Ambien in the past without success.    Patient's complete Health Risk Assessment and screening values have been reviewed and are found in Flowsheets. The following problems were reviewed today and where indicated follow up appointments were made and/or referrals ordered.    Positive Risk Factor Screenings with Interventions:              Inactivity:  On average, how many days per week do you engage in moderate

## 2024-11-26 ENCOUNTER — PATIENT MESSAGE (OUTPATIENT)
Dept: FAMILY MEDICINE CLINIC | Age: 69
End: 2024-11-26

## 2024-11-26 DIAGNOSIS — F51.01 PRIMARY INSOMNIA: Primary | ICD-10-CM

## 2024-11-27 RX ORDER — ESZOPICLONE 3 MG/1
3 TABLET, FILM COATED ORAL NIGHTLY
Qty: 30 TABLET | Refills: 0 | Status: SHIPPED | OUTPATIENT
Start: 2024-11-27 | End: 2024-12-27

## 2025-01-06 DIAGNOSIS — E11.40 TYPE 2 DIABETES MELLITUS WITH DIABETIC NEUROPATHY, WITHOUT LONG-TERM CURRENT USE OF INSULIN (HCC): ICD-10-CM

## 2025-01-06 DIAGNOSIS — G25.81 RESTLESS LEG SYNDROME: ICD-10-CM

## 2025-01-06 RX ORDER — GABAPENTIN 600 MG/1
TABLET ORAL
Qty: 60 TABLET | Refills: 0 | Status: SHIPPED | OUTPATIENT
Start: 2025-01-06 | End: 2025-02-05

## 2025-01-06 RX ORDER — PIOGLITAZONE 30 MG/1
TABLET ORAL
Qty: 90 TABLET | Refills: 0 | Status: SHIPPED | OUTPATIENT
Start: 2025-01-06

## 2025-01-12 DIAGNOSIS — E11.40 TYPE 2 DIABETES MELLITUS WITH DIABETIC NEUROPATHY, WITHOUT LONG-TERM CURRENT USE OF INSULIN (HCC): ICD-10-CM

## 2025-01-12 DIAGNOSIS — G25.81 RESTLESS LEG SYNDROME: ICD-10-CM

## 2025-01-13 RX ORDER — GABAPENTIN 600 MG/1
TABLET ORAL
Qty: 60 TABLET | Refills: 2 | Status: SHIPPED | OUTPATIENT
Start: 2025-01-13 | End: 2025-02-12

## 2025-01-13 RX ORDER — PIOGLITAZONE 30 MG/1
TABLET ORAL
Qty: 90 TABLET | Refills: 1 | Status: SHIPPED | OUTPATIENT
Start: 2025-01-13

## 2025-02-14 ENCOUNTER — OFFICE VISIT (OUTPATIENT)
Dept: PULMONOLOGY | Facility: CLINIC | Age: 70
End: 2025-02-14
Payer: MEDICARE

## 2025-02-14 VITALS
OXYGEN SATURATION: 93 % | BODY MASS INDEX: 41.04 KG/M2 | HEIGHT: 72 IN | HEART RATE: 84 BPM | WEIGHT: 303 LBS | SYSTOLIC BLOOD PRESSURE: 134 MMHG | DIASTOLIC BLOOD PRESSURE: 80 MMHG

## 2025-02-14 DIAGNOSIS — J98.4 RESTRICTIVE LUNG DISEASE: ICD-10-CM

## 2025-02-14 DIAGNOSIS — J98.4 RESTRICTIVE LUNG DISEASE: Primary | ICD-10-CM

## 2025-02-14 DIAGNOSIS — Z86.16 HISTORY OF COVID-19: ICD-10-CM

## 2025-02-14 DIAGNOSIS — J96.11 CHRONIC RESPIRATORY FAILURE WITH HYPOXIA: Chronic | ICD-10-CM

## 2025-02-14 DIAGNOSIS — J98.6 ELEVATED DIAPHRAGM: Chronic | ICD-10-CM

## 2025-02-14 DIAGNOSIS — Z99.81 DEPENDENCE ON SUPPLEMENTAL OXYGEN: Chronic | ICD-10-CM

## 2025-02-14 PROBLEM — J96.01 ACUTE RESPIRATORY FAILURE WITH HYPOXEMIA: Status: RESOLVED | Noted: 2024-03-11 | Resolved: 2025-02-14

## 2025-02-14 PROBLEM — J18.9 ATYPICAL PNEUMONIA: Status: RESOLVED | Noted: 2024-03-12 | Resolved: 2025-02-14

## 2025-02-14 PROBLEM — J18.9 PNEUMONIA, UNSPECIFIED ORGANISM: Status: RESOLVED | Noted: 2024-03-14 | Resolved: 2025-02-14

## 2025-02-14 NOTE — PROCEDURES
Spirometry with Diffusion Capacity & Lung Volumes    Performed by: Cruz Quiroz CMA  Authorized by: Mayra Weeks APRN     Pre Drug % Predicted    FVC: 49%   FEV1: 53%   FEF 25-75%: 78%   FEV1/FVC: 82%   T%   RV: 67%   DLCO: 63%   D/VAsb: 110%

## 2025-02-14 NOTE — PROGRESS NOTES
" JUSTYNA Welch  [facility]   Pulmonary and Critical Care  [address] [place] [address]  Phone: [phone]  Fax: [phone]           Chief Complaint  Restrictive lung disease and Chronic respiratory failure with hypoxia    Subjective        Kolby Michel presents to [facility] PULMONARY & CRITICAL CARE MEDICINE     History of Present Illness  Mr. Michel is a 70-year-old male who has restrictive lung disease (need lung volumes to confirm), negative high-res CT, elevated right diaphragm, chronic respiratory failure, history of COVID 19.     His PFT today with lung volumes confirms the restriction.  He is able to maintain typically at work now in a more sedentary position.  He does have access to supplemental oxygen at work if needed.  He keeps a watch on his oxygen level with a pulse oximeter.  He is able to sit and rest and take deep breaths to recover fairly quickly when needed.  He has times of intermittent breaks that he can get up and ambulate around the office.  He has been seen by his primary care physician who has recommended increasing his Ozempic dose to help facilitate weight loss.  Despite his current efforts he has plateaued and has had no further weight loss.        Objective   Vital Signs:   /80   Pulse 84   Ht 182.9 cm (72\")   Wt (!) 137 kg (303 lb)   SpO2 93% Comment: RA  BMI 41.09 kg/m²     Physical Exam  Vitals reviewed.   Constitutional:       Appearance: Normal appearance. He is morbidly obese.   Cardiovascular:      Rate and Rhythm: Normal rate and regular rhythm.   Pulmonary:      Effort: Pulmonary effort is normal.      Breath sounds: Normal breath sounds.   Neurological:      General: No focal deficit present.      Mental Status: He is alert and oriented to person, place, and time.   Psychiatric:         Mood and Affect: Mood normal.         Behavior: Behavior normal.            Result Review :  The following data was " reviewed by: JUSTYNA Welch on 2025:    My interpretation of imaging: No new  My interpretation of labs: No new    PFT Values          2024    14:30 2025    10:30   Pre Drug PFT Results   FVC 47 49   FEV1 48 53   FEF 25-75% 51 78   FEV1/FVC 77 82   Other Tests PFT Results   TLC  55   RV  67   DLCO 68 63   D/VAsb 117 110     My interpretation of the PFT : As below    Results for orders placed in visit on 25    Spirometry with Diffusion Capacity & Lung Volumes    Narrative  Spirometry with Diffusion Capacity & Lung Volumes    Performed by: Cruz Quiroz CMA  Authorized by: Mayra Weeks APRN  Pre Drug % Predicted  FVC: 49%  FEV1: 53%  FEF 25-75%: 78%  FEV1/FVC: 82%  T%  RV: 67%  DLCO: 63%  D/VAsb: 110%      Results for orders placed in visit on 24    Spirometry with Diffusion Capacity    Narrative  Spirometry with Diffusion Capacity    Performed by: Winter Watson RRT  Authorized by: Mayra Weeks APRN  Pre Drug % Predicted  FVC: 47%  FEV1: 48%  FEF 25-75%: 51%  FEV1/FVC: 77%  DLCO: 68%  D/VAsb: 117%    Interpretation  Spirometry  Spirometry shows severe restriction. There is reduced midflow suggesting small airway/airflow obstruction.  Diffusion Capacity  The patient's diffusion capacity is mildly reduced.  Diffusion capacity is normal when corrected for alveolar volume.  Overall comments: No prior to compare        Assessment and Plan   Diagnoses and all orders for this visit:    1. Restrictive lung disease (Primary)    2. Chronic respiratory failure with hypoxia    3. Dependence on supplemental oxygen    4. Elevated diaphragm  Overview:  Right      5. History of COVID-19      Discussed his pulmonary function study.  Lung volumes do confirm restrictive lung disease.  He is stable at this point but continues to work on weight loss.  We discussed again that the restriction is likely related to that significantly elevated right diaphragm as well  as his weight.  We also talked again about his prior overnight showing an PHOEBE of 56 and recommendations for sleep study.  We discussed the possibility that with continued weight loss he may not need to have a sleep study.  He would like to work towards that prior to doing a sleep study.  I also recommended that we could repeat an overnight in the future should he lose any more weight.  He was agreeable to that.  We also discussed that if he continues to have a plateau in his weight loss that [facility] has a weight loss management program that his primary care physician could potentially refer him to.  I have otherwise asked him to return in 1 year with a flow-volume loop and diffusion capacity.         Alpha 1: Normal, MM  LDCT: does not qualify  Smoking Cessation: never smoker   Vaccinations: Flu: Completed PNA: completed        Follow Up   No follow-ups on file.  Patient was given instructions and counseling regarding his condition or for health maintenance advice. Please see specific information pulled into the AVS if appropriate.     JUSTYNA Welch  2/14/2025  10:46 CST    Please note that portions of this note were completed with a voice recognition program.    Patient or patient representative verbalized consent for the use of Ambient Listening during the visit with  JUSTYNA Welch for chart documentation. 2/14/2025  11:48 CST

## 2025-02-17 DIAGNOSIS — E04.1 THYROID NODULE: Primary | ICD-10-CM

## 2025-02-20 ENCOUNTER — TELEPHONE (OUTPATIENT)
Age: 70
End: 2025-02-20

## 2025-02-20 DIAGNOSIS — J96.21 ACUTE ON CHRONIC RESPIRATORY FAILURE WITH HYPOXEMIA (HCC): Primary | ICD-10-CM

## 2025-02-20 RX ORDER — IPRATROPIUM BROMIDE AND ALBUTEROL SULFATE 2.5; .5 MG/3ML; MG/3ML
1 SOLUTION RESPIRATORY (INHALATION) EVERY 6 HOURS
Qty: 30 EACH | Refills: 1 | Status: SHIPPED | OUTPATIENT
Start: 2025-02-20

## 2025-02-22 DIAGNOSIS — E78.5 HYPERLIPIDEMIA, UNSPECIFIED HYPERLIPIDEMIA TYPE: ICD-10-CM

## 2025-02-24 RX ORDER — ROSUVASTATIN CALCIUM 10 MG/1
TABLET, COATED ORAL
Qty: 90 TABLET | Refills: 0 | Status: SHIPPED | OUTPATIENT
Start: 2025-02-24

## 2025-03-03 DIAGNOSIS — E11.40 TYPE 2 DIABETES MELLITUS WITH DIABETIC NEUROPATHY, WITHOUT LONG-TERM CURRENT USE OF INSULIN (HCC): ICD-10-CM

## 2025-04-22 DIAGNOSIS — F41.9 ANXIETY AND DEPRESSION: ICD-10-CM

## 2025-04-22 DIAGNOSIS — I10 BENIGN ESSENTIAL HTN: ICD-10-CM

## 2025-04-22 DIAGNOSIS — F32.A ANXIETY AND DEPRESSION: ICD-10-CM

## 2025-04-22 RX ORDER — SERTRALINE HYDROCHLORIDE 100 MG/1
100 TABLET, FILM COATED ORAL DAILY
Qty: 90 TABLET | Refills: 1 | Status: SHIPPED | OUTPATIENT
Start: 2025-04-22

## 2025-04-22 RX ORDER — LISINOPRIL AND HYDROCHLOROTHIAZIDE 20; 25 MG/1; MG/1
1 TABLET ORAL DAILY
Qty: 90 TABLET | Refills: 1 | Status: SHIPPED | OUTPATIENT
Start: 2025-04-22

## 2025-05-14 DIAGNOSIS — E78.5 HYPERLIPIDEMIA, UNSPECIFIED HYPERLIPIDEMIA TYPE: ICD-10-CM

## 2025-05-15 RX ORDER — ROSUVASTATIN CALCIUM 10 MG/1
10 TABLET, COATED ORAL DAILY
Qty: 90 TABLET | Refills: 1 | Status: SHIPPED | OUTPATIENT
Start: 2025-05-15

## 2025-06-17 ENCOUNTER — PATIENT MESSAGE (OUTPATIENT)
Dept: CARE COORDINATION | Age: 70
End: 2025-06-17

## 2025-07-13 SDOH — ECONOMIC STABILITY: FOOD INSECURITY: WITHIN THE PAST 12 MONTHS, THE FOOD YOU BOUGHT JUST DIDN'T LAST AND YOU DIDN'T HAVE MONEY TO GET MORE.: NEVER TRUE

## 2025-07-13 SDOH — ECONOMIC STABILITY: INCOME INSECURITY: IN THE LAST 12 MONTHS, WAS THERE A TIME WHEN YOU WERE NOT ABLE TO PAY THE MORTGAGE OR RENT ON TIME?: NO

## 2025-07-13 SDOH — ECONOMIC STABILITY: TRANSPORTATION INSECURITY
IN THE PAST 12 MONTHS, HAS THE LACK OF TRANSPORTATION KEPT YOU FROM MEDICAL APPOINTMENTS OR FROM GETTING MEDICATIONS?: NO

## 2025-07-13 SDOH — ECONOMIC STABILITY: FOOD INSECURITY: WITHIN THE PAST 12 MONTHS, YOU WORRIED THAT YOUR FOOD WOULD RUN OUT BEFORE YOU GOT MONEY TO BUY MORE.: NEVER TRUE

## 2025-07-13 SDOH — ECONOMIC STABILITY: TRANSPORTATION INSECURITY
IN THE PAST 12 MONTHS, HAS LACK OF TRANSPORTATION KEPT YOU FROM MEETINGS, WORK, OR FROM GETTING THINGS NEEDED FOR DAILY LIVING?: NO

## 2025-07-13 ASSESSMENT — PATIENT HEALTH QUESTIONNAIRE - PHQ9
2. FEELING DOWN, DEPRESSED OR HOPELESS: NOT AT ALL
SUM OF ALL RESPONSES TO PHQ QUESTIONS 1-9: 2
SUM OF ALL RESPONSES TO PHQ9 QUESTIONS 1 & 2: 2
SUM OF ALL RESPONSES TO PHQ QUESTIONS 1-9: 2
2. FEELING DOWN, DEPRESSED OR HOPELESS: NOT AT ALL
SUM OF ALL RESPONSES TO PHQ QUESTIONS 1-9: 2
1. LITTLE INTEREST OR PLEASURE IN DOING THINGS: MORE THAN HALF THE DAYS
1. LITTLE INTEREST OR PLEASURE IN DOING THINGS: MORE THAN HALF THE DAYS
SUM OF ALL RESPONSES TO PHQ QUESTIONS 1-9: 2

## 2025-07-14 DIAGNOSIS — E11.40 TYPE 2 DIABETES MELLITUS WITH DIABETIC NEUROPATHY, WITHOUT LONG-TERM CURRENT USE OF INSULIN (HCC): ICD-10-CM

## 2025-07-14 LAB
ALBUMIN SERPL-MCNC: 4.2 G/DL (ref 3.5–5.2)
ALP SERPL-CCNC: 67 U/L (ref 40–129)
ALT SERPL-CCNC: 17 U/L (ref 10–50)
ANION GAP SERPL CALCULATED.3IONS-SCNC: 9 MMOL/L (ref 8–16)
AST SERPL-CCNC: 19 U/L (ref 10–50)
BILIRUB SERPL-MCNC: 0.4 MG/DL (ref 0.2–1.2)
BILIRUB UR QL STRIP: NEGATIVE
BUN SERPL-MCNC: 21 MG/DL (ref 8–23)
CALCIUM SERPL-MCNC: 9.8 MG/DL (ref 8.8–10.2)
CHLORIDE SERPL-SCNC: 101 MMOL/L (ref 98–107)
CHOLEST SERPL-MCNC: 113 MG/DL (ref 0–199)
CLARITY UR: CLEAR
CO2 SERPL-SCNC: 31 MMOL/L (ref 22–29)
COLOR UR: YELLOW
CREAT SERPL-MCNC: 1 MG/DL (ref 0.7–1.2)
CREAT UR-MCNC: 83.5 MG/DL (ref 39–259)
ERYTHROCYTE [DISTWIDTH] IN BLOOD BY AUTOMATED COUNT: 15 % (ref 11.5–14.5)
GLUCOSE SERPL-MCNC: 142 MG/DL (ref 70–99)
GLUCOSE UR STRIP.AUTO-MCNC: NEGATIVE MG/DL
HBA1C MFR BLD: 7.8 % (ref 4–5.6)
HCT VFR BLD AUTO: 41.7 % (ref 42–52)
HDLC SERPL-MCNC: 43 MG/DL (ref 40–60)
HGB BLD-MCNC: 12.7 G/DL (ref 14–18)
HGB UR STRIP.AUTO-MCNC: NEGATIVE MG/L
KETONES UR STRIP.AUTO-MCNC: NEGATIVE MG/DL
LDLC SERPL CALC-MCNC: 51 MG/DL
LEUKOCYTE ESTERASE UR QL STRIP.AUTO: NEGATIVE
MCH RBC QN AUTO: 29.1 PG (ref 27–31)
MCHC RBC AUTO-ENTMCNC: 30.5 G/DL (ref 33–37)
MCV RBC AUTO: 95.6 FL (ref 80–94)
MICROALBUMIN UR-MCNC: <1.2 MG/DL (ref 0–1.99)
MICROALBUMIN/CREAT UR-RTO: NORMAL MG/G (ref 0–29)
NITRITE UR QL STRIP.AUTO: NEGATIVE
PH UR STRIP.AUTO: 7.5 [PH] (ref 5–8)
PLATELET # BLD AUTO: 209 K/UL (ref 130–400)
PMV BLD AUTO: 10.1 FL (ref 9.4–12.4)
POTASSIUM SERPL-SCNC: 4.6 MMOL/L (ref 3.5–5.1)
PROT SERPL-MCNC: 7.4 G/DL (ref 6.4–8.3)
PROT UR STRIP.AUTO-MCNC: NEGATIVE MG/DL
RBC # BLD AUTO: 4.36 M/UL (ref 4.7–6.1)
SODIUM SERPL-SCNC: 141 MMOL/L (ref 136–145)
SP GR UR STRIP.AUTO: 1.01 (ref 1–1.03)
TRIGL SERPL-MCNC: 95 MG/DL (ref 0–149)
UROBILINOGEN UR STRIP.AUTO-MCNC: 1 E.U./DL
WBC # BLD AUTO: 7.8 K/UL (ref 4.8–10.8)

## 2025-07-16 ENCOUNTER — OFFICE VISIT (OUTPATIENT)
Age: 70
End: 2025-07-16

## 2025-07-16 VITALS
WEIGHT: 306 LBS | SYSTOLIC BLOOD PRESSURE: 134 MMHG | BODY MASS INDEX: 40.56 KG/M2 | DIASTOLIC BLOOD PRESSURE: 80 MMHG | TEMPERATURE: 97.1 F | HEIGHT: 73 IN | OXYGEN SATURATION: 95 % | HEART RATE: 90 BPM

## 2025-07-16 DIAGNOSIS — E78.5 HYPERLIPIDEMIA, UNSPECIFIED HYPERLIPIDEMIA TYPE: ICD-10-CM

## 2025-07-16 DIAGNOSIS — E66.813 OBESITY, CLASS 3 (HCC): ICD-10-CM

## 2025-07-16 DIAGNOSIS — I10 BENIGN ESSENTIAL HTN: ICD-10-CM

## 2025-07-16 DIAGNOSIS — J96.11 CHRONIC RESPIRATORY FAILURE WITH HYPOXIA (HCC): ICD-10-CM

## 2025-07-16 DIAGNOSIS — E11.40 TYPE 2 DIABETES MELLITUS WITH DIABETIC NEUROPATHY, WITHOUT LONG-TERM CURRENT USE OF INSULIN (HCC): Primary | ICD-10-CM

## 2025-07-16 RX ORDER — TIRZEPATIDE 5 MG/.5ML
5 INJECTION, SOLUTION SUBCUTANEOUS WEEKLY
Qty: 2 ML | Refills: 5 | Status: SHIPPED | OUTPATIENT
Start: 2025-07-16

## 2025-07-16 RX ORDER — TIRZEPATIDE 2.5 MG/.5ML
2.5 INJECTION, SOLUTION SUBCUTANEOUS WEEKLY
Qty: 2 ML | Refills: 0 | Status: SHIPPED | OUTPATIENT
Start: 2025-07-16

## 2025-07-16 NOTE — PROGRESS NOTES
facility-administered medications for this visit.      Allergies   Allergen Reactions    Latex     Aspirin     Penicillins        Review of Systems   Constitutional: Negative.    HENT: Negative.     Eyes: Negative.    Respiratory: Negative.     Cardiovascular: Negative.    Gastrointestinal: Negative.    Endocrine: Negative.    Genitourinary: Negative.    Musculoskeletal: Negative.    Skin: Negative.    Allergic/Immunologic: Negative.    Neurological: Negative.    Hematological: Negative.    Psychiatric/Behavioral: Negative.         OBJECTIVE:    Physical Exam  Vitals reviewed.   Constitutional:       Appearance: Normal appearance. He is well-developed. He is obese.   HENT:      Head: Normocephalic and atraumatic.      Right Ear: Tympanic membrane, ear canal and external ear normal. There is no impacted cerumen.      Left Ear: Tympanic membrane, ear canal and external ear normal. There is no impacted cerumen.      Nose: Nose normal.      Mouth/Throat:      Lips: Pink.      Mouth: Mucous membranes are moist.      Dentition: Normal dentition.      Tongue: No lesions.      Pharynx: Oropharynx is clear. Uvula midline.      Tonsils: No tonsillar exudate or tonsillar abscesses.   Eyes:      General: Lids are normal.         Right eye: No discharge.         Left eye: No discharge.      Extraocular Movements:      Right eye: Normal extraocular motion.      Left eye: Normal extraocular motion.      Conjunctiva/sclera: Conjunctivae normal.      Right eye: Right conjunctiva is not injected.      Left eye: Left conjunctiva is not injected.      Pupils: Pupils are equal, round, and reactive to light.   Neck:      Thyroid: No thyromegaly.      Vascular: No carotid bruit or JVD.   Cardiovascular:      Rate and Rhythm: Normal rate and regular rhythm.      Pulses:           Carotid pulses are 2+ on the right side and 2+ on the left side.       Radial pulses are 2+ on the right side and 2+ on the left side.      Heart sounds: Normal

## 2025-07-30 DIAGNOSIS — E11.40 TYPE 2 DIABETES MELLITUS WITH DIABETIC NEUROPATHY, WITHOUT LONG-TERM CURRENT USE OF INSULIN (HCC): ICD-10-CM

## 2025-07-30 DIAGNOSIS — G25.81 RESTLESS LEG SYNDROME: ICD-10-CM

## 2025-07-30 RX ORDER — GABAPENTIN 600 MG/1
600 TABLET ORAL 2 TIMES DAILY
Qty: 60 TABLET | Refills: 2 | Status: SHIPPED | OUTPATIENT
Start: 2025-07-30 | End: 2025-10-28

## (undated) DEVICE — ENDO KIT,LOURDES HOSPITAL: Brand: MEDLINE INDUSTRIES, INC.